# Patient Record
Sex: MALE | Race: WHITE | Employment: OTHER | URBAN - METROPOLITAN AREA
[De-identification: names, ages, dates, MRNs, and addresses within clinical notes are randomized per-mention and may not be internally consistent; named-entity substitution may affect disease eponyms.]

---

## 2016-03-28 LAB
COLONOSCOPY, EXTERNAL: NORMAL
COLONOSCOPY, EXTERNAL: NORMAL

## 2017-01-05 ENCOUNTER — TELEPHONE (OUTPATIENT)
Dept: FAMILY MEDICINE CLINIC | Age: 72
End: 2017-01-05

## 2017-01-05 NOTE — TELEPHONE ENCOUNTER
TP #4 NC/B  Spoke w/ pt as part of [address] w/ pts wife, Taty Alvarez (on HIPPA). Pt is compliant with his medications however is no longer checking his bs. Pt has memory issues and is not following a dm diet (per wife,he sneaks food). Pt seen in the ED 12/24 for lethargy and was dx with flu--pt is better but still recovering. Will cont to check in with pt and provide support for the family. If not at goal next ov will add metformin.

## 2017-03-02 DIAGNOSIS — J44.9 CHRONIC OBSTRUCTIVE PULMONARY DISEASE, UNSPECIFIED COPD TYPE (HCC): Chronic | ICD-10-CM

## 2017-03-02 RX ORDER — BUDESONIDE AND FORMOTEROL FUMARATE DIHYDRATE 160; 4.5 UG/1; UG/1
2 AEROSOL RESPIRATORY (INHALATION) 2 TIMES DAILY
Qty: 1 INHALER | Refills: 5 | Status: SHIPPED | OUTPATIENT
Start: 2017-03-02 | End: 2017-07-05 | Stop reason: SDUPTHER

## 2017-03-06 ENCOUNTER — OFFICE VISIT (OUTPATIENT)
Dept: FAMILY MEDICINE CLINIC | Age: 72
End: 2017-03-06

## 2017-03-06 VITALS
BODY MASS INDEX: 30.75 KG/M2 | DIASTOLIC BLOOD PRESSURE: 85 MMHG | HEART RATE: 84 BPM | RESPIRATION RATE: 20 BRPM | OXYGEN SATURATION: 96 % | WEIGHT: 232 LBS | SYSTOLIC BLOOD PRESSURE: 113 MMHG | HEIGHT: 73 IN

## 2017-03-06 DIAGNOSIS — I10 ESSENTIAL HYPERTENSION WITH GOAL BLOOD PRESSURE LESS THAN 140/90: Chronic | ICD-10-CM

## 2017-03-06 DIAGNOSIS — D50.0 IRON DEFICIENCY ANEMIA DUE TO CHRONIC BLOOD LOSS: Chronic | ICD-10-CM

## 2017-03-06 DIAGNOSIS — E11.65 TYPE 2 DIABETES MELLITUS WITH HYPERGLYCEMIA, WITHOUT LONG-TERM CURRENT USE OF INSULIN (HCC): Primary | Chronic | ICD-10-CM

## 2017-03-06 DIAGNOSIS — M19.90 OSTEOARTHRITIS, UNSPECIFIED OSTEOARTHRITIS TYPE, UNSPECIFIED SITE: ICD-10-CM

## 2017-03-06 RX ORDER — GLIPIZIDE 5 MG/1
TABLET ORAL
Qty: 60 TAB | Refills: 4 | Status: SHIPPED | OUTPATIENT
Start: 2017-03-06 | End: 2017-07-05 | Stop reason: SDUPTHER

## 2017-03-06 RX ORDER — LOSARTAN POTASSIUM AND HYDROCHLOROTHIAZIDE 25; 100 MG/1; MG/1
1 TABLET ORAL DAILY
Qty: 30 TAB | Refills: 5 | Status: SHIPPED | OUTPATIENT
Start: 2017-03-06 | End: 2017-05-11 | Stop reason: SDUPTHER

## 2017-03-06 NOTE — PROGRESS NOTES
Patient here for dm f/u, fasting labs. 1. Have you been to the ER, urgent care clinic since your last visit? Hospitalized since your last visit? No    2. Have you seen or consulted any other health care providers outside of the [address] [place] [address] since your last visit? Include any pap smears or colon screening. Francesca Yoo  3/6/2017  Provider:   Krysten:  Diabetes Report Card   1) Have you seen the eye doctor in past year?no    2) How would you  rate your Diabetic Diet?good   3) How well do you take care of your feet?well   4) Do you keep your Primary Care Follow Up Appts? yes    5) Do you know your A1C goal?yes    6) Do you take your medications daily? yes    7) Do you check your blood sugars?no    8) Have you gained weight?no      9) Do you follow an exercise program?yes    10) Can you do better?yes      Lab Results   Component Value Date/Time    Cholesterol, total 207 12/05/2016 08:03 AM    HDL Cholesterol 35 12/05/2016 08:03 AM    LDL, calculated 143 12/05/2016 08:03 AM    Triglyceride 145 12/05/2016 08:03 AM     Lab Results   Component Value Date/Time    Hemoglobin A1c 7.7 12/05/2016 08:03 AM    Hemoglobin A1c 6.7 08/31/2016 07:54 AM    Hemoglobin A1c 6.2 04/06/2016 11:59 AM    Glucose 256 12/24/2016 11:40 AM    Glucose (POC) 202 03/30/2016 07:31 AM    Microalb/Creat ratio (ug/mg creat.) 12.2 08/31/2016 07:54 AM    LDL, calculated 143 12/05/2016 08:03 AM    Creatinine (POC) 0.9 08/02/2013 11:38 AM    Creatinine 1.14 12/24/2016 11:40 AM        Lab Results   Component Value Date/Time    Microalb/Creat ratio (ug/mg creat.) 12.2 08/31/2016 07:54 AM      Chief Complaint   Patient presents with    Diabetes     he is a 70y.o. year old male who presents for evaluation. See Diabetic Report Card listed above.      Patient Active Problem List    Diagnosis    Osteoarthritis    Advance care planning    Dementia without behavioral disturbance    PUD (peptic ulcer disease)    Iron deficiency anemia    Candidal esophagitis (HCC)    COPD (chronic obstructive pulmonary disease) (HCC)    HTN (hypertension)    Type II diabetes mellitus ([facility] [address]    Blood loss anemia    GI bleed       Nurses notes were copied in to this note. Reviewed PmHx, RxHx, FmHx, SocHx, AllgHx--dated and updated in the chart. Review of Systems - negative except as listed above in the HPI    Objective:     Vitals:    03/06/17 0719   BP: 113/85   Pulse: 84   Resp: 20   SpO2: 96%   Weight: 232 lb (105.2 kg)   Height: 6' 1\" (1.854 m)     Physical Examination: General appearance - alert, well appearing, and in no distress  Chest - clear to auscultation, no wheezes, rales or rhonchi, symmetric air entry  Heart - normal rate, regular rhythm, normal S1, S2, no murmurs, rubs, clicks or gallops  Abdomen - soft, nontender, nondistended, no masses or organomegaly  Extremities - peripheral pulses normal, no pedal edema, no clubbing or cyanosis    Assessment/ Plan:   Trudi Enamorado was seen today for diabetes. Diagnoses and all orders for this visit:    Type 2 diabetes mellitus with hyperglycemia, without long-term current use of insulin ([facility])  -     LIPID PANEL  -     METABOLIC PANEL, COMPREHENSIVE  -     HEMOGLOBIN A1C WITH EAG  -I discussed with the patient the new \"Diabetes Full [place]\" outreach program.  Patient has agreed to participate and understands expectations and goals  Our brochure, along with the listed current labs and standards of care, was given to the patient. The patient also met with Armani Sue (SHANKAR), who will be contacting the patient outside of their appointments. -pt has made some diet changes  -inc A1C last Ov--will add rx if still inc---add metformin    Essential hypertension with goal blood pressure less than 140/90  -     losartan-hydroCHLOROthiazide (HYZAAR) 100-25 mg per tablet; Take 1 Tab by mouth daily.  Indications: hypertension  -     LIPID PANEL  -     METABOLIC PANEL, COMPREHENSIVE  -at goal    Iron deficiency anemia due to chronic blood loss  -     CBC WITH AUTOMATED DIFF    Other orders  -     glipiZIDE (GLUCOTROL) 5 mg tablet; TAKE ONE TABLET BY MOUTH TWICE A DAY       Follow-up Disposition:  Return in about 3 months (around 6/6/2017) for DM. Lab Results   Component Value Date/Time    Cholesterol, total 207 12/05/2016 08:03 AM    HDL Cholesterol 35 12/05/2016 08:03 AM    LDL, calculated 143 12/05/2016 08:03 AM    Triglyceride 145 12/05/2016 08:03 AM     Lab Results   Component Value Date/Time    Hemoglobin A1c 7.7 12/05/2016 08:03 AM    Hemoglobin A1c 6.7 08/31/2016 07:54 AM    Hemoglobin A1c 6.2 04/06/2016 11:59 AM    Microalb/Creat ratio (ug/mg creat.) 12.2 08/31/2016 07:54 AM    LDL, calculated 143 12/05/2016 08:03 AM    Creatinine (POC) 0.9 08/02/2013 11:38 AM    Creatinine 1.14 12/24/2016 11:40 AM          Discussed with patient goal of Diabetes to include:  HgA1C <7, LDL cholesterol <70, Blood pressure <130/80. Discussed with patient diet and weight management and to get regular exercise. Recommend yearly eye exams and daily foot care. The patient understands and agrees with the plan. I have discussed the diagnosis with the patient and the intended plan as seen in the above orders. The patient has received an after-visit summary and questions were answered concerning future plans. Medication Side Effects and Warnings were discussed with patient  Patient Labs were reviewed and or requested  Patient Past Records were reviewed and or requested    Susan De La Torre M.D. [facility]    There are no Patient Instructions on file for this visit.

## 2017-03-06 NOTE — MR AVS SNAPSHOT
Visit Information Date & Time Provider Department Dept. Phone Encounter #  
 3/6/2017  7:15 AM Erika Beverly MD [address] [facility] [phone] [id number] Follow-up Instructions Return in about 3 months (around 6/6/2017) for DM. Upcoming Health Maintenance Date Due  
 EYE EXAM RETINAL OR DILATED Q1 11/6/1955 DTaP/Tdap/Td series (1 - Tdap) 11/6/1966 ZOSTER VACCINE AGE 60> 11/6/2005 GLAUCOMA SCREENING Q2Y 11/6/2010 Pneumococcal 65+ Low/Medium Risk (1 of 2 - PCV13) 11/6/2010 FOBT Q 1 YEAR AGE 50-75 3/27/2017 HEMOGLOBIN A1C Q6M 6/5/2017 FOOT EXAM Q1 8/31/2017 MICROALBUMIN Q1 8/31/2017 LIPID PANEL Q1 12/5/2017 MEDICARE YEARLY EXAM 12/6/2017 Allergies as of 3/6/2017  Review Complete On: 3/6/2017 By: Erika Beverly MD  
  
 Severity Noted Reaction Type Reactions Pcn [Penicillins] Medium 12/12/2012   Systemic Rash  
 Sulfa (Sulfonamide Antibiotics) Medium 12/12/2012   Systemic Rash Demerol [Meperidine]  12/12/2012   Intolerance Nausea and Vomiting Severe vomiting Current Immunizations  Reviewed on 12/5/2016 Name Date Influenza High Dose Vaccine PF 12/5/2016 Not reviewed this visit You Were Diagnosed With   
  
 Codes Comments Type 2 diabetes mellitus with hyperglycemia, without long-term current use of insulin (HCC)    -  Primary ICD-10-CM: E11.65 ICD-9-CM: 250.00, 790.29 Essential hypertension with goal blood pressure less than 140/90     ICD-10-CM: I10 
ICD-9-CM: 401.9 Iron deficiency anemia due to chronic blood loss     ICD-10-CM: D50.0 ICD-9-CM: 280.0 Vitals BP Pulse Resp Height(growth percentile) Weight(growth percentile) SpO2  
 113/85 84 20 6' 1\" (1.854 m) 232 lb (105.2 kg) 96% BMI Smoking Status 30.61 kg/m2 Former Smoker Vitals History BMI and BSA Data Body Mass Index Body Surface Area  
 30.61 kg/m 2 2.33 m 2 Preferred Pharmacy Pharmacy Name Phone [facility] [facility], [address] [phone] Your Updated Medication List  
  
   
This list is accurate as of: 3/6/17  7:49 AM.  Always use your most recent med list.  
  
  
  
  
 albuterol 90 mcg/actuation inhaler Commonly known as:  PROAIR HFA Take 1 Puff by inhalation every four (4) hours as needed. budesonide-formoterol 160-4.5 mcg/actuation HFA inhaler Commonly known as:  SYMBICORT Take 2 Puffs by inhalation two (2) times a day. ferrous sulfate 325 mg (65 mg iron) tablet TAKE ONE TABLET BY MOUTH THREE TIMES A DAY WITH MEALS  
  
 glipiZIDE 5 mg tablet Commonly known as:  GLUCOTROL  
TAKE ONE TABLET BY MOUTH TWICE A DAY  
  
 guaiFENesin  mg ER tablet Commonly known as:  [address] Take 1 Tab by mouth two (2) times a day. losartan-hydroCHLOROthiazide 100-25 mg per tablet Commonly known as:  HYZAAR Take 1 Tab by mouth daily. Indications: hypertension OTHER Take 1 Tab by mouth daily. Previgen supplement for memory  
  
 pantoprazole 40 mg tablet Commonly known as:  PROTONIX  
TAKE ONE TABLET BY MOUTH TWICE A DAY  
  
 simvastatin 10 mg tablet Commonly known as:  ZOCOR Take 10 mg by mouth nightly. Prescriptions Sent to Pharmacy Refills  
 losartan-hydroCHLOROthiazide (HYZAAR) 100-25 mg per tablet 5 Sig: Take 1 Tab by mouth daily. Indications: hypertension Class: Normal  
 Pharmacy: [facility], [address] Ph #: [phone] Route: Oral  
 glipiZIDE (GLUCOTROL) 5 mg tablet 4 Sig: TAKE ONE TABLET BY MOUTH TWICE A DAY Class: Normal  
 Pharmacy: [facility], [address] Ph #: [phone] We Performed the Following CBC WITH AUTOMATED DIFF [08367 CPT(R)] HEMOGLOBIN A1C WITH EAG [80899 CPT(R)] LIPID PANEL [62459 CPT(R)] METABOLIC PANEL, COMPREHENSIVE [08112 CPT(R)] Follow-up Instructions Return in about 3 months (around 6/6/2017) for DM. Introducing [facility]! Jennifer Ramos introduces [employer] patient portal. Now you can access parts of your medical record, email your doctor's office, and request medication refills online. 1. In your internet browser, go to [email or web address] 2. Click on the First Time User? Click Here link in the Sign In box. You will see the New Member Sign Up page. 3. Enter your [employer] Access Code exactly as it appears below. You will not need to use this code after youve completed the sign-up process. If you do not sign up before the expiration date, you must request a new code. · [employer] Access Code: [email or web address] Expires: 6/4/2017  7:49 AM 
 
4. Enter the last four digits of your Social Security Number (xxxx) and Date of Birth (mm/dd/yyyy) as indicated and click Submit. You will be taken to the next sign-up page. 5. Create a D[employer]uda ID. This will be your [employer] login ID and cannot be changed, so think of one that is secure and easy to remember. 6. Create a [employer]uda password. You can change your password at any time. 7. Enter your Password Reset Question and Answer. This can be used at a later time if you forget your password. 8. Enter your e-mail address. You will receive e-mail notification when new information is available in [address] 9. Click Sign Up. You can now view and download portions of your medical record. 10. Click the Download Summary menu link to download a portable copy of your medical information. If you have questions, please visit the Frequently Asked Questions section of the [employer] website. Remember, [employer] is NOT to be used for urgent needs. For medical emergencies, dial 911. Now available from your iPhone and Android! Please provide this summary of care documentation to your next provider. Your primary care clinician is listed as MERCEDEZ TEJADA. If you have any questions after today's visit, please call [phone].

## 2017-03-07 LAB
ALBUMIN SERPL-MCNC: 4.4 G/DL (ref 3.5–4.8)
ALBUMIN/GLOB SERPL: 1.7 {RATIO} (ref 1.1–2.5)
ALP SERPL-CCNC: 61 IU/L (ref 39–117)
ALT SERPL-CCNC: 10 IU/L (ref 0–44)
AST SERPL-CCNC: 12 IU/L (ref 0–40)
BASOPHILS # BLD AUTO: 0 X10E3/UL (ref 0–0.2)
BASOPHILS NFR BLD AUTO: 0 %
BILIRUB SERPL-MCNC: 1.3 MG/DL (ref 0–1.2)
BUN SERPL-MCNC: 15 MG/DL (ref 8–27)
BUN/CREAT SERPL: 12 (ref 10–22)
CALCIUM SERPL-MCNC: 9.2 MG/DL (ref 8.6–10.2)
CHLORIDE SERPL-SCNC: 99 MMOL/L (ref 96–106)
CHOLEST SERPL-MCNC: 189 MG/DL (ref 100–199)
CO2 SERPL-SCNC: 25 MMOL/L (ref 18–29)
CREAT SERPL-MCNC: 1.25 MG/DL (ref 0.76–1.27)
EOSINOPHIL # BLD AUTO: 0 X10E3/UL (ref 0–0.4)
EOSINOPHIL NFR BLD AUTO: 1 %
ERYTHROCYTE [DISTWIDTH] IN BLOOD BY AUTOMATED COUNT: 13.6 % (ref 12.3–15.4)
EST. AVERAGE GLUCOSE BLD GHB EST-MCNC: 140 MG/DL
GLOBULIN SER CALC-MCNC: 2.6 G/DL (ref 1.5–4.5)
GLUCOSE SERPL-MCNC: 105 MG/DL (ref 65–99)
HBA1C MFR BLD: 6.5 % (ref 4.8–5.6)
HCT VFR BLD AUTO: 41 % (ref 37.5–51)
HDLC SERPL-MCNC: 43 MG/DL
HGB BLD-MCNC: 14.3 G/DL (ref 12.6–17.7)
IMM GRANULOCYTES # BLD: 0 X10E3/UL (ref 0–0.1)
IMM GRANULOCYTES NFR BLD: 0 %
INTERPRETATION, 910389: NORMAL
INTERPRETATION: NORMAL
LDLC SERPL CALC-MCNC: 128 MG/DL (ref 0–99)
LYMPHOCYTES # BLD AUTO: 0.9 X10E3/UL (ref 0.7–3.1)
LYMPHOCYTES NFR BLD AUTO: 17 %
Lab: NORMAL
MCH RBC QN AUTO: 31.8 PG (ref 26.6–33)
MCHC RBC AUTO-ENTMCNC: 34.9 G/DL (ref 31.5–35.7)
MCV RBC AUTO: 91 FL (ref 79–97)
MONOCYTES # BLD AUTO: 0.6 X10E3/UL (ref 0.1–0.9)
MONOCYTES NFR BLD AUTO: 10 %
NEUTROPHILS # BLD AUTO: 4.1 X10E3/UL (ref 1.4–7)
NEUTROPHILS NFR BLD AUTO: 72 %
PDF IMAGE, 910387: NORMAL
PLATELET # BLD AUTO: 238 X10E3/UL (ref 150–379)
POTASSIUM SERPL-SCNC: 3.7 MMOL/L (ref 3.5–5.2)
PROT SERPL-MCNC: 7 G/DL (ref 6–8.5)
RBC # BLD AUTO: 4.49 X10E6/UL (ref 4.14–5.8)
SODIUM SERPL-SCNC: 141 MMOL/L (ref 134–144)
TRIGL SERPL-MCNC: 91 MG/DL (ref 0–149)
VLDLC SERPL CALC-MCNC: 18 MG/DL (ref 5–40)
WBC # BLD AUTO: 5.6 X10E3/UL (ref 3.4–10.8)

## 2017-05-11 DIAGNOSIS — I10 ESSENTIAL HYPERTENSION WITH GOAL BLOOD PRESSURE LESS THAN 140/90: Chronic | ICD-10-CM

## 2017-05-15 RX ORDER — LOSARTAN POTASSIUM AND HYDROCHLOROTHIAZIDE 25; 100 MG/1; MG/1
1 TABLET ORAL DAILY
Qty: 30 TAB | Refills: 5 | Status: SHIPPED | OUTPATIENT
Start: 2017-05-15 | End: 2017-06-15 | Stop reason: SDUPTHER

## 2017-05-24 ENCOUNTER — TELEPHONE (OUTPATIENT)
Dept: FAMILY MEDICINE CLINIC | Age: 72
End: 2017-05-24

## 2017-05-24 NOTE — TELEPHONE ENCOUNTER
Left voicemail. Called to discuss dm management.  Pt enrolled in [facility] Outreach program.

## 2017-05-25 ENCOUNTER — DOCUMENTATION ONLY (OUTPATIENT)
Dept: FAMILY MEDICINE CLINIC | Age: 72
End: 2017-05-25

## 2017-06-15 DIAGNOSIS — I10 ESSENTIAL HYPERTENSION WITH GOAL BLOOD PRESSURE LESS THAN 140/90: Chronic | ICD-10-CM

## 2017-06-15 RX ORDER — LOSARTAN POTASSIUM AND HYDROCHLOROTHIAZIDE 25; 100 MG/1; MG/1
1 TABLET ORAL DAILY
Qty: 90 TAB | Refills: 1 | Status: SHIPPED | OUTPATIENT
Start: 2017-06-15 | End: 2018-02-26 | Stop reason: SDUPTHER

## 2017-06-29 ENCOUNTER — TELEPHONE (OUTPATIENT)
Dept: FAMILY MEDICINE CLINIC | Age: 72
End: 2017-06-29

## 2017-06-29 NOTE — TELEPHONE ENCOUNTER
TP #5 C-A Spoke to pts wife, Eri Knight (on HIPPA). Reminded to have pt set up for appointment--pt is overdue for labs. Wife feels that pt is malnourished b/c he is not eating much of anything. Will need to assess pts eating habits at next ov and obtain labs. Pt is compliant with medications. Hx of dementia.

## 2017-07-05 ENCOUNTER — HOSPITAL ENCOUNTER (OUTPATIENT)
Dept: LAB | Age: 72
Discharge: HOME | End: 2017-07-05
Payer: MEDICARE

## 2017-07-05 ENCOUNTER — OFFICE VISIT (OUTPATIENT)
Dept: FAMILY MEDICINE CLINIC | Age: 72
End: 2017-07-05

## 2017-07-05 VITALS
RESPIRATION RATE: 20 BRPM | HEART RATE: 74 BPM | HEIGHT: 73 IN | DIASTOLIC BLOOD PRESSURE: 75 MMHG | OXYGEN SATURATION: 94 % | TEMPERATURE: 98 F | BODY MASS INDEX: 30.75 KG/M2 | WEIGHT: 232 LBS | SYSTOLIC BLOOD PRESSURE: 104 MMHG

## 2017-07-05 DIAGNOSIS — J44.9 CHRONIC OBSTRUCTIVE PULMONARY DISEASE, UNSPECIFIED COPD TYPE (HCC): Chronic | ICD-10-CM

## 2017-07-05 DIAGNOSIS — E11.65 TYPE 2 DIABETES MELLITUS WITH HYPERGLYCEMIA, WITHOUT LONG-TERM CURRENT USE OF INSULIN (HCC): Primary | Chronic | ICD-10-CM

## 2017-07-05 DIAGNOSIS — I10 ESSENTIAL HYPERTENSION: Chronic | ICD-10-CM

## 2017-07-05 DIAGNOSIS — B37.81 CANDIDAL ESOPHAGITIS (HCC): ICD-10-CM

## 2017-07-05 PROCEDURE — 80061 LIPID PANEL: CPT

## 2017-07-05 PROCEDURE — 80053 COMPREHEN METABOLIC PANEL: CPT

## 2017-07-05 PROCEDURE — 83036 HEMOGLOBIN GLYCOSYLATED A1C: CPT

## 2017-07-05 RX ORDER — BUDESONIDE AND FORMOTEROL FUMARATE DIHYDRATE 160; 4.5 UG/1; UG/1
2 AEROSOL RESPIRATORY (INHALATION) 2 TIMES DAILY
Qty: 1 INHALER | Refills: 5 | Status: SHIPPED | OUTPATIENT
Start: 2017-07-05 | End: 2018-03-01 | Stop reason: SDUPTHER

## 2017-07-05 RX ORDER — GLIPIZIDE 5 MG/1
TABLET ORAL
Qty: 180 TAB | Refills: 3 | Status: SHIPPED | OUTPATIENT
Start: 2017-07-05 | End: 2018-06-19 | Stop reason: SDUPTHER

## 2017-07-05 NOTE — PROGRESS NOTES
Patient here for 3 month f/u DM, fasting labs. 1. Have you been to the ER, urgent care clinic since your last visit? Hospitalized since your last visit? No    2. Have you seen or consulted any other health care providers outside of the [address] [place] [address] since your last visit? Include any pap smears or colon screening. No       Darron Arias  7/5/2017  Provider:   Krysten:  Diabetes Report Card   1) Have you seen the eye doctor in past year?yes    2) How would you  rate your Diabetic Diet?good   3) How well do you take care of your feet?well   4) Do you keep your Primary Care Follow Up Appts? yes    5) Do you know your A1C goal?yes    6) Do you take your medications daily? yes    7) Do you check your blood sugars? yes    8) Have you gained weight?no       9) Do you follow an exercise program?yes    10) Can you do better?yes      Lab Results   Component Value Date/Time    Cholesterol, total 189 03/06/2017 07:49 AM    HDL Cholesterol 43 03/06/2017 07:49 AM    LDL, calculated 128 03/06/2017 07:49 AM    Triglyceride 91 03/06/2017 07:49 AM     Lab Results   Component Value Date/Time    Hemoglobin A1c 6.5 03/06/2017 07:49 AM    Hemoglobin A1c 7.7 12/05/2016 08:03 AM    Hemoglobin A1c 6.7 08/31/2016 07:54 AM    Glucose 105 03/06/2017 07:49 AM    Glucose (POC) 202 03/30/2016 07:31 AM    Microalb/Creat ratio (ug/mg creat.) 12.2 08/31/2016 07:54 AM    LDL, calculated 128 03/06/2017 07:49 AM    Creatinine (POC) 0.9 08/02/2013 11:38 AM    Creatinine 1.25 03/06/2017 07:49 AM        Lab Results   Component Value Date/Time    Microalb/Creat ratio (ug/mg creat.) 12.2 08/31/2016 07:54 AM      Chief Complaint   Patient presents with    Diabetes     fasting labs     he is a 70y.o. year old male who presents for evaluation. See Diabetic Report Card listed above.      Patient Active Problem List    Diagnosis    Type 2 diabetes mellitus with hyperglycemia, without long-term current use of insulin ([facility] [address]    Osteoarthritis    Advance care planning    Dementia without behavioral disturbance    PUD (peptic ulcer disease)    Iron deficiency anemia    Candidal esophagitis (HCC)    COPD (chronic obstructive pulmonary disease) (HCC)    HTN (hypertension)    Blood loss anemia    GI bleed       Nurses notes were copied in to this note. Reviewed PmHx, RxHx, FmHx, SocHx, AllgHx--dated and updated in the chart. Review of Systems - negative except as listed above in the HPI    Objective:     Vitals:    07/05/17 0830   BP: 104/75   Pulse: 74   Resp: 20   Temp: 98 °F (36.7 °C)   SpO2: 94%   Weight: 232 lb (105.2 kg)   Height: 6' 1\" (1.854 m)     Physical Examination: General appearance - alert, well appearing, and in no distress  Neck - supple, no significant adenopathy  Chest - clear to auscultation, no wheezes, rales or rhonchi, symmetric air entry  Heart - normal rate, regular rhythm, normal S1, S2, no murmurs, rubs, clicks or gallops    Assessment/ Plan:   Holly Jiménez was seen today for diabetes. Diagnoses and all orders for this visit:    Type 2 diabetes mellitus with hyperglycemia, without long-term current use of insulin ([facility])  -     glipiZIDE (GLUCOTROL) 5 mg tablet; TAKE ONE TABLET BY MOUTH TWICE A DAY  -     LIPID PANEL  -     METABOLIC PANEL, COMPREHENSIVE  -     HEMOGLOBIN A1C WITH EAG  -     REFERRAL TO OPHTHALMOLOGY  -at goal  -I discussed with the patient the new \"Diabetes Full [place]\" outreach program.  Patient has agreed to participate and understands expectations and goals  Our brochure, along with the listed current labs and standards of care, was given to the patient. Essential hypertension  -     LIPID PANEL  -     METABOLIC PANEL, COMPREHENSIVE  -at goal    Chronic obstructive pulmonary disease, unspecified COPD type (HCC)  -     budesonide-formoterol (SYMBICORT) 160-4.5 mcg/actuation HFA inhaler; Take 2 Puffs by inhalation two (2) times a day.     Candidal esophagitis ([address]  -resolved Follow-up Disposition:  Return in about 3 months (around 10/5/2017) for DM. Lab Results   Component Value Date/Time    Cholesterol, total 189 03/06/2017 07:49 AM    HDL Cholesterol 43 03/06/2017 07:49 AM    LDL, calculated 128 03/06/2017 07:49 AM    Triglyceride 91 03/06/2017 07:49 AM     Lab Results   Component Value Date/Time    Hemoglobin A1c 6.5 03/06/2017 07:49 AM    Hemoglobin A1c 7.7 12/05/2016 08:03 AM    Hemoglobin A1c 6.7 08/31/2016 07:54 AM    Microalb/Creat ratio (ug/mg creat.) 12.2 08/31/2016 07:54 AM    LDL, calculated 128 03/06/2017 07:49 AM    Creatinine (POC) 0.9 08/02/2013 11:38 AM    Creatinine 1.25 03/06/2017 07:49 AM          Discussed with patient goal of Diabetes to include:  HgA1C <7, LDL cholesterol <70, Blood pressure <130/80. Discussed with patient diet and weight management and to get regular exercise. Recommend yearly eye exams and daily foot care. The patient understands and agrees with the plan. I have discussed the diagnosis with the patient and the intended plan as seen in the above orders. The patient has received an after-visit summary and questions were answered concerning future plans. Medication Side Effects and Warnings were discussed with patient  Patient Labs were reviewed and or requested  Patient Past Records were reviewed and or requested    Shaun Wray M.D. [address] [facility]    There are no Patient Instructions on file for this visit.

## 2017-07-05 NOTE — LETTER
7/6/2017 9:32 AM 
 
Mr. Tory Becerra [address] [address] Dear Tory Becerra: 
 
Please find your most recent results below. Resulted Orders LIPID PANEL Result Value Ref Range Cholesterol, total 197 100 - 199 mg/dL Triglyceride 114 0 - 149 mg/dL HDL Cholesterol 36 (L) >39 mg/dL VLDL, calculated 23 5 - 40 mg/dL LDL, calculated 138 (H) 0 - 99 mg/dL Narrative Performed at:  [address]  [id number] : Inocente Light MD, Phone:  [phone] METABOLIC PANEL, COMPREHENSIVE Result Value Ref Range Glucose 124 (H) 65 - 99 mg/dL BUN 12 8 - 27 mg/dL Creatinine 1.13 0.76 - 1.27 mg/dL GFR est non-AA 65 >59 mL/min/1.73 GFR est AA 75 >59 mL/min/1.73  
 BUN/Creatinine ratio 11 10 - 24 Sodium 140 134 - 144 mmol/L Potassium 3.3 (L) 3.5 - 5.2 mmol/L Chloride 99 96 - 106 mmol/L  
 CO2 25 18 - 29 mmol/L Calcium 9.0 8.6 - 10.2 mg/dL Protein, total 6.7 6.0 - 8.5 g/dL Albumin 4.1 3.5 - 4.8 g/dL GLOBULIN, TOTAL 2.6 1.5 - 4.5 g/dL A-G Ratio 1.6 1.2 - 2.2 Bilirubin, total 1.2 0.0 - 1.2 mg/dL Alk. phosphatase 64 39 - 117 IU/L  
 AST (SGOT) 15 0 - 40 IU/L  
 ALT (SGPT) 10 0 - 44 IU/L Narrative Performed at:  [address]  [id number] : Inocente Light MD, Phone:  [phone] HEMOGLOBIN A1C WITH EAG Result Value Ref Range Hemoglobin A1c 6.8 (H) 4.8 - 5.6 % Comment:  
            Pre-diabetes: 5.7 - 6.4 Diabetes: >6.4 Glycemic control for adults with diabetes: <7.0 Estimated average glucose 148 mg/dL Narrative Performed at:  [address]  [id number] : Inocente Light MD, Phone:  [phone] CVD REPORT Result Value Ref Range INTERPRETATION Note Comment:  
   Supplement report is available. Narrative Performed at:  [facility] [place] [address]  [id number] : Autumn Romero PhD, Phone:  [phone] DIABETES PATIENT EDUCATION Result Value Ref Range PDF Image Not applicable Narrative Performed at:  [address] [place] [address]  [id number] : Autumn Romero PhD, Phone:  [phone] Please call me if you have any questions: [phone] Sincerely, TOMI PintoC

## 2017-07-05 NOTE — MR AVS SNAPSHOT
Visit Information Date & Time Provider Department Dept. Phone Encounter #  
 7/5/2017  8:30 AM Steven Saucedo MD [address] [facility] [phone] [id number] Follow-up Instructions Return in about 3 months (around 10/5/2017) for DM. Upcoming Health Maintenance Date Due  
 EYE EXAM RETINAL OR DILATED Q1 11/6/1955 DTaP/Tdap/Td series (1 - Tdap) 11/6/1966 ZOSTER VACCINE AGE 60> 11/6/2005 GLAUCOMA SCREENING Q2Y 11/6/2010 Pneumococcal 65+ Low/Medium Risk (1 of 2 - PCV13) 11/6/2010 FOBT Q 1 YEAR AGE 50-75 3/27/2017 INFLUENZA AGE 9 TO ADULT 8/1/2017 FOOT EXAM Q1 8/31/2017 MICROALBUMIN Q1 8/31/2017 HEMOGLOBIN A1C Q6M 9/6/2017 MEDICARE YEARLY EXAM 12/6/2017 LIPID PANEL Q1 3/6/2018 Allergies as of 7/5/2017  Review Complete On: 7/5/2017 By: Steven Saucedo MD  
  
 Severity Noted Reaction Type Reactions Pcn [Penicillins] Medium 12/12/2012   Systemic Rash  
 Sulfa (Sulfonamide Antibiotics) Medium 12/12/2012   Systemic Rash Demerol [Meperidine]  12/12/2012   Intolerance Nausea and Vomiting Severe vomiting Current Immunizations  Reviewed on 12/5/2016 Name Date Influenza High Dose Vaccine PF 12/5/2016 Not reviewed this visit You Were Diagnosed With   
  
 Codes Comments Type 2 diabetes mellitus with hyperglycemia, without long-term current use of insulin (HCC)    -  Primary ICD-10-CM: E11.65 ICD-9-CM: 250.00, 790.29 Essential hypertension     ICD-10-CM: I10 
ICD-9-CM: 401.9 Chronic obstructive pulmonary disease, unspecified COPD type ([facility]     ICD-10-CM: J44.9 ICD-9-CM: 788 Candidal esophagitis (HCC)     ICD-10-CM: B37.81 ICD-9-CM: 112.84 Vitals BP Pulse Temp Resp Height(growth percentile) Weight(growth percentile) 104/75 74 98 °F (36.7 °C) 20 6' 1\" (1.854 m) 232 lb (105.2 kg) SpO2 BMI Smoking Status 94% 30.61 kg/m2 Former Smoker Vitals History BMI and BSA Data Body Mass Index Body Surface Area  
 30.61 kg/m 2 2.33 m 2 Preferred Pharmacy Pharmacy Name Phone Annemarie Ray [facility], [address] [phone] Your Updated Medication List  
  
   
This list is accurate as of: 7/5/17  9:06 AM.  Always use your most recent med list.  
  
  
  
  
 albuterol 90 mcg/actuation inhaler Commonly known as:  PROAIR HFA Take 1 Puff by inhalation every four (4) hours as needed. budesonide-formoterol 160-4.5 mcg/actuation HFA inhaler Commonly known as:  SYMBICORT Take 2 Puffs by inhalation two (2) times a day. ferrous sulfate 325 mg (65 mg iron) tablet TAKE ONE TABLET BY MOUTH THREE TIMES A DAY WITH MEALS  
  
 glipiZIDE 5 mg tablet Commonly known as:  GLUCOTROL  
TAKE ONE TABLET BY MOUTH TWICE A DAY  
  
 losartan-hydroCHLOROthiazide 100-25 mg per tablet Commonly known as:  HYZAAR Take 1 Tab by mouth daily. Indications: hypertension  
  
 pantoprazole 40 mg tablet Commonly known as:  PROTONIX  
TAKE ONE TABLET BY MOUTH TWICE A DAY  
  
 simvastatin 10 mg tablet Commonly known as:  ZOCOR Take 10 mg by mouth nightly. Prescriptions Sent to Pharmacy Refills  
 budesonide-formoterol (SYMBICORT) 160-4.5 mcg/actuation HFA inhaler 5 Sig: Take 2 Puffs by inhalation two (2) times a day. Class: Normal  
 Pharmacy: Annemarie Larsen, [email or web address] Ph #: [phone] Route: Inhalation  
 glipiZIDE (GLUCOTROL) 5 mg tablet 3 Sig: TAKE ONE TABLET BY MOUTH TWICE A DAY Class: Normal  
 Pharmacy: Annemarie Larsen, [address] [address] Ph #: [phone] We Performed the Following HEMOGLOBIN A1C WITH EAG [70120 CPT(R)] LIPID PANEL [21545 CPT(R)] METABOLIC PANEL, COMPREHENSIVE [38049 CPT(R)] REFERRAL TO OPHTHALMOLOGY [REF57 Custom] Follow-up Instructions Return in about 3 months (around 10/5/2017) for DM. Referral Information Referral ID Referred By Referred To  
  
 [id number] MERCEDEZ TEJADA Not Available Visits Status Start Date End Date 1 New Request 7/5/17 7/5/18 If your referral has a status of pending review or denied, additional information will be sent to support the outcome of this decision. Introducing [facility] & [facility] SERVICES! Eron Torres introduces HomeRun patient portal. Now you can access parts of your medical record, email your doctor's office, and request medication refills online. 1. In your internet browser, go to [email or web address] 2. Click on the First Time User? Click Here link in the Sign In box. You will see the New Member Sign Up page. 3. Enter your HomeRun Access Code exactly as it appears below. You will not need to use this code after youve completed the sign-up process. If you do not sign up before the expiration date, you must request a new code. · HomeRun Access Code: [email or web address] Expires: 10/3/2017  8:17 AM 
 
4. Enter the last four digits of your Social Security Number (xxxx) and Date of Birth (mm/dd/yyyy) as indicated and click Submit. You will be taken to the next sign-up page. 5. Create a HomeRun ID. This will be your HomeRun login ID and cannot be changed, so think of one that is secure and easy to remember. 6. Create a HomeRun password. You can change your password at any time. 7. Enter your Password Reset Question and Answer. This can be used at a later time if you forget your password. 8. Enter your e-mail address. You will receive e-mail notification when new information is available in [address] 9. Click Sign Up. You can now view and download portions of your medical record. 10. Click the Download Summary menu link to download a portable copy of your medical information. If you have questions, please visit the Frequently Asked Questions section of the HomeRun website.  Remember, HomeRun is NOT to be used for urgent needs. For medical emergencies, dial 911. Now available from your iPhone and Android! Please provide this summary of care documentation to your next provider. Your primary care clinician is listed as MERCEDEZ TEJADA. If you have any questions after today's visit, please call [phone].

## 2017-07-06 LAB
ALBUMIN SERPL-MCNC: 4.1 G/DL (ref 3.5–4.8)
ALBUMIN/GLOB SERPL: 1.6 {RATIO} (ref 1.2–2.2)
ALP SERPL-CCNC: 64 IU/L (ref 39–117)
ALT SERPL-CCNC: 10 IU/L (ref 0–44)
AST SERPL-CCNC: 15 IU/L (ref 0–40)
BILIRUB SERPL-MCNC: 1.2 MG/DL (ref 0–1.2)
BUN SERPL-MCNC: 12 MG/DL (ref 8–27)
BUN/CREAT SERPL: 11 (ref 10–24)
CALCIUM SERPL-MCNC: 9 MG/DL (ref 8.6–10.2)
CHLORIDE SERPL-SCNC: 99 MMOL/L (ref 96–106)
CHOLEST SERPL-MCNC: 197 MG/DL (ref 100–199)
CO2 SERPL-SCNC: 25 MMOL/L (ref 18–29)
CREAT SERPL-MCNC: 1.13 MG/DL (ref 0.76–1.27)
EST. AVERAGE GLUCOSE BLD GHB EST-MCNC: 148 MG/DL
GLOBULIN SER CALC-MCNC: 2.6 G/DL (ref 1.5–4.5)
GLUCOSE SERPL-MCNC: 124 MG/DL (ref 65–99)
HBA1C MFR BLD: 6.8 % (ref 4.8–5.6)
HDLC SERPL-MCNC: 36 MG/DL
INTERPRETATION, 910389: NORMAL
LDLC SERPL CALC-MCNC: 138 MG/DL (ref 0–99)
Lab: NORMAL
POTASSIUM SERPL-SCNC: 3.3 MMOL/L (ref 3.5–5.2)
PROT SERPL-MCNC: 6.7 G/DL (ref 6–8.5)
SODIUM SERPL-SCNC: 140 MMOL/L (ref 134–144)
TRIGL SERPL-MCNC: 114 MG/DL (ref 0–149)
VLDLC SERPL CALC-MCNC: 23 MG/DL (ref 5–40)

## 2017-07-06 RX ORDER — SIMVASTATIN 20 MG/1
20 TABLET, FILM COATED ORAL
Qty: 30 TAB | Refills: 5 | Status: SHIPPED | OUTPATIENT
Start: 2017-07-06 | End: 2018-06-07 | Stop reason: SDUPTHER

## 2017-07-06 NOTE — PROGRESS NOTES
Spoke with pts wife, Olivia Gandhi, (on HIPPA) to review lab results. Pts wife verbalizes understanding to increase Zocor to 20 mg to help with LDL cholesterol. A1c at goal. No med changes at this time. Letter with lab results sent to address on file.

## 2017-08-04 ENCOUNTER — OFFICE VISIT (OUTPATIENT)
Dept: FAMILY MEDICINE CLINIC | Age: 72
End: 2017-08-04

## 2017-08-04 VITALS
DIASTOLIC BLOOD PRESSURE: 74 MMHG | WEIGHT: 235.13 LBS | SYSTOLIC BLOOD PRESSURE: 114 MMHG | HEART RATE: 67 BPM | BODY MASS INDEX: 31.16 KG/M2 | TEMPERATURE: 98.3 F | RESPIRATION RATE: 16 BRPM | HEIGHT: 73 IN | OXYGEN SATURATION: 96 %

## 2017-08-04 DIAGNOSIS — M19.90 OSTEOARTHRITIS, UNSPECIFIED OSTEOARTHRITIS TYPE, UNSPECIFIED SITE: Primary | ICD-10-CM

## 2017-08-04 DIAGNOSIS — K29.71 GASTROINTESTINAL HEMORRHAGE ASSOCIATED WITH GASTRITIS, UNSPECIFIED GASTRITIS TYPE: ICD-10-CM

## 2017-08-04 RX ORDER — MELOXICAM 7.5 MG/1
7.5 TABLET ORAL DAILY
Qty: 30 TAB | Refills: 5 | Status: SHIPPED | OUTPATIENT
Start: 2017-08-04 | End: 2018-11-14 | Stop reason: ALTCHOICE

## 2017-08-04 NOTE — PROGRESS NOTES
1. Have you been to the ER, urgent care clinic since your last visit? Hospitalized since your last visit? No    2. Have you seen or consulted any other health care providers outside of the [address] [place] [address] since your last visit? Include any pap smears or colon screening. No    Chief Complaint   Patient presents with    Bleeding/Bruising     bilateral forarms x 5 days     Pt states he has bruising of bilateral forearms x 5 day. This has been an off & on problem for a long time. Pt takes 845 mg BC powder 3 x per day.

## 2017-08-04 NOTE — MR AVS SNAPSHOT
Visit Information Date & Time Provider Department Dept. Phone Encounter #  
 8/4/2017 11:30 AM Abran Hashimoto, NP [facility] [phone] [id number] Your Appointments 10/5/2017  8:50 AM  
ESTABLISHED PATIENT with Haleigh Flores MD  
[address] [facility]) Appt Note: 3mo fuv  
 [address] [address] [phone]  
  
   
 [address] [address] Upcoming Health Maintenance Date Due  
 EYE EXAM RETINAL OR DILATED Q1 11/6/1955 DTaP/Tdap/Td series (1 - Tdap) 11/6/1966 ZOSTER VACCINE AGE 60> 9/6/2005 GLAUCOMA SCREENING Q2Y 11/6/2010 Pneumococcal 65+ Low/Medium Risk (1 of 2 - PCV13) 11/6/2010 FOBT Q 1 YEAR AGE 50-75 3/27/2017 INFLUENZA AGE 9 TO ADULT 8/1/2017 FOOT EXAM Q1 8/31/2017 MICROALBUMIN Q1 8/31/2017 MEDICARE YEARLY EXAM 12/6/2017 HEMOGLOBIN A1C Q6M 1/5/2018 LIPID PANEL Q1 7/5/2018 Allergies as of 8/4/2017  Review Complete On: 8/4/2017 By: Dante Chow LPN Severity Noted Reaction Type Reactions Pcn [Penicillins] Medium 12/12/2012   Systemic Rash  
 Sulfa (Sulfonamide Antibiotics) Medium 12/12/2012   Systemic Rash Demerol [Meperidine]  12/12/2012   Intolerance Nausea and Vomiting Severe vomiting Current Immunizations  Reviewed on 12/5/2016 Name Date Influenza High Dose Vaccine PF 12/5/2016 Not reviewed this visit You Were Diagnosed With   
  
 Codes Comments Osteoarthritis, unspecified osteoarthritis type, unspecified site    -  Primary ICD-10-CM: M19.90 ICD-9-CM: 715.90 Gastrointestinal hemorrhage associated with gastritis, unspecified gastritis type     ICD-10-CM: K29.71 ICD-9-CM: 535.51 Vitals BP Pulse Temp Resp Height(growth percentile) Weight(growth percentile) 114/74 67 98.3 °F (36.8 °C) (Oral) 16 6' 1\" (1.854 m) 235 lb 2 oz (106.7 kg) SpO2 BMI Smoking Status 96% 31.02 kg/m2 Former Smoker Vitals History BMI and BSA Data Body Mass Index Body Surface Area 31.02 kg/m 2 2.34 m 2 Preferred Pharmacy Pharmacy Name Phone [facility], [address] [phone] Your Updated Medication List  
  
   
This list is accurate as of: 8/4/17 11:49 AM.  Always use your most recent med list.  
  
  
  
  
 albuterol 90 mcg/actuation inhaler Commonly known as:  PROAIR HFA Take 1 Puff by inhalation every four (4) hours as needed. -65 mg Pwpk Generic drug:  Aspirin-Caffeine Take  by mouth three (3) times daily. budesonide-formoterol 160-4.5 mcg/actuation HFA inhaler Commonly known as:  SYMBICORT Take 2 Puffs by inhalation two (2) times a day. ferrous sulfate 325 mg (65 mg iron) tablet TAKE ONE TABLET BY MOUTH THREE TIMES A DAY WITH MEALS  
  
 glipiZIDE 5 mg tablet Commonly known as:  GLUCOTROL  
TAKE ONE TABLET BY MOUTH TWICE A DAY  
  
 losartan-hydroCHLOROthiazide 100-25 mg per tablet Commonly known as:  HYZAAR Take 1 Tab by mouth daily. Indications: hypertension  
  
 meloxicam 7.5 mg tablet Commonly known as:  MOBIC Take 1 Tab by mouth daily. pantoprazole 40 mg tablet Commonly known as:  PROTONIX  
TAKE ONE TABLET BY MOUTH TWICE A DAY  
  
 simvastatin 20 mg tablet Commonly known as:  ZOCOR Take 1 Tab by mouth nightly. Prescriptions Sent to Pharmacy Refills  
 meloxicam (MOBIC) 7.5 mg tablet 5 Sig: Take 1 Tab by mouth daily. Class: Normal  
 Pharmacy: [facility], [address] Ph #: [phone] Route: Oral  
  
Introducing [facility] & HEALTH SERVICES! Atul Pearl introduces [employer] patient portal. Now you can access parts of your medical record, email your doctor's office, and request medication refills online. 1. In your internet browser, go to [email or web address] 2. Click on the First Time User? Click Here link in the Sign In box. You will see the New Member Sign Up page. 3. Enter your CloudCar Access Code exactly as it appears below. You will not need to use this code after youve completed the sign-up process. If you do not sign up before the expiration date, you must request a new code. · CloudCar Access Code: [email or web address] Expires: 10/3/2017  8:17 AM 
 
4. Enter the last four digits of your Social Security Number (xxxx) and Date of Birth (mm/dd/yyyy) as indicated and click Submit. You will be taken to the next sign-up page. 5. Create a CloudCar ID. This will be your CloudCar login ID and cannot be changed, so think of one that is secure and easy to remember. 6. Create a CloudCar password. You can change your password at any time. 7. Enter your Password Reset Question and Answer. This can be used at a later time if you forget your password. 8. Enter your e-mail address. You will receive e-mail notification when new information is available in [address] 9. Click Sign Up. You can now view and download portions of your medical record. 10. Click the Download Summary menu link to download a portable copy of your medical information. If you have questions, please visit the Frequently Asked Questions section of the CloudCar website. Remember, CloudCar is NOT to be used for urgent needs. For medical emergencies, dial 911. Now available from your iPhone and Android! Please provide this summary of care documentation to your next provider. Your primary care clinician is listed as MERCEDEZ TEJADA. If you have any questions after today's visit, please call [phone].

## 2017-08-10 NOTE — PROGRESS NOTES
HISTORY OF PRESENT ILLNESS  Kingsley Menchaca is a 70 y.o. male. HPI  Pt states he has bruising of bilateral forearms x 5 day. This has been an off & on problem for a long time. Pt takes 845 mg BC powder 3 x per day. He was just discharged with GI bleed and anemia requiring 4 bags of blood for transfusion  Presents with daughter today    ROS  A comprehensive review of system was obtained and negative except findings in the HPI    Visit Vitals    /74    Pulse 67    Temp 98.3 °F (36.8 °C) (Oral)    Resp 16    Ht 6' 1\" (1.854 m)    Wt 235 lb 2 oz (106.7 kg)    SpO2 96%    BMI 31.02 kg/m2     Physical Exam   Constitutional: He is oriented to person, place, and time. He appears well-developed and well-nourished. No distress. Cardiovascular: Normal rate and regular rhythm. No murmur heard. Pulmonary/Chest: Breath sounds normal.   Neurological: He is alert and oriented to person, place, and time. Skin:   Superficial bruising of the arms bilaterally, some skin tears with bandaids presents of crhistopher arms   Nursing note and vitals reviewed. ASSESSMENT and PLAN  Encounter Diagnoses   Name Primary?  Osteoarthritis, unspecified osteoarthritis type, unspecified site Yes    Gastrointestinal hemorrhage associated with gastritis, unspecified gastritis type      Orders Placed This Encounter    Aspirin-Caffeine (BC) 845-65 mg pwpk    meloxicam (MOBIC) 7.5 mg tablet     Strong warning on nsaid use and GI bleeds  Changed to mobic and only tylenol if needed  No BC at all    I have discussed the diagnosis with the patient and the intended plan as seen in the above orders. The patient has received an after-visit summary and questions were answered concerning future plans. Patient conveyed understanding of the plan at the time of the visit.     Sosa Baldwin, MSN, ANP  8/10/2017

## 2017-08-20 DIAGNOSIS — K27.9 PUD (PEPTIC ULCER DISEASE): Chronic | ICD-10-CM

## 2017-08-21 RX ORDER — PANTOPRAZOLE SODIUM 40 MG/1
TABLET, DELAYED RELEASE ORAL
Qty: 60 TAB | Refills: 3 | Status: SHIPPED | OUTPATIENT
Start: 2017-08-21 | End: 2018-11-14 | Stop reason: ALTCHOICE

## 2017-10-05 ENCOUNTER — HOSPITAL ENCOUNTER (OUTPATIENT)
Dept: LAB | Age: 72
Discharge: HOME | End: 2017-10-05
Payer: MEDICARE

## 2017-10-05 ENCOUNTER — OFFICE VISIT (OUTPATIENT)
Dept: FAMILY MEDICINE CLINIC | Age: 72
End: 2017-10-05

## 2017-10-05 VITALS
TEMPERATURE: 99 F | WEIGHT: 244.44 LBS | DIASTOLIC BLOOD PRESSURE: 80 MMHG | HEART RATE: 69 BPM | SYSTOLIC BLOOD PRESSURE: 133 MMHG | OXYGEN SATURATION: 99 % | BODY MASS INDEX: 32.4 KG/M2 | HEIGHT: 73 IN | RESPIRATION RATE: 22 BRPM

## 2017-10-05 DIAGNOSIS — D50.0 IRON DEFICIENCY ANEMIA DUE TO CHRONIC BLOOD LOSS: Chronic | ICD-10-CM

## 2017-10-05 DIAGNOSIS — E11.65 TYPE 2 DIABETES MELLITUS WITH HYPERGLYCEMIA, WITHOUT LONG-TERM CURRENT USE OF INSULIN (HCC): Primary | Chronic | ICD-10-CM

## 2017-10-05 DIAGNOSIS — I10 ESSENTIAL HYPERTENSION: Chronic | ICD-10-CM

## 2017-10-05 DIAGNOSIS — Z23 ENCOUNTER FOR IMMUNIZATION: ICD-10-CM

## 2017-10-05 PROCEDURE — 80061 LIPID PANEL: CPT

## 2017-10-05 PROCEDURE — 80053 COMPREHEN METABOLIC PANEL: CPT

## 2017-10-05 PROCEDURE — 85025 COMPLETE CBC W/AUTO DIFF WBC: CPT

## 2017-10-05 PROCEDURE — 84443 ASSAY THYROID STIM HORMONE: CPT

## 2017-10-05 PROCEDURE — 82043 UR ALBUMIN QUANTITATIVE: CPT

## 2017-10-05 PROCEDURE — 83036 HEMOGLOBIN GLYCOSYLATED A1C: CPT

## 2017-10-05 NOTE — MR AVS SNAPSHOT
Visit Information Date & Time Provider Department Dept. Phone Encounter #  
 10/5/2017  8:50 AM James Esparza MD [facility] [phone] [id number] Follow-up Instructions Return in about 3 months (around 1/5/2018). Upcoming Health Maintenance Date Due  
 EYE EXAM RETINAL OR DILATED Q1 11/6/1955 DTaP/Tdap/Td series (1 - Tdap) 11/6/1966 ZOSTER VACCINE AGE 60> 9/6/2005 GLAUCOMA SCREENING Q2Y 11/6/2010 Pneumococcal 65+ Low/Medium Risk (1 of 2 - PCV13) 11/6/2010 FOBT Q 1 YEAR AGE 50-75 3/27/2017 INFLUENZA AGE 9 TO ADULT 8/1/2017 FOOT EXAM Q1 8/31/2017 MICROALBUMIN Q1 8/31/2017 MEDICARE YEARLY EXAM 12/6/2017 HEMOGLOBIN A1C Q6M 1/5/2018 LIPID PANEL Q1 7/5/2018 Allergies as of 10/5/2017  Review Complete On: 10/5/2017 By: James Esparza MD  
  
 Severity Noted Reaction Type Reactions Pcn [Penicillins] Medium 12/12/2012   Systemic Rash  
 Sulfa (Sulfonamide Antibiotics) Medium 12/12/2012   Systemic Rash Demerol [Meperidine]  12/12/2012   Intolerance Nausea and Vomiting Severe vomiting Current Immunizations  Reviewed on 12/5/2016 Name Date Influenza High Dose Vaccine PF  Incomplete, 12/5/2016 Not reviewed this visit You Were Diagnosed With   
  
 Codes Comments Type 2 diabetes mellitus with hyperglycemia, without long-term current use of insulin (HCC)    -  Primary ICD-10-CM: E11.65 ICD-9-CM: 250.00, 790.29 Essential hypertension     ICD-10-CM: I10 
ICD-9-CM: 401.9 Iron deficiency anemia due to chronic blood loss     ICD-10-CM: D50.0 ICD-9-CM: 280.0 Encounter for immunization     ICD-10-CM: E14 ICD-9-CM: V03.89 Vitals BP Pulse Temp Resp Height(growth percentile) Weight(growth percentile) 133/80 69 99 °F (37.2 °C) (Oral) 22 6' 1\" (1.854 m) 244 lb 7 oz (110.9 kg) SpO2 BMI Smoking Status 99% 32.25 kg/m2 Former Smoker Vitals History BMI and BSA Data Body Mass Index Body Surface Area  
 32.25 kg/m 2 2.39 m 2 Preferred Pharmacy Pharmacy Name Phone Susanne Vega [facility], [address] [phone] Your Updated Medication List  
  
   
This list is accurate as of: 10/5/17  9:41 AM.  Always use your most recent med list.  
  
  
  
  
 albuterol 90 mcg/actuation inhaler Commonly known as:  PROAIR HFA Take 1 Puff by inhalation every four (4) hours as needed. -65 mg Pwpk Generic drug:  Aspirin-Caffeine Take  by mouth three (3) times daily. budesonide-formoterol 160-4.5 mcg/actuation Hfaa Commonly known as:  SYMBICORT Take 2 Puffs by inhalation two (2) times a day. ferrous sulfate 325 mg (65 mg iron) tablet TAKE ONE TABLET BY MOUTH THREE TIMES A DAY WITH MEALS  
  
 glipiZIDE 5 mg tablet Commonly known as:  GLUCOTROL  
TAKE ONE TABLET BY MOUTH TWICE A DAY  
  
 losartan-hydroCHLOROthiazide 100-25 mg per tablet Commonly known as:  HYZAAR Take 1 Tab by mouth daily. Indications: hypertension  
  
 meloxicam 7.5 mg tablet Commonly known as:  MOBIC Take 1 Tab by mouth daily. pantoprazole 40 mg tablet Commonly known as:  PROTONIX  
TAKE ONE TABLET BY MOUTH TWICE A DAY  
  
 simvastatin 20 mg tablet Commonly known as:  ZOCOR Take 1 Tab by mouth nightly. We Performed the Following ADMIN INFLUENZA VIRUS VAC [ HCP] CBC WITH AUTOMATED DIFF [69746 CPT(R)] HEMOGLOBIN A1C WITH EAG [75841 CPT(R)] INFLUENZA VIRUS VACCINE, HIGH DOSE SEASONAL, PRESERVATIVE FREE [40651 CPT(R)] LIPID PANEL [63324 CPT(R)] METABOLIC PANEL, COMPREHENSIVE [23869 CPT(R)] MICROALBUMIN, UR, RAND W/ MICROALBUMIN/CREA RATIO V7013132 CPT(R)] REFERRAL TO OPHTHALMOLOGY [REF57 Custom] TSH 3RD GENERATION [15667 CPT(R)] Follow-up Instructions Return in about 3 months (around 1/5/2018). Referral Information Referral ID Referred By Referred To  
  
 [id number] MERCEDEZ TEJADA Not Available Visits Status Start Date End Date 1 New Request 10/5/17 10/5/18 If your referral has a status of pending review or denied, additional information will be sent to support the outcome of this decision. Patient Instructions Hold cholesterol medication for 3 months to see if memory gets better. Introducing [facility] & HEALTH SERVICES! Xuan Cohen introduces [facility] [employer] [facility] patient portal. Now you can access parts of your medical record, email your doctor's office, and request medication refills online. 1. In your internet browser, go to [email or web address] 2. Click on the First Time User? Click Here link in the Sign In box. You will see the New Member Sign Up page. 3. Enter your [facility] Access Code exactly as it appears below. You will not need to use this code after youve completed the sign-up process. If you do not sign up before the expiration date, you must request a new code. · [employer] [facility] Access Code: [email or web address] Expires: 1/3/2018  9:39 AM 
 
4. Enter the last four digits of your Social Security Number (xxxx) and Date of Birth (mm/dd/yyyy) as indicated and click Submit. You will be taken to the next sign-up page. 5. Create a [facility] [employer] [facility]) ID. This will be your [facility] [employer] [facility] login ID and cannot be changed, so think of one that is secure and easy to remember. 6. Create a [facility] [employer] [facility]) password. You can change your password at any time. 7. Enter your Password Reset Question and Answer. This can be used at a later time if you forget your password. 8. Enter your e-mail address. You will receive e-mail notification when new information is available in [address] 9. Click Sign Up. You can now view and download portions of your medical record. 10. Click the Download Summary menu link to download a portable copy of your medical information. If you have questions, please visit the Frequently Asked Questions section of the Sharethrought website. Remember, [employer]e is NOT to be used for urgent needs. For medical emergencies, dial 911. Now available from your iPhone and Android! Please provide this summary of care documentation to your next provider. Your primary care clinician is listed as MERCEDEZ TEJADA. If you have any questions after today's visit, please call [phone].

## 2017-10-05 NOTE — PROGRESS NOTES
1. Have you been to the ER, urgent care clinic since your last visit? Hospitalized since your last visit? No    2. Have you seen or consulted any other health care providers outside of the [address] [place] [address] since your last visit? Include any pap smears or colon screening. No.    Chief Complaint   Patient presents with    Labs     labs- pt is fasting    Medication Refill     pt would like to talk about getting some Tylenol # 3 order       Inc memory issues    Check DM and bp is ok at home    On chol rx    Lab Results   Component Value Date/Time    Microalb/Creat ratio (ug/mg creat.) 12.2 08/31/2016 07:54 AM      Chief Complaint   Patient presents with   [place]     labs- pt is fasting    Medication Refill     pt would like to talk about getting some Tylenol # 3 order     he is a 70y.o. year old male who presents for evaluation. See Diabetic Report Card listed above. Patient Active Problem List    Diagnosis    Type 2 diabetes mellitus with hyperglycemia, without long-term current use of insulin ([facility]    Osteoarthritis    Advance care planning    Dementia without behavioral disturbance    PUD (peptic ulcer disease)    Iron deficiency anemia    Candidal esophagitis ([facility]    COPD (chronic obstructive pulmonary disease) ([facility]    HTN (hypertension)    Blood loss anemia    GI bleed       Reviewed PmHx, RxHx, FmHx, SocHx, AllgHx--dated and updated in the chart.     Review of Systems - negative except as listed above in the HPI    Objective:     Vitals:    10/05/17 0921 10/05/17 0937   BP: (!) 185/102 133/80   Pulse: 69    Resp: 22    Temp: 99 °F (37.2 °C)    TempSrc: Oral    SpO2: 99%    Weight: 244 lb 7 oz (110.9 kg)    Height: 6' 1\" (1.854 m)      Physical Examination: General appearance - alert, well appearing, and in no distress  Chest - clear to auscultation, no wheezes, rales or rhonchi, symmetric air entry  Heart - normal rate, regular rhythm, normal S1, S2, no murmurs, rubs, clicks or gallops  Abdomen - soft, nontender, nondistended, no masses or organomegaly  Extremities - peripheral pulses normal, no pedal edema, no clubbing or cyanosis  Foot Exam:  Feet were examined, no sensory or circulatory issues, no ulcers noted  Assessment/ Plan:   Diagnoses and all orders for this visit:    1. Type 2 diabetes mellitus with hyperglycemia, without long-term current use of insulin (HCC)  -     LIPID PANEL  -     METABOLIC PANEL, COMPREHENSIVE  -     HEMOGLOBIN A1C WITH EAG  -     CBC WITH AUTOMATED DIFF  -     TSH 3RD GENERATION  -     MICROALBUMIN, UR, RAND W/ MICROALBUMIN/CREA RATIO  -     REFERRAL TO OPHTHALMOLOGY  -hold chol rx to see if memory get better  -I discussed with the patient the new \"Diabetes Full [place]\" outreach program.  Patient has agreed to participate and understands expectations and goals  Our brochure, along with the listed current labs and standards of care, was given to the patient. The patient also met with Chuy Espinoza ([facility]), who will be contacting the patient outside of their appointments. 2. Essential hypertension  -     LIPID PANEL  -     METABOLIC PANEL, COMPREHENSIVE  -at goal    3. Iron deficiency anemia due to chronic blood loss  -     CBC WITH AUTOMATED DIFF    4. Encounter for immunization  -     Influenza virus vaccine ([address]) 72 years and older ([address])  -     Administration fee () for Medicare insured patients       Follow-up Disposition:  Return in about 3 months (around 1/5/2018).   Lab Results   Component Value Date/Time    Cholesterol, total 197 07/05/2017 09:06 AM    HDL Cholesterol 36 07/05/2017 09:06 AM    LDL, calculated 138 07/05/2017 09:06 AM    Triglyceride 114 07/05/2017 09:06 AM     Lab Results   Component Value Date/Time    Hemoglobin A1c 6.8 07/05/2017 09:06 AM    Hemoglobin A1c 6.5 03/06/2017 07:49 AM    Hemoglobin A1c 7.7 12/05/2016 08:03 AM    Microalb/Creat ratio (ug/mg creat.) 12.2 08/31/2016 07:54 AM    LDL, calculated 138 07/05/2017 09:06 AM    Creatinine (POC) 0.9 08/02/2013 11:38 AM    Creatinine 1.13 07/05/2017 09:06 AM          Discussed with patient goal of Diabetes to include:  HgA1C <7, LDL cholesterol <70, Blood pressure <130/80. Discussed with patient diet and weight management and to get regular exercise. Recommend yearly eye exams and daily foot care. The patient understands and agrees with the plan. I have discussed the diagnosis with the patient and the intended plan as seen in the above orders. The patient has received an after-visit summary and questions were answered concerning future plans. Medication Side Effects and Warnings were discussed with patient  Patient Labs were reviewed and or requested  Patient Past Records were reviewed and or requested    Calvin Loredo M.D. [facility]    There are no Patient Instructions on file for this visit.

## 2017-10-06 LAB
ALBUMIN SERPL-MCNC: 4.2 G/DL (ref 3.5–4.8)
ALBUMIN/CREAT UR: ABNORMAL MG/G CREAT (ref 0–30)
ALBUMIN/GLOB SERPL: 1.6 {RATIO} (ref 1.2–2.2)
ALP SERPL-CCNC: 62 IU/L (ref 39–117)
ALT SERPL-CCNC: 8 IU/L (ref 0–44)
AST SERPL-CCNC: 12 IU/L (ref 0–40)
BASOPHILS # BLD AUTO: 0 X10E3/UL (ref 0–0.2)
BASOPHILS NFR BLD AUTO: 0 %
BILIRUB SERPL-MCNC: 1.7 MG/DL (ref 0–1.2)
BUN SERPL-MCNC: 20 MG/DL (ref 8–27)
BUN/CREAT SERPL: 19 (ref 10–24)
CALCIUM SERPL-MCNC: 9 MG/DL (ref 8.6–10.2)
CHLORIDE SERPL-SCNC: 96 MMOL/L (ref 96–106)
CHOLEST SERPL-MCNC: 158 MG/DL (ref 100–199)
CO2 SERPL-SCNC: 26 MMOL/L (ref 18–29)
CREAT SERPL-MCNC: 1.05 MG/DL (ref 0.76–1.27)
CREAT UR-MCNC: 83.5 MG/DL
EOSINOPHIL # BLD AUTO: 0 X10E3/UL (ref 0–0.4)
EOSINOPHIL NFR BLD AUTO: 0 %
ERYTHROCYTE [DISTWIDTH] IN BLOOD BY AUTOMATED COUNT: 13.2 % (ref 12.3–15.4)
EST. AVERAGE GLUCOSE BLD GHB EST-MCNC: 177 MG/DL
GLOBULIN SER CALC-MCNC: 2.6 G/DL (ref 1.5–4.5)
GLUCOSE SERPL-MCNC: 130 MG/DL (ref 65–99)
HBA1C MFR BLD: 7.8 % (ref 4.8–5.6)
HCT VFR BLD AUTO: 40 % (ref 37.5–51)
HDLC SERPL-MCNC: 38 MG/DL
HGB BLD-MCNC: 14.2 G/DL (ref 12.6–17.7)
IMM GRANULOCYTES # BLD: 0 X10E3/UL (ref 0–0.1)
IMM GRANULOCYTES NFR BLD: 0 %
INTERPRETATION, 910389: NORMAL
LDLC SERPL CALC-MCNC: 97 MG/DL (ref 0–99)
LYMPHOCYTES # BLD AUTO: 1 X10E3/UL (ref 0.7–3.1)
LYMPHOCYTES NFR BLD AUTO: 10 %
Lab: NORMAL
MCH RBC QN AUTO: 32.7 PG (ref 26.6–33)
MCHC RBC AUTO-ENTMCNC: 35.5 G/DL (ref 31.5–35.7)
MCV RBC AUTO: 92 FL (ref 79–97)
MICROALBUMIN UR-MCNC: <3 UG/ML
MONOCYTES # BLD AUTO: 0.6 X10E3/UL (ref 0.1–0.9)
MONOCYTES NFR BLD AUTO: 6 %
NEUTROPHILS # BLD AUTO: 8.6 X10E3/UL (ref 1.4–7)
NEUTROPHILS NFR BLD AUTO: 84 %
PLATELET # BLD AUTO: 188 X10E3/UL (ref 150–379)
POTASSIUM SERPL-SCNC: 3.1 MMOL/L (ref 3.5–5.2)
PROT SERPL-MCNC: 6.8 G/DL (ref 6–8.5)
RBC # BLD AUTO: 4.34 X10E6/UL (ref 4.14–5.8)
SODIUM SERPL-SCNC: 139 MMOL/L (ref 134–144)
TRIGL SERPL-MCNC: 114 MG/DL (ref 0–149)
TSH SERPL DL<=0.005 MIU/L-ACNC: 0.84 UIU/ML (ref 0.45–4.5)
VLDLC SERPL CALC-MCNC: 23 MG/DL (ref 5–40)
WBC # BLD AUTO: 10.2 X10E3/UL (ref 3.4–10.8)

## 2017-10-10 ENCOUNTER — TELEPHONE (OUTPATIENT)
Dept: FAMILY MEDICINE CLINIC | Age: 72
End: 2017-10-10

## 2017-10-11 ENCOUNTER — TELEPHONE (OUTPATIENT)
Dept: FAMILY MEDICINE CLINIC | Age: 72
End: 2017-10-11

## 2017-10-12 ENCOUNTER — TELEPHONE (OUTPATIENT)
Dept: FAMILY MEDICINE CLINIC | Age: 72
End: 2017-10-12

## 2017-10-12 RX ORDER — METFORMIN HYDROCHLORIDE 500 MG/1
500 TABLET, EXTENDED RELEASE ORAL
Qty: 30 TAB | Refills: 2 | Status: SHIPPED | OUTPATIENT
Start: 2017-10-12 | End: 2018-01-16 | Stop reason: SDUPTHER

## 2017-10-12 NOTE — TELEPHONE ENCOUNTER
See result note from labs.  Spoke to pts daughter, Kellen Noel (on [facility]) per wife's request.

## 2017-10-12 NOTE — PROGRESS NOTES
Spoke to [facility] (pts daughter, on [facility]) to review lab results. Recommend starting metformin 500mg xr daily with dinner. Diet and exercise recommendations reviewed. Discussed with patient goal of Diabetes to include: HgA1C <7, LDL cholesterol <100, Blood pressure <140/80. Discussed with patient diet and weight management and to get regular exercise. Recommend yearly eye exams and daily foot care. The patient understands and agrees with the plan.

## 2017-11-01 ENCOUNTER — TELEPHONE (OUTPATIENT)
Dept: FAMILY MEDICINE CLINIC | Age: 72
End: 2017-11-01

## 2018-01-05 ENCOUNTER — OFFICE VISIT (OUTPATIENT)
Dept: FAMILY MEDICINE CLINIC | Age: 73
End: 2018-01-05

## 2018-01-05 VITALS
RESPIRATION RATE: 18 BRPM | HEIGHT: 73 IN | BODY MASS INDEX: 31.81 KG/M2 | SYSTOLIC BLOOD PRESSURE: 120 MMHG | DIASTOLIC BLOOD PRESSURE: 82 MMHG | TEMPERATURE: 99 F | OXYGEN SATURATION: 98 % | WEIGHT: 240 LBS | HEART RATE: 74 BPM

## 2018-01-05 DIAGNOSIS — D50.0 IRON DEFICIENCY ANEMIA DUE TO CHRONIC BLOOD LOSS: Chronic | ICD-10-CM

## 2018-01-05 DIAGNOSIS — E11.65 TYPE 2 DIABETES MELLITUS WITH HYPERGLYCEMIA, WITHOUT LONG-TERM CURRENT USE OF INSULIN (HCC): Primary | Chronic | ICD-10-CM

## 2018-01-05 DIAGNOSIS — M19.90 OSTEOARTHRITIS, UNSPECIFIED OSTEOARTHRITIS TYPE, UNSPECIFIED SITE: ICD-10-CM

## 2018-01-05 RX ORDER — DICLOFENAC SODIUM 75 MG/1
75 TABLET, DELAYED RELEASE ORAL
Qty: 60 TAB | Refills: 2 | Status: SHIPPED | OUTPATIENT
Start: 2018-01-05 | End: 2019-07-09 | Stop reason: ALTCHOICE

## 2018-01-05 NOTE — MR AVS SNAPSHOT
Visit Information Date & Time Provider Department Dept. Phone Encounter #  
 1/5/2018  2:20 PM Daria Reina, Elvi3 BRAEDEN Barlow [phone] [id number] Follow-up Instructions Return in about 3 months (around 4/5/2018), or if symptoms worsen or fail to improve. Your Appointments 1/9/2018 10:50 AM  
ESTABLISHED PATIENT with Nisa Abad MD  
[address] [facility] Appt Note: wife is concerned of hemogoblin levels, cole 1.5.17  
 [address] [address] [phone]  
  
   
 [address] [address] Upcoming Health Maintenance Date Due  
 EYE EXAM RETINAL OR DILATED Q1 11/6/1955 DTaP/Tdap/Td series (1 - Tdap) 11/6/1966 ZOSTER VACCINE AGE 60> 9/6/2005 GLAUCOMA SCREENING Q2Y 11/6/2010 Pneumococcal 65+ Low/Medium Risk (1 of 2 - PCV13) 11/6/2010 FOBT Q 1 YEAR AGE 50-75 3/27/2017 FOOT EXAM Q1 8/31/2017 MEDICARE YEARLY EXAM 12/6/2017 HEMOGLOBIN A1C Q6M 4/5/2018 MICROALBUMIN Q1 10/5/2018 LIPID PANEL Q1 10/5/2018 Allergies as of 1/5/2018  Review Complete On: 1/5/2018 By: Daria Reina DO Severity Noted Reaction Type Reactions Pcn [Penicillins] Medium 12/12/2012   Systemic Rash  
 Sulfa (Sulfonamide Antibiotics) Medium 12/12/2012   Systemic Rash Demerol [Meperidine]  12/12/2012   Intolerance Nausea and Vomiting Severe vomiting Current Immunizations  Reviewed on 12/5/2016 Name Date Influenza High Dose Vaccine PF 10/5/2017, 12/5/2016 Not reviewed this visit You Were Diagnosed With   
  
 Codes Comments Type 2 diabetes mellitus with hyperglycemia, without long-term current use of insulin (HCC)    -  Primary ICD-10-CM: E11.65 ICD-9-CM: 250.00, 790.29 Iron deficiency anemia due to chronic blood loss     ICD-10-CM: D50.0 ICD-9-CM: 280.0  Osteoarthritis, unspecified osteoarthritis type, unspecified site     ICD-10-CM: M19.90 
 ICD-9-CM: 715.90 Vitals BP Pulse Temp Resp Height(growth percentile) Weight(growth percentile) 120/82 74 99 °F (37.2 °C) (Oral) 18 6' 1\" (1.854 m) 240 lb (108.9 kg) SpO2 BMI Smoking Status 98% 31.66 kg/m2 Former Smoker Vitals History BMI and BSA Data Body Mass Index Body Surface Area  
 31.66 kg/m 2 2.37 m 2 Preferred Pharmacy Pharmacy Name Phone [facility] [address] [phone] Your Updated Medication List  
  
   
This list is accurate as of: 1/5/18  2:32 PM.  Always use your most recent med list.  
  
  
  
  
 albuterol 90 mcg/actuation inhaler Commonly known as:  PROAIR HFA Take 1 Puff by inhalation every four (4) hours as needed. -65 mg Pwpk Generic drug:  Aspirin-Caffeine Take  by mouth three (3) times daily. budesonide-formoterol 160-4.5 mcg/actuation Hfaa Commonly known as:  SYMBICORT Take 2 Puffs by inhalation two (2) times a day. diclofenac EC 75 mg EC tablet Commonly known as:  VOLTAREN Take 1 Tab by mouth two (2) times daily as needed. Pt to not use BC powder with this  
  
 ferrous sulfate 325 mg (65 mg iron) tablet TAKE ONE TABLET BY MOUTH THREE TIMES A DAY WITH MEALS  
  
 glipiZIDE 5 mg tablet Commonly known as:  GLUCOTROL  
TAKE ONE TABLET BY MOUTH TWICE A DAY  
  
 losartan-hydroCHLOROthiazide 100-25 mg per tablet Commonly known as:  HYZAAR Take 1 Tab by mouth daily. Indications: hypertension  
  
 meloxicam 7.5 mg tablet Commonly known as:  MOBIC Take 1 Tab by mouth daily. metFORMIN  mg tablet Commonly known as:  GLUCOPHAGE XR Take 1 Tab by mouth daily (with dinner). Indications: type 2 diabetes mellitus  
  
 pantoprazole 40 mg tablet Commonly known as:  PROTONIX  
TAKE ONE TABLET BY MOUTH TWICE A DAY  
  
 simvastatin 20 mg tablet Commonly known as:  ZOCOR Take 1 Tab by mouth nightly. Prescriptions Sent to Pharmacy Refills  
 diclofenac EC (VOLTAREN) 75 mg EC tablet 2 Sig: Take 1 Tab by mouth two (2) times daily as needed. Pt to not use BC powder with this Class: Normal  
 Pharmacy: [facility] [place], [place] [address] Ph #: [phone] Route: Oral  
  
We Performed the Following CBC WITH AUTOMATED DIFF [08910 CPT(R)] HEMOGLOBIN A1C WITH EAG [72907 CPT(R)] Follow-up Instructions Return in about 3 months (around 4/5/2018), or if symptoms worsen or fail to improve. Patient Instructions Arthritis: Care Instructions Your Care Instructions Arthritis, also called osteoarthritis, is a breakdown of the cartilage that cushions your joints. When the cartilage wears down, your bones rub against each other. This causes pain and stiffness. Many people have some arthritis as they age. Arthritis most often affects the joints of the spine, hands, hips, knees, or feet. You can take simple measures to protect your joints, ease your pain, and help you stay active. Follow-up care is a key part of your treatment and safety. Be sure to make and go to all appointments, and call your doctor if you are having problems. It's also a good idea to know your test results and keep a list of the medicines you take. How can you care for yourself at home? · Stay at a healthy weight. Being overweight puts extra strain on your joints. · Talk to your doctor or physical therapist about exercises that will help ease joint pain. ¨ Stretch. You may enjoy gentle forms of yoga to help keep your joints and muscles flexible. ¨ Walk instead of jog. Other types of exercise that are less stressful on the joints include riding a bicycle, swimming, johnny chi, or water exercise. ¨ Lift weights. Strong muscles help reduce stress on your joints.  Stronger thigh muscles, for example, take some of the stress off of the knees and hips. Learn the right way to lift weights so you do not make joint pain worse. · Take your medicines exactly as prescribed. Call your doctor if you think you are having a problem with your medicine. · Take pain medicines exactly as directed. ¨ If the doctor gave you a prescription medicine for pain, take it as prescribed. ¨ If you are not taking a prescription pain medicine, ask your doctor if you can take an over-the-counter medicine. · Use a cane, crutch, walker, or another device if you need help to get around. These can help rest your joints. You also can use other things to make life easier, such as a higher toilet seat and padded handles on kitchen utensils. · Do not sit in low chairs, which can make it hard to get up. · Put heat or cold on your sore joints as needed. Use whichever helps you most. You also can take turns with hot and cold packs. ¨ Apply heat 2 or 3 times a day for 20 to 30 minutes-using a heating pad, hot shower, or hot pack-to relieve pain and stiffness. ¨ Put ice or a cold pack on your sore joint for 10 to 20 minutes at a time. Put a thin cloth between the ice and your skin. When should you call for help? Call your doctor now or seek immediate medical care if: 
? · You have sudden swelling, warmth, or pain in any joint. ? · You have joint pain and a fever or rash. ? · You have such bad pain that you cannot use a joint. ? Watch closely for changes in your health, and be sure to contact your doctor if: 
? · You have mild joint symptoms that continue even with more than 6 weeks of care at home. ? · You have stomach pain or other problems with your medicine. Where can you learn more? Go to http://usman-brooklyn.info/. Enter M427 in the search box to learn more about \"Arthritis: Care Instructions. \" Current as of: October 31, 2016 Content Version: 11.4 © 0481-9215 Healthwise, Incorporated.  Care instructions adapted under license by [address] (which disclaims liability or warranty for this information). If you have questions about a medical condition or this instruction, always ask your healthcare professional. [address] any warranty or liability for your use of this information. Introducing \[facility]\"" & HEALTH SERVICES! Sara Brewer introduces [employer] patient portal. Now you can access parts of your medical record, email your doctor's office, and request medication refills online. 1. In your internet browser, go to [email or web address] 2. Click on the First Time User? Click Here link in the Sign In box. You will see the New Member Sign Up page. 3. Enter your [employer] Access Code exactly as it appears below. You will not need to use this code after youve completed the sign-up process. If you do not sign up before the expiration date, you must request a new code. · [employer] Access Code: [email or web address] Expires: 4/5/2018  2:32 PM 
 
4. Enter the last four digits of your Social Security Number (xxxx) and Date of Birth (mm/dd/yyyy) as indicated and click Submit. You will be taken to the next sign-up page. 5. Create a Syn[employer]gen ID. This will be your [employer] login ID and cannot be changed, so think of one that is secure and easy to remember. 6. Create a [employer] password. You can change your password at any time. 7. Enter your Password Reset Question and Answer. This can be used at a later time if you forget your password. 8. Enter your e-mail address. You will receive e-mail notification when new information is available in [address] 9. Click Sign Up. You can now view and download portions of your medical record. 10. Click the Download Summary menu link to download a portable copy of your medical information. If you have questions, please visit the Frequently Asked Questions section of the [employer] website.  Remember, [employer] is NOT to be used for urgent needs. For medical emergencies, dial 911. Now available from your iPhone and Android! Please provide this summary of care documentation to your next provider. Your primary care clinician is listed as MERCEDEZ TEJADA. If you have any questions after today's visit, please call [phone].

## 2018-01-05 NOTE — PROGRESS NOTES
Leanne Cooley is a 67 y.o. male   Chief Complaint   Patient presents with    Memory Loss    Labs    spoke with wife on phone since pt is a poor historian. Pt has a hx of anemia and was normal at last check and wife would like rechecked. Pt also due for his diabetes recheck and pt has been using his metformin. Pt is also getting a lot of bruising but pt is frequently using asa as BC powders and pt advised that he should decrease his use of this. Pt reports arthritis in multiple joints will switch pt off of BC powder  Per wife pt does not eat much and we discussed using boost glucose control for as supplement and will give coupons to  to give to wife  he is a 67y.o. year old male who presents for evalution. Reviewed PmHx, RxHx, FmHx, SocHx, AllgHx and updated and dated in the chart. Review of Systems - negative except as listed above in the HPI    Objective:     Vitals:    01/05/18 1412   BP: 120/82   Pulse: 74   Resp: 18   Temp: 99 °F (37.2 °C)   TempSrc: Oral   SpO2: 98%   Weight: 240 lb (108.9 kg)   Height: 6' 1\" (1.854 m)       Current Outpatient Prescriptions   Medication Sig    diclofenac EC (VOLTAREN) 75 mg EC tablet Take 1 Tab by mouth two (2) times daily as needed. Pt to not use BC powder with this    metFORMIN ER (GLUCOPHAGE XR) 500 mg tablet Take 1 Tab by mouth daily (with dinner). Indications: type 2 diabetes mellitus    pantoprazole (PROTONIX) 40 mg tablet TAKE ONE TABLET BY MOUTH TWICE A DAY    Aspirin-Caffeine (BC) 845-65 mg pwpk Take  by mouth three (3) times daily.  meloxicam (MOBIC) 7.5 mg tablet Take 1 Tab by mouth daily.  simvastatin (ZOCOR) 20 mg tablet Take 1 Tab by mouth nightly.  budesonide-formoterol (SYMBICORT) 160-4.5 mcg/actuation HFA inhaler Take 2 Puffs by inhalation two (2) times a day.  glipiZIDE (GLUCOTROL) 5 mg tablet TAKE ONE TABLET BY MOUTH TWICE A DAY    losartan-hydroCHLOROthiazide (HYZAAR) 100-25 mg per tablet Take 1 Tab by mouth daily. Indications: hypertension    ferrous sulfate 325 mg (65 mg iron) tablet TAKE ONE TABLET BY MOUTH THREE TIMES A DAY WITH MEALS (Patient taking differently: TAKE ONE TABLET BY MOUTH TWO TIMES A DAY WITH MEALS)    albuterol (PROAIR HFA) 90 mcg/actuation inhaler Take 1 Puff by inhalation every four (4) hours as needed. No current facility-administered medications for this visit. Physical Examination: General appearance - alert, well appearing, and in no distress  Eyes - pupils equal and reactive, extraocular eye movements intact  Chest - clear to auscultation, no wheezes, rales or rhonchi, symmetric air entry  Heart - normal rate, regular rhythm, normal S1, S2, no murmurs, rubs, clicks or gallops  Musculoskeletal - no joint tenderness, deformity or swelling      Assessment/ Plan:   Diagnoses and all orders for this visit:    1. Type 2 diabetes mellitus with hyperglycemia, without long-term current use of insulin (HCC)  -     HEMOGLOBIN A1C WITH EAG    2. Iron deficiency anemia due to chronic blood loss  -     CBC WITH AUTOMATED DIFF    3. Osteoarthritis, unspecified osteoarthritis type, unspecified site  -     diclofenac EC (VOLTAREN) 75 mg EC tablet; Take 1 Tab by mouth two (2) times daily as needed. Pt to not use BC powder with this      Follow-up Disposition:  Return in about 3 months (around 4/5/2018), or if symptoms worsen or fail to improve. I have discussed the diagnosis with the patient and the intended plan as seen in the above orders. The patient has received an after-visit summary and questions were answered concerning future plans. Pt conveyed understanding of plan.     Medication Side Effects and Warnings were discussed with patient      Aura Mackenzie DO

## 2018-01-05 NOTE — PATIENT INSTRUCTIONS
Arthritis: Care Instructions  Your Care Instructions  Arthritis, also called osteoarthritis, is a breakdown of the cartilage that cushions your joints. When the cartilage wears down, your bones rub against each other. This causes pain and stiffness. Many people have some arthritis as they age. Arthritis most often affects the joints of the spine, hands, hips, knees, or feet. You can take simple measures to protect your joints, ease your pain, and help you stay active. Follow-up care is a key part of your treatment and safety. Be sure to make and go to all appointments, and call your doctor if you are having problems. It's also a good idea to know your test results and keep a list of the medicines you take. How can you care for yourself at home? · Stay at a healthy weight. Being overweight puts extra strain on your joints. · Talk to your doctor or physical therapist about exercises that will help ease joint pain. ¨ Stretch. You may enjoy gentle forms of yoga to help keep your joints and muscles flexible. ¨ Walk instead of jog. Other types of exercise that are less stressful on the joints include riding a bicycle, swimming, johnny chi, or water exercise. ¨ Lift weights. Strong muscles help reduce stress on your joints. Stronger thigh muscles, for example, take some of the stress off of the knees and hips. Learn the right way to lift weights so you do not make joint pain worse. · Take your medicines exactly as prescribed. Call your doctor if you think you are having a problem with your medicine. · Take pain medicines exactly as directed. ¨ If the doctor gave you a prescription medicine for pain, take it as prescribed. ¨ If you are not taking a prescription pain medicine, ask your doctor if you can take an over-the-counter medicine. · Use a cane, crutch, walker, or another device if you need help to get around. These can help rest your joints.  You also can use other things to make life easier, such as a higher toilet seat and padded handles on kitchen utensils. · Do not sit in low chairs, which can make it hard to get up. · Put heat or cold on your sore joints as needed. Use whichever helps you most. You also can take turns with hot and cold packs. ¨ Apply heat 2 or 3 times a day for 20 to 30 minutes-using a heating pad, hot shower, or hot pack-to relieve pain and stiffness. ¨ Put ice or a cold pack on your sore joint for 10 to 20 minutes at a time. Put a thin cloth between the ice and your skin. When should you call for help? Call your doctor now or seek immediate medical care if:  ? · You have sudden swelling, warmth, or pain in any joint. ? · You have joint pain and a fever or rash. ? · You have such bad pain that you cannot use a joint. ? Watch closely for changes in your health, and be sure to contact your doctor if:  ? · You have mild joint symptoms that continue even with more than 6 weeks of care at home. ? · You have stomach pain or other problems with your medicine. Where can you learn more? Go to http://usman-brooklyn.info/. Enter D184 in the search box to learn more about \"Arthritis: Care Instructions. \"  Current as of: October 31, 2016  Content Version: 11.4  © 3498-5453 [employer]. Care instructions adapted under license by [employer] (which disclaims liability or warranty for this information). If you have questions about a medical condition or this instruction, always ask your healthcare professional. [place] [address] any warranty or liability for your use of this information.

## 2018-01-06 LAB
BASOPHILS # BLD AUTO: 0 X10E3/UL (ref 0–0.2)
BASOPHILS NFR BLD AUTO: 0 %
EOSINOPHIL # BLD AUTO: 0.1 X10E3/UL (ref 0–0.4)
EOSINOPHIL NFR BLD AUTO: 2 %
ERYTHROCYTE [DISTWIDTH] IN BLOOD BY AUTOMATED COUNT: 13.1 % (ref 12.3–15.4)
EST. AVERAGE GLUCOSE BLD GHB EST-MCNC: 140 MG/DL
HBA1C MFR BLD: 6.5 % (ref 4.8–5.6)
HCT VFR BLD AUTO: 42.1 % (ref 37.5–51)
HGB BLD-MCNC: 14.6 G/DL (ref 13–17.7)
IMM GRANULOCYTES # BLD: 0 X10E3/UL (ref 0–0.1)
IMM GRANULOCYTES NFR BLD: 1 %
LYMPHOCYTES # BLD AUTO: 1.1 X10E3/UL (ref 0.7–3.1)
LYMPHOCYTES NFR BLD AUTO: 18 %
MCH RBC QN AUTO: 31.7 PG (ref 26.6–33)
MCHC RBC AUTO-ENTMCNC: 34.7 G/DL (ref 31.5–35.7)
MCV RBC AUTO: 92 FL (ref 79–97)
MONOCYTES # BLD AUTO: 0.6 X10E3/UL (ref 0.1–0.9)
MONOCYTES NFR BLD AUTO: 10 %
NEUTROPHILS # BLD AUTO: 4.2 X10E3/UL (ref 1.4–7)
NEUTROPHILS NFR BLD AUTO: 69 %
PLATELET # BLD AUTO: 203 X10E3/UL (ref 150–379)
RBC # BLD AUTO: 4.6 X10E6/UL (ref 4.14–5.8)
WBC # BLD AUTO: 6.1 X10E3/UL (ref 3.4–10.8)

## 2018-01-08 NOTE — PROGRESS NOTES
Please speak with wife regarding results due to memory issues. Diabetes has improved which is excellent and currently at goal so keep up the good work and there is NO anemia.   Recheck in 6 months

## 2018-01-16 RX ORDER — METFORMIN HYDROCHLORIDE 500 MG/1
TABLET, EXTENDED RELEASE ORAL
Qty: 30 TAB | Refills: 1 | Status: SHIPPED | OUTPATIENT
Start: 2018-01-16 | End: 2018-03-26 | Stop reason: SDUPTHER

## 2018-02-26 DIAGNOSIS — I10 ESSENTIAL HYPERTENSION WITH GOAL BLOOD PRESSURE LESS THAN 140/90: Chronic | ICD-10-CM

## 2018-02-26 RX ORDER — LOSARTAN POTASSIUM AND HYDROCHLOROTHIAZIDE 25; 100 MG/1; MG/1
1 TABLET ORAL DAILY
Qty: 90 TAB | Refills: 1 | Status: SHIPPED | OUTPATIENT
Start: 2018-02-26 | End: 2018-06-19 | Stop reason: SDUPTHER

## 2018-03-01 DIAGNOSIS — J44.9 CHRONIC OBSTRUCTIVE PULMONARY DISEASE, UNSPECIFIED COPD TYPE (HCC): Chronic | ICD-10-CM

## 2018-03-01 RX ORDER — BUDESONIDE AND FORMOTEROL FUMARATE DIHYDRATE 160; 4.5 UG/1; UG/1
AEROSOL RESPIRATORY (INHALATION)
Qty: 1 INHALER | Refills: 4 | Status: SHIPPED | OUTPATIENT
Start: 2018-03-01 | End: 2018-11-27 | Stop reason: SDUPTHER

## 2018-03-26 RX ORDER — METFORMIN HYDROCHLORIDE 500 MG/1
TABLET, EXTENDED RELEASE ORAL
Qty: 30 TAB | Refills: 0 | Status: SHIPPED | OUTPATIENT
Start: 2018-03-26 | End: 2018-09-19 | Stop reason: ALTCHOICE

## 2018-05-07 RX ORDER — METFORMIN HYDROCHLORIDE 500 MG/1
TABLET, EXTENDED RELEASE ORAL
Qty: 30 TAB | Refills: 0 | Status: SHIPPED | OUTPATIENT
Start: 2018-05-07 | End: 2018-06-19 | Stop reason: SDUPTHER

## 2018-06-07 RX ORDER — SIMVASTATIN 20 MG/1
20 TABLET, FILM COATED ORAL
Qty: 90 TAB | Refills: 1 | Status: SHIPPED | OUTPATIENT
Start: 2018-06-07 | End: 2018-09-19 | Stop reason: ALTCHOICE

## 2018-06-19 ENCOUNTER — HOSPITAL ENCOUNTER (OUTPATIENT)
Dept: LAB | Age: 73
Discharge: HOME | End: 2018-06-19
Payer: MEDICARE

## 2018-06-19 ENCOUNTER — OFFICE VISIT (OUTPATIENT)
Dept: FAMILY MEDICINE CLINIC | Age: 73
End: 2018-06-19

## 2018-06-19 VITALS
TEMPERATURE: 98.1 F | SYSTOLIC BLOOD PRESSURE: 110 MMHG | BODY MASS INDEX: 28.81 KG/M2 | DIASTOLIC BLOOD PRESSURE: 72 MMHG | HEART RATE: 73 BPM | RESPIRATION RATE: 16 BRPM | OXYGEN SATURATION: 97 % | HEIGHT: 73 IN | WEIGHT: 217.4 LBS

## 2018-06-19 DIAGNOSIS — Z00.00 ROUTINE GENERAL MEDICAL EXAMINATION AT A HEALTH CARE FACILITY: Primary | ICD-10-CM

## 2018-06-19 DIAGNOSIS — E11.65 TYPE 2 DIABETES MELLITUS WITH HYPERGLYCEMIA, WITHOUT LONG-TERM CURRENT USE OF INSULIN (HCC): Chronic | ICD-10-CM

## 2018-06-19 DIAGNOSIS — B37.81 CANDIDAL ESOPHAGITIS (HCC): ICD-10-CM

## 2018-06-19 DIAGNOSIS — Z71.89 ADVANCE CARE PLANNING: ICD-10-CM

## 2018-06-19 DIAGNOSIS — F03.90 DEMENTIA WITHOUT BEHAVIORAL DISTURBANCE, UNSPECIFIED DEMENTIA TYPE: ICD-10-CM

## 2018-06-19 DIAGNOSIS — I10 ESSENTIAL HYPERTENSION WITH GOAL BLOOD PRESSURE LESS THAN 140/90: Chronic | ICD-10-CM

## 2018-06-19 DIAGNOSIS — J44.9 CHRONIC OBSTRUCTIVE PULMONARY DISEASE, UNSPECIFIED COPD TYPE (HCC): Chronic | ICD-10-CM

## 2018-06-19 LAB
LEFT EYE DIABETIC RETINOPATHY: NORMAL
LEFT EYE IMAGE QUALITY: NORMAL
LEFT EYE MACULAR EDEMA: NORMAL
LEFT EYE OTHER RETINOPATHY: NORMAL
RESULT: NORMAL
RIGHT EYE DIABETIC RETINOPATHY: NORMAL
RIGHT EYE IMAGE QUALITY: NORMAL
RIGHT EYE MACULAR EDEMA: NORMAL
RIGHT EYE OTHER RETINOPATHY: NORMAL
SEVERITY: NORMAL

## 2018-06-19 PROCEDURE — 80061 LIPID PANEL: CPT

## 2018-06-19 PROCEDURE — 80053 COMPREHEN METABOLIC PANEL: CPT

## 2018-06-19 PROCEDURE — 83036 HEMOGLOBIN GLYCOSYLATED A1C: CPT

## 2018-06-19 RX ORDER — LOSARTAN POTASSIUM AND HYDROCHLOROTHIAZIDE 25; 100 MG/1; MG/1
1 TABLET ORAL DAILY
Qty: 90 TAB | Refills: 1 | Status: SHIPPED | OUTPATIENT
Start: 2018-06-19 | End: 2019-03-14 | Stop reason: SDUPTHER

## 2018-06-19 RX ORDER — METFORMIN HYDROCHLORIDE 500 MG/1
TABLET, EXTENDED RELEASE ORAL
Qty: 90 TAB | Refills: 3 | Status: SHIPPED | OUTPATIENT
Start: 2018-06-19 | End: 2018-09-19 | Stop reason: ALTCHOICE

## 2018-06-19 RX ORDER — GLIPIZIDE 5 MG/1
TABLET ORAL
Qty: 180 TAB | Refills: 3 | Status: SHIPPED | OUTPATIENT
Start: 2018-06-19 | End: 2019-07-09 | Stop reason: ALTCHOICE

## 2018-06-19 NOTE — MR AVS SNAPSHOT
[address] [address] 
[phone] Patient: Stanley Horton MRN: [id number] :1945 Visit Information Date & Time Provider Department Dept. Phone Encounter #  
 2018  7:30 AM Eliud Pat MD [address] [facility] [phone] [id number] Follow-up Instructions Return in about 3 months (around 2018) for dm. Upcoming Health Maintenance Date Due  
 EYE EXAM RETINAL OR DILATED Q1 1955 DTaP/Tdap/Td series (1 - Tdap) 1966 ZOSTER VACCINE AGE 60> 2005 GLAUCOMA SCREENING Q2Y 2010 Pneumococcal 65+ Low/Medium Risk (1 of 2 - PCV13) 2010 FOBT Q 1 YEAR AGE 50-75 3/27/2017 FOOT EXAM Q1 2017 MEDICARE YEARLY EXAM 3/14/2018 HEMOGLOBIN A1C Q6M 2018 Influenza Age 5 to Adult 2018 MICROALBUMIN Q1 10/5/2018 LIPID PANEL Q1 10/5/2018 Allergies as of 2018  Review Complete On: 2018 By: Eliud Pat MD  
  
 Severity Noted Reaction Type Reactions Pcn [Penicillins] Medium 2012   Systemic Rash  
 Sulfa (Sulfonamide Antibiotics) Medium 2012   Systemic Rash Demerol [Meperidine]  2012   Intolerance Nausea and Vomiting Severe vomiting Current Immunizations  Reviewed on 2016 Name Date Influenza High Dose Vaccine PF 10/5/2017, 2016 Not reviewed this visit You Were Diagnosed With   
  
 Codes Comments Routine general medical examination at a health care facility    -  Primary ICD-10-CM: Z00.00 ICD-9-CM: V70.0 Type 2 diabetes mellitus with hyperglycemia, without long-term current use of insulin (HCC)     ICD-10-CM: E11.65 ICD-9-CM: 250.00, 790.29 Essential hypertension with goal blood pressure less than 140/90     ICD-10-CM: I10 
ICD-9-CM: 401.9 Dementia without behavioral disturbance, unspecified dementia type     ICD-10-CM: F03.90 ICD-9-CM: 294.20 Candidal esophagitis (HCC)     ICD-10-CM: B37.81 ICD-9-CM: 112.84 Chronic obstructive pulmonary disease, unspecified COPD type ([facility]     ICD-10-CM: J44.9 ICD-9-CM: 484 Advance care planning     ICD-10-CM: Z71.89 ICD-9-CM: V65.49 Vitals BP Pulse Temp Resp Height(growth percentile) Weight(growth percentile) 110/72 73 98.1 °F (36.7 °C) (Oral) 16 6' 1\" (1.854 m) 217 lb 6.4 oz (98.6 kg) SpO2 BMI Smoking Status 97% 28.68 kg/m2 Former Smoker Vitals History BMI and BSA Data Body Mass Index Body Surface Area  
 28.68 kg/m 2 2.25 m 2 Preferred Pharmacy Pharmacy Name Phone [facility] [address] [phone] Your Updated Medication List  
  
   
This list is accurate as of 6/19/18  8:17 AM.  Always use your most recent med list.  
  
  
  
  
 albuterol 90 mcg/actuation inhaler Commonly known as:  PROAIR HFA Take 1 Puff by inhalation every four (4) hours as needed. -65 mg Pwpk Generic drug:  Aspirin-Caffeine Take  by mouth three (3) times daily. diclofenac EC 75 mg EC tablet Commonly known as:  VOLTAREN Take 1 Tab by mouth two (2) times daily as needed. Pt to not use BC powder with this  
  
 ferrous sulfate 325 mg (65 mg iron) tablet TAKE ONE TABLET BY MOUTH THREE TIMES A DAY WITH MEALS  
  
 glipiZIDE 5 mg tablet Commonly known as:  GLUCOTROL  
TAKE ONE TABLET BY MOUTH TWICE A DAY  Indications: type 2 diabetes mellitus  
  
 losartan-hydroCHLOROthiazide 100-25 mg per tablet Commonly known as:  HYZAAR Take 1 Tab by mouth daily. Indications: hypertension  
  
 meloxicam 7.5 mg tablet Commonly known as:  MOBIC Take 1 Tab by mouth daily. * metFORMIN  mg tablet Commonly known as:  GLUCOPHAGE XR  
TAKE ONE TABLET BY MOUTH DAILY WITH DINNER  
  
 * metFORMIN  mg tablet Commonly known as:  GLUCOPHAGE XR  
 TAKE ONE TABLET BY MOUTH DAILY WITH DINNER  Indications: type 2 diabetes mellitus  
  
 pantoprazole 40 mg tablet Commonly known as:  PROTONIX  
TAKE ONE TABLET BY MOUTH TWICE A DAY  
  
 simvastatin 20 mg tablet Commonly known as:  ZOCOR Take 1 Tab by mouth nightly. SYMBICORT 160-4.5 mcg/actuation Hfaa Generic drug:  budesonide-formoterol INHALE TWO PUFFS BY MOUTH TWICE A DAY * Notice: This list has 2 medication(s) that are the same as other medications prescribed for you. Read the directions carefully, and ask your doctor or other care provider to review them with you. Prescriptions Sent to Pharmacy Refills  
 metFORMIN ER (GLUCOPHAGE XR) 500 mg tablet 3 Sig: TAKE ONE TABLET BY MOUTH DAILY WITH DINNER  Indications: type 2 diabetes mellitus Class: Normal  
 Pharmacy: [facility], [address] [place], [place] [address] Ph #: [phone]  
 losartan-hydroCHLOROthiazide (HYZAAR) 100-25 mg per tablet 1 Sig: Take 1 Tab by mouth daily. Indications: hypertension Class: Normal  
 Pharmacy: Annemarie Larsen, [address] [place] [address] [facility] Ph #: [phone] Route: Oral  
 glipiZIDE (GLUCOTROL) 5 mg tablet 3 Sig: TAKE ONE TABLET BY MOUTH TWICE A DAY  Indications: type 2 diabetes mellitus Class: Normal  
 Pharmacy: [facility], [address] [place], [place] [address] Ph #: [phone] We Performed the Following FUNDUS PHOTOGRAPHY [id number] CPT(R)] HEMOGLOBIN A1C WITH EAG [27151 CPT(R)] LIPID PANEL [97627 CPT(R)] METABOLIC PANEL, COMPREHENSIVE [57941 CPT(R)] Follow-up Instructions Return in about 3 months (around 9/19/2018) for dm. Introducing [facility]! Wayne Elizondo introduces [employer] patient portal. Now you can access parts of your medical record, email your doctor's office, and request medication refills online. 1. In your internet browser, go to [email or web address] 2. Click on the First Time User? Click Here link in the Sign In box. You will see the New Member Sign Up page. 3. Enter your [employer] Access Code exactly as it appears below. You will not need to use this code after youve completed the sign-up process. If you do not sign up before the expiration date, you must request a new code. · [employer] Access Code: [email or web address] Expires: 9/17/2018  7:41 AM 
 
4. Enter the last four digits of your Social Security Number (xxxx) and Date of Birth (mm/dd/yyyy) as indicated and click Submit. You will be taken to the next sign-up page. 5. Create a [employer] ID. This will be your [employer] login ID and cannot be changed, so think of one that is secure and easy to remember. 6. Create a [employer] password. You can change your password at any time. 7. Enter your Password Reset Question and Answer. This can be used at a later time if you forget your password. 8. Enter your e-mail address. You will receive e-mail notification when new information is available in [address] 9. Click Sign Up. You can now view and download portions of your medical record. 10. Click the Download Summary menu link to download a portable copy of your medical information. If you have questions, please visit the Frequently Asked Questions section of the [employer] website. Remember, [employer] is NOT to be used for urgent needs. For medical emergencies, dial 911. Now available from your iPhone and Android! Please provide this summary of care documentation to your next provider. Your primary care clinician is listed as Jagdish Eagle. If you have any questions after today's visit, please call [phone].

## 2018-06-19 NOTE — PROGRESS NOTES
Chief Complaint   Patient presents with    Diabetes     lost 23 lbs    Hypertension    Memory Loss     Short term-skin tear left forearm    Medication Refill     1. Have you been to the ER, urgent care clinic since your last visit? Hospitalized since your last visit? No    2. Have you seen or consulted any other health care providers outside of the [address] [place] [address] since your last visit? Include any pap smears or colon screening. No       Rui Dominique  6/19/2018  Provider:   Krysten:  Diabetes Report Card   1) Have you seen the eye doctor in past year?no    2) How would you  rate your Diabetic Diet? GOOD   3) How well do you take care of your feet? SO SO   4) Do you keep your Primary Care Follow Up Appts? yes    5) Do you know your A1C goal?no    6) Do you take your medications daily? yes    7) Do you check your blood sugars? yes    8) Have you gained weight?no       9) Do you follow an exercise program?no    10) Can you do better?no      Lab Results   Component Value Date/Time    Cholesterol, total 158 10/05/2017 09:47 AM    HDL Cholesterol 38 (L) 10/05/2017 09:47 AM    LDL, calculated 97 10/05/2017 09:47 AM    Triglyceride 114 10/05/2017 09:47 AM     Lab Results   Component Value Date/Time    Hemoglobin A1c 6.5 (H) 01/05/2018 02:26 PM    Hemoglobin A1c 7.8 (H) 10/05/2017 09:47 AM    Hemoglobin A1c 6.8 (H) 07/05/2017 09:06 AM    Glucose 130 (H) 10/05/2017 09:47 AM    Glucose (POC) 202 (H) 03/30/2016 07:31 AM    Microalb/Creat ratio (ug/mg creat.) Comment (A) 10/05/2017 09:47 AM    LDL, calculated 97 10/05/2017 09:47 AM    Creatinine (POC) 0.9 08/02/2013 11:38 AM    Creatinine 1.05 10/05/2017 09:47 AM        Medicare Wellness Exam:    Chief Complaint   Patient presents with    Diabetes     lost 23 lbs    Hypertension    Memory Loss     Short term-skin tear left forearm    Medication Refill     he is a 67y.o. year old male who presents for evaluation for their Medicare Wellness Visit.     Fall Screen is completed and assessed=yes  Depression Screen is completed and assessed=yes  Medication list reviewed and adjusted for accuracy=yes  Immunizations reviewed and updated=yes  Health/Preventative Screenings reviewed and updated=yes  ADL Functions reviewed=yes    Patient Active Problem List    Diagnosis    Type 2 diabetes mellitus with hyperglycemia, without long-term current use of insulin ([facility]    Osteoarthritis    Advance care planning    Dementia without behavioral disturbance    PUD (peptic ulcer disease)    Iron deficiency anemia    Candidal esophagitis (HCC)    COPD (chronic obstructive pulmonary disease) ([facility])    HTN (hypertension)    Blood loss anemia    GI bleed       Reviewed PmHx, RxHx, FmHx, SocHx, AllgHx and updated and dated in the chart. Review of Systems - negative except as listed above in the HPI    Objective:     Vitals:    06/19/18 0740   BP: 110/72   Pulse: 73   Resp: 16   Temp: 98.1 °F (36.7 °C)   TempSrc: Oral   SpO2: 97%   Weight: 217 lb 6.4 oz (98.6 kg)   Height: 6' 1\" (1.854 m)     Physical Examination: General appearance - alert, well appearing, and in no distress  Neck - supple, no significant adenopathy  Lymphatics - no palpable lymphadenopathy, no hepatosplenomegaly  Chest - clear to auscultation, no wheezes, rales or rhonchi, symmetric air entry  Heart - normal rate, regular rhythm, normal S1, S2, no murmurs, rubs, clicks or gallops  Abdomen - soft, nontender, nondistended, no masses or organomegaly  Extremities - peripheral pulses normal, no pedal edema, no clubbing or cyanosis  Skin - left forearm with healing skin tear    Assessment/ Plan:   Diagnoses and all orders for this visit:    1. Routine general medical examination at a health care facility  -see below    2.  Type 2 diabetes mellitus with hyperglycemia, without long-term current use of insulin ([facility])  -     glipiZIDE (GLUCOTROL) 5 mg tablet; TAKE ONE TABLET BY MOUTH TWICE A DAY  Indications: type 2 diabetes mellitus  -     FUNDUS PHOTOGRAPHY  -     LIPID PANEL  -     METABOLIC PANEL, COMPREHENSIVE  -     HEMOGLOBIN A1C WITH EAG  -at goal  -wt loss with diet changes    3. Essential hypertension with goal blood pressure less than 140/90  -     losartan-hydroCHLOROthiazide (HYZAAR) 100-25 mg per tablet; Take 1 Tab by mouth daily. Indications: hypertension  -     LIPID PANEL  -     METABOLIC PANEL, COMPREHENSIVE  -at goal    4. Dementia without behavioral disturbance, unspecified dementia type  -work up in progress    5. Candidal esophagitis ([facility]  -resolved    6. Chronic obstructive pulmonary disease, unspecified COPD type ([facility]   -stopped smoking  -stable    7. Advance care planning  I discussed with patient living will and gave a legal form for patient to fill out and bring back to the office. Other orders  -     metFORMIN ER (GLUCOPHAGE XR) 500 mg tablet; TAKE ONE TABLET BY MOUTH DAILY WITH DINNER  Indications: type 2 diabetes mellitus         -Pain evaluation performed in office  -Cognitive Screen performed in office-mild  -Depression Screen, Fall risks (by up and go test)  and ADL functionality were addressed  -Medication list updated and reviewed for any changes   -A comprehensive review of medical issues and a plan was formulated  -End of life planning was addressed with pt   -Health Screenings for preventions were addressed and a plan was formulated  -Shingles Vaccine was recommended  -Discussed with patient cancer risk factors and appropriate screenings for age  -Patient evaluated for colonoscopy and referred if needed per screeing criteria  -Labs from previous visits were discussed with patient   -Discussed with patient diet and exercise and formulated a plan as needed  -An Advanced care plan was developed with the patient.  -Alcohol screening performed and was negative    -Follow-up Disposition:  Return in about 3 months (around 9/19/2018) for dm.     I have discussed the diagnosis with the patient and the intended plan as seen in the above orders. The patient understands and agrees with the plan. The patient has received an after-visit summary and questions were answered concerning future plans. Medication Side Effects and Warnings were discussed with patien  Patient Labs were reviewed and or requested  Patient Past Records were reviewed and or requested    There are no Patient Instructions on file for this visit.       Sukhdeep Davison M.D.

## 2018-06-20 LAB
ALBUMIN SERPL-MCNC: 4.3 G/DL (ref 3.5–4.8)
ALBUMIN/GLOB SERPL: 1.7 {RATIO} (ref 1.2–2.2)
ALP SERPL-CCNC: 69 IU/L (ref 39–117)
ALT SERPL-CCNC: 8 IU/L (ref 0–44)
AST SERPL-CCNC: 17 IU/L (ref 0–40)
BILIRUB SERPL-MCNC: 1.2 MG/DL (ref 0–1.2)
BUN SERPL-MCNC: 20 MG/DL (ref 8–27)
BUN/CREAT SERPL: 16 (ref 10–24)
CALCIUM SERPL-MCNC: 9.1 MG/DL (ref 8.6–10.2)
CHLORIDE SERPL-SCNC: 98 MMOL/L (ref 96–106)
CHOLEST SERPL-MCNC: 140 MG/DL (ref 100–199)
CO2 SERPL-SCNC: 22 MMOL/L (ref 20–29)
CREAT SERPL-MCNC: 1.27 MG/DL (ref 0.76–1.27)
EST. AVERAGE GLUCOSE BLD GHB EST-MCNC: 126 MG/DL
GFR SERPLBLD CREATININE-BSD FMLA CKD-EPI: 56 ML/MIN/1.73
GFR SERPLBLD CREATININE-BSD FMLA CKD-EPI: 65 ML/MIN/1.73
GLOBULIN SER CALC-MCNC: 2.6 G/DL (ref 1.5–4.5)
GLUCOSE SERPL-MCNC: 143 MG/DL (ref 65–99)
HBA1C MFR BLD: 6 % (ref 4.8–5.6)
HDLC SERPL-MCNC: 42 MG/DL
INTERPRETATION, 910389: NORMAL
INTERPRETATION: NORMAL
LDLC SERPL CALC-MCNC: 84 MG/DL (ref 0–99)
Lab: NORMAL
PDF IMAGE, 910387: NORMAL
POTASSIUM SERPL-SCNC: 3.8 MMOL/L (ref 3.5–5.2)
PROT SERPL-MCNC: 6.9 G/DL (ref 6–8.5)
SODIUM SERPL-SCNC: 137 MMOL/L (ref 134–144)
TRIGL SERPL-MCNC: 70 MG/DL (ref 0–149)
VLDLC SERPL CALC-MCNC: 14 MG/DL (ref 5–40)

## 2018-06-20 NOTE — PROGRESS NOTES
Spoke to pt to review lab results. Labs look pretty good-no changes at this time. Continue current medications. Pt has hx of dementia. Result letter sent to pts home for family review.

## 2018-07-28 DIAGNOSIS — J44.9 CHRONIC OBSTRUCTIVE PULMONARY DISEASE, UNSPECIFIED COPD TYPE (HCC): Chronic | ICD-10-CM

## 2018-07-30 RX ORDER — ALBUTEROL SULFATE 90 UG/1
AEROSOL, METERED RESPIRATORY (INHALATION)
Qty: 1 INHALER | Refills: 4 | Status: SHIPPED | OUTPATIENT
Start: 2018-07-30 | End: 2019-07-09 | Stop reason: SDUPTHER

## 2018-09-19 ENCOUNTER — HOSPITAL ENCOUNTER (OUTPATIENT)
Dept: LAB | Age: 73
Discharge: HOME | End: 2018-09-19
Payer: MEDICARE

## 2018-09-19 ENCOUNTER — OFFICE VISIT (OUTPATIENT)
Dept: FAMILY MEDICINE CLINIC | Age: 73
End: 2018-09-19

## 2018-09-19 VITALS
TEMPERATURE: 98.1 F | BODY MASS INDEX: 27.43 KG/M2 | HEIGHT: 73 IN | RESPIRATION RATE: 18 BRPM | HEART RATE: 74 BPM | OXYGEN SATURATION: 95 % | DIASTOLIC BLOOD PRESSURE: 62 MMHG | WEIGHT: 207 LBS | SYSTOLIC BLOOD PRESSURE: 116 MMHG

## 2018-09-19 DIAGNOSIS — E11.65 TYPE 2 DIABETES MELLITUS WITH HYPERGLYCEMIA, WITHOUT LONG-TERM CURRENT USE OF INSULIN (HCC): Primary | Chronic | ICD-10-CM

## 2018-09-19 DIAGNOSIS — D50.0 IRON DEFICIENCY ANEMIA DUE TO CHRONIC BLOOD LOSS: Chronic | ICD-10-CM

## 2018-09-19 DIAGNOSIS — I10 ESSENTIAL HYPERTENSION: Chronic | ICD-10-CM

## 2018-09-19 DIAGNOSIS — F03.90 DEMENTIA WITHOUT BEHAVIORAL DISTURBANCE, UNSPECIFIED DEMENTIA TYPE: ICD-10-CM

## 2018-09-19 PROCEDURE — 82043 UR ALBUMIN QUANTITATIVE: CPT

## 2018-09-19 PROCEDURE — 83036 HEMOGLOBIN GLYCOSYLATED A1C: CPT

## 2018-09-19 PROCEDURE — 85025 COMPLETE CBC W/AUTO DIFF WBC: CPT

## 2018-09-19 PROCEDURE — 84443 ASSAY THYROID STIM HORMONE: CPT

## 2018-09-19 PROCEDURE — 80053 COMPREHEN METABOLIC PANEL: CPT

## 2018-09-19 PROCEDURE — 80061 LIPID PANEL: CPT

## 2018-09-19 RX ORDER — DONEPEZIL HYDROCHLORIDE 10 MG/1
10 TABLET, FILM COATED ORAL
Qty: 30 TAB | Refills: 5 | Status: SHIPPED | OUTPATIENT
Start: 2018-09-19 | End: 2019-03-14 | Stop reason: SDUPTHER

## 2018-09-19 NOTE — MR AVS SNAPSHOT
[address] [address] 
[phone] Patient: Byron Espinoza MRN: [id number] :1945 Visit Information Date & Time Provider Department Dept. Phone Encounter #  
 2018  8:30 AM Omid Almodovar MD [address] [facility] [phone] [id number] Follow-up Instructions Return in about 3 months (around 2018) for dm. Upcoming Health Maintenance Date Due DTaP/Tdap/Td series (1 - Tdap) 1966 ZOSTER VACCINE AGE 60> 2005 GLAUCOMA SCREENING Q2Y 2010 Pneumococcal 65+ Low/Medium Risk (1 of 2 - PCV13) 2010 FOBT Q 1 YEAR AGE 50-75 3/27/2017 FOOT EXAM Q1 2017 MICROALBUMIN Q1 10/5/2018 Influenza Age 5 to Adult 2019* HEMOGLOBIN A1C Q6M 2018 EYE EXAM RETINAL OR DILATED Q1 2019 LIPID PANEL Q1 2019 MEDICARE YEARLY EXAM 2019 *Topic was postponed. The date shown is not the original due date. Allergies as of 2018  Review Complete On: 2018 By: Omid Almodovar MD  
  
 Severity Noted Reaction Type Reactions Pcn [Penicillins] Medium 2012   Systemic Rash  
 Sulfa (Sulfonamide Antibiotics) Medium 2012   Systemic Rash Demerol [Meperidine]  2012   Intolerance Nausea and Vomiting Severe vomiting Current Immunizations  Reviewed on 2016 Name Date Influenza High Dose Vaccine PF 10/5/2017, 2016 Not reviewed this visit You Were Diagnosed With   
  
 Codes Comments Type 2 diabetes mellitus with hyperglycemia, without long-term current use of insulin (HCC)    -  Primary ICD-10-CM: E11.65 ICD-9-CM: 250.00, 790.29 Essential hypertension     ICD-10-CM: I10 
ICD-9-CM: 401.9 Iron deficiency anemia due to chronic blood loss     ICD-10-CM: D50.0 ICD-9-CM: 280.0 Dementia without behavioral disturbance, unspecified dementia type     ICD-10-CM: F03.90 ICD-9-CM: 294.20 Vitals BP Pulse Temp Resp Height(growth percentile) Weight(growth percentile) 116/62 74 98.1 °F (36.7 °C) 18 6' 1\" (1.854 m) 207 lb (93.9 kg) SpO2 BMI Smoking Status 95% 27.31 kg/m2 Former Smoker Vitals History BMI and BSA Data Body Mass Index Body Surface Area  
 27.31 kg/m 2 2.2 m 2 Preferred Pharmacy Pharmacy Name Phone [facility] [address], [place] [facility] [address] [phone] Your Updated Medication List  
  
   
This list is accurate as of 9/19/18  9:11 AM.  Always use your most recent med list.  
  
  
  
  
 -65 mg Pwpk Generic drug:  Aspirin-Caffeine Take  by mouth three (3) times daily. diclofenac EC 75 mg EC tablet Commonly known as:  VOLTAREN Take 1 Tab by mouth two (2) times daily as needed. Pt to not use BC powder with this  
  
 donepezil 10 mg tablet Commonly known as:  ARICEPT Take 1 Tab by mouth nightly. ferrous sulfate 325 mg (65 mg iron) tablet TAKE ONE TABLET BY MOUTH THREE TIMES A DAY WITH MEALS  
  
 glipiZIDE 5 mg tablet Commonly known as:  GLUCOTROL  
TAKE ONE TABLET BY MOUTH TWICE A DAY  Indications: type 2 diabetes mellitus  
  
 losartan-hydroCHLOROthiazide 100-25 mg per tablet Commonly known as:  HYZAAR Take 1 Tab by mouth daily. Indications: hypertension  
  
 meloxicam 7.5 mg tablet Commonly known as:  MOBIC Take 1 Tab by mouth daily. pantoprazole 40 mg tablet Commonly known as:  PROTONIX  
TAKE ONE TABLET BY MOUTH TWICE A DAY PROAIR HFA 90 mcg/actuation inhaler Generic drug:  albuterol USE 1 PUFF BY MOUTH EVERY 4 HOURS AS NEEDED  
  
 SYMBICORT 160-4.5 mcg/actuation Hfaa Generic drug:  budesonide-formoterol INHALE TWO PUFFS BY MOUTH TWICE A DAY Prescriptions Sent to Pharmacy Refills  
 donepezil (ARICEPT) 10 mg tablet 5 Sig: Take 1 Tab by mouth nightly.   
 Class: Normal  
 Pharmacy: [facility], [address] [place] [address] #: [phone] Route: Oral  
  
We Performed the Following CBC WITH AUTOMATED DIFF [89154 CPT(R)] HEMOGLOBIN A1C WITH EAG [55408 CPT(R)] LIPID PANEL [25847 CPT(R)] METABOLIC PANEL, COMPREHENSIVE [64852 CPT(R)] MICROALBUMIN, UR, RAND W/ MICROALB/CREAT RATIO [id number] CPT(R)] TSH 3RD GENERATION [28035 CPT(R)] Follow-up Instructions Return in about 3 months (around 12/19/2018) for dm. Introducing [facility]! [facility] introduces [employer] patient portal. Now you can access parts of your medical record, email your doctor's office, and request medication refills online. 1. In your internet browser, go to [email or web address] 2. Click on the First Time User? Click Here link in the Sign In box. You will see the New Member Sign Up page. 3. Enter your [employer] Access Code exactly as it appears below. You will not need to use this code after youve completed the sign-up process. If you do not sign up before the expiration date, you must request a new code. · [employer] Access Code: [email or web address] Expires: 12/18/2018  9:11 AM 
 
4. Enter the last four digits of your Social Security Number (xxxx) and Date of Birth (mm/dd/yyyy) as indicated and click Submit. You will be taken to the next sign-up page. 5. Create a [employer] ID. This will be your [employer] login ID and cannot be changed, so think of one that is secure and easy to remember. 6. Create a [employer] password. You can change your password at any time. 7. Enter your Password Reset Question and Answer. This can be used at a later time if you forget your password. 8. Enter your e-mail address. You will receive e-mail notification when new information is available in [address] 9. Click Sign Up. You can now view and download portions of your medical record.  
10. Click the Download Summary menu link to download a portable copy of your medical information. If you have questions, please visit the Frequently Asked Questions section of the [employer] website. Remember, [employer] is NOT to be used for urgent needs. For medical emergencies, dial 911. Now available from your iPhone and Android! Please provide this summary of care documentation to your next provider. Your primary care clinician is listed as Fátima Saavedra. If you have any questions after today's visit, please call [phone].

## 2018-09-19 NOTE — PROGRESS NOTES
Patient here for dementia. He states family thinks it is getting worse. He feels its getting a little worse. He calls himself names when he can not remember where he puts things. He states its not constant. He can't always remember short term , but he never forgets long term things. He remembers things in the past. Patient also due to 3 month dm labs. Can not remember if he had breakfast or not. 1. Have you been to the ER, urgent care clinic since your last visit? Hospitalized since your last visit? No 
 
2. Have you seen or consulted any other health care providers outside of the [address] [place] [address] since your last visit? Include any pap smears or colon screening. No  
 
 
Maya Esquivel 9/19/2018 Provider:  
Krysten:  Diabetes Report Card 1) Have you seen the eye doctor in past year?yes 2) How would you  rate your Diabetic Diet?good 3) How well do you take care of your feet?good 4) Do you keep your Primary Care Follow Up Appts?no 5) Do you know your A1C goal?no 6) Do you take your medications daily? yes 7) Do you check your blood sugars? yes 8) Have you gained weight?no 9) Do you follow an exercise program?yes 10) Can you do better?yes Lab Results Component Value Date/Time Cholesterol, total 140 06/19/2018 08:15 AM  
 HDL Cholesterol 42 06/19/2018 08:15 AM  
 LDL, calculated 84 06/19/2018 08:15 AM  
 Triglyceride 70 06/19/2018 08:15 AM  
 
Lab Results Component Value Date/Time Hemoglobin A1c 6.0 (H) 06/19/2018 08:15 AM  
 Hemoglobin A1c 6.5 (H) 01/05/2018 02:26 PM  
 Hemoglobin A1c 7.8 (H) 10/05/2017 09:47 AM  
 Glucose 143 (H) 06/19/2018 08:15 AM  
 Glucose (POC) 202 (H) 03/30/2016 07:31 AM  
 Microalb/Creat ratio (ug/mg creat.) Comment (A) 10/05/2017 09:47 AM  
 LDL, calculated 84 06/19/2018 08:15 AM  
 Creatinine (POC) 0.9 08/02/2013 11:38 AM  
 Creatinine 1.27 06/19/2018 08:15 AM  
  
 
Lab Results Component Value Date/Time Microalb/Creat ratio (ug/mg creat.) Comment (A) 10/05/2017 09:47 AM  
  
Chief Complaint Patient presents with  Dementia  
  family thinks dementia is getting worse.  Diabetes 3 month dm, labs , can not remember if he had breakfast or not. he is a 67y.o. year old male who presents for evaluation. See Diabetic Report Card listed above. Patient Active Problem List  
 Diagnosis  Type 2 diabetes mellitus with hyperglycemia, without long-term current use of insulin ([facility]  Osteoarthritis  Advance care planning  Dementia without behavioral disturbance  PUD (peptic ulcer disease)  Iron deficiency anemia  Candidal esophagitis ([facility]  COPD (chronic obstructive pulmonary disease) (HCC)  HTN (hypertension)  Blood loss anemia  GI bleed Reviewed PmHx, RxHx, FmHx, SocHx, AllgHx--dated and updated in the chart. Review of Systems - negative except as listed above in the HPI Objective:  
 
Vitals:  
 09/19/18 0850 BP: 116/62 Pulse: 74 Resp: 18 Temp: 98.1 °F (36.7 °C) SpO2: 95% Weight: 207 lb (93.9 kg) Height: 6' 1\" (1.854 m) Physical Examination: General appearance - alert, well appearing, and in no distress Chest - clear to auscultation, no wheezes, rales or rhonchi, symmetric air entry Heart - normal rate, regular rhythm, normal S1, S2, no murmurs, rubs, clicks or gallops Abdomen - soft, nontender, nondistended, no masses or organomegaly Extremities - peripheral pulses normal, no pedal edema, no clubbing or cyanosis Assessment/ Plan:  
Diagnoses and all orders for this visit: 
 
1. Type 2 diabetes mellitus with hyperglycemia, without long-term current use of insulin ([facility]) -     LIPID PANEL 
-     METABOLIC PANEL, COMPREHENSIVE 
-     CBC WITH AUTOMATED DIFF 
-     TSH 3RD GENERATION 
-     MICROALBUMIN, UR, RAND W/ MICROALB/CREAT RATIO 
-     HEMOGLOBIN A1C WITH EAG 
-at goal 
 
2. Essential hypertension -     LIPID PANEL 
 -     METABOLIC PANEL, COMPREHENSIVE 
-at goal 
 
3. Iron deficiency anemia due to chronic blood loss 
-     CBC WITH AUTOMATED DIFF 4. Dementia without behavioral disturbance, unspecified dementia type 
-will try aricept Follow-up Disposition: 
Return in about 3 months (around 12/19/2018) for dm. Lab Results Component Value Date/Time Cholesterol, total 140 06/19/2018 08:15 AM  
 HDL Cholesterol 42 06/19/2018 08:15 AM  
 LDL, calculated 84 06/19/2018 08:15 AM  
 Triglyceride 70 06/19/2018 08:15 AM  
 
Lab Results Component Value Date/Time Hemoglobin A1c 6.0 (H) 06/19/2018 08:15 AM  
 Hemoglobin A1c 6.5 (H) 01/05/2018 02:26 PM  
 Hemoglobin A1c 7.8 (H) 10/05/2017 09:47 AM  
 Microalb/Creat ratio (ug/mg creat.) Comment (A) 10/05/2017 09:47 AM  
 LDL, calculated 84 06/19/2018 08:15 AM  
 Creatinine (POC) 0.9 08/02/2013 11:38 AM  
 Creatinine 1.27 06/19/2018 08:15 AM  
  
 
 
Discussed with patient goal of Diabetes to include:  HgA1C <7, LDL cholesterol <100, Blood pressure <140/80. Discussed with patient diet and weight management and to get regular exercise. Recommend yearly eye exams and daily foot care. The patient understands and agrees with the plan. I have discussed the diagnosis with the patient and the intended plan as seen in the above orders. The patient has received an after-visit summary and questions were answered concerning future plans. Medication Side Effects and Warnings were discussed with patient Patient Labs were reviewed and or requested Patient Past Records were reviewed and or requested Ray Mixon M.D. [facility] There are no Patient Instructions on file for this visit.

## 2018-09-25 LAB
ALBUMIN SERPL-MCNC: 4.2 G/DL (ref 3.5–4.8)
ALBUMIN/CREAT UR: <3.3 MG/G CREAT (ref 0–30)
ALBUMIN/GLOB SERPL: 1.7 {RATIO} (ref 1.2–2.2)
ALP SERPL-CCNC: 61 IU/L (ref 39–117)
ALT SERPL-CCNC: 14 IU/L (ref 0–44)
AST SERPL-CCNC: 19 IU/L (ref 0–40)
BASOPHILS # BLD AUTO: 0 X10E3/UL (ref 0–0.2)
BASOPHILS NFR BLD AUTO: 0 %
BILIRUB SERPL-MCNC: 1.6 MG/DL (ref 0–1.2)
BUN SERPL-MCNC: 21 MG/DL (ref 8–27)
BUN/CREAT SERPL: 14 (ref 10–24)
CALCIUM SERPL-MCNC: 9.4 MG/DL (ref 8.6–10.2)
CHLORIDE SERPL-SCNC: 95 MMOL/L (ref 96–106)
CHOLEST SERPL-MCNC: 198 MG/DL (ref 100–199)
CO2 SERPL-SCNC: 26 MMOL/L (ref 20–29)
CREAT SERPL-MCNC: 1.45 MG/DL (ref 0.76–1.27)
CREAT UR-MCNC: 91.3 MG/DL
EOSINOPHIL # BLD AUTO: 0.1 X10E3/UL (ref 0–0.4)
EOSINOPHIL NFR BLD AUTO: 1 %
ERYTHROCYTE [DISTWIDTH] IN BLOOD BY AUTOMATED COUNT: 13.2 % (ref 12.3–15.4)
EST. AVERAGE GLUCOSE BLD GHB EST-MCNC: 177 MG/DL
GLOBULIN SER CALC-MCNC: 2.5 G/DL (ref 1.5–4.5)
GLUCOSE SERPL-MCNC: 249 MG/DL (ref 65–99)
HBA1C MFR BLD: 7.8 % (ref 4.8–5.6)
HCT VFR BLD AUTO: 39.6 % (ref 37.5–51)
HDLC SERPL-MCNC: 36 MG/DL
HGB BLD-MCNC: 12.9 G/DL (ref 13–17.7)
IMM GRANULOCYTES # BLD: 0 X10E3/UL (ref 0–0.1)
IMM GRANULOCYTES NFR BLD: 0 %
INTERPRETATION, 910389: NORMAL
INTERPRETATION: NORMAL
LDLC SERPL CALC-MCNC: 131 MG/DL (ref 0–99)
LYMPHOCYTES # BLD AUTO: 0.8 X10E3/UL (ref 0.7–3.1)
LYMPHOCYTES NFR BLD AUTO: 15 %
Lab: NORMAL
MCH RBC QN AUTO: 31.2 PG (ref 26.6–33)
MCHC RBC AUTO-ENTMCNC: 32.6 G/DL (ref 31.5–35.7)
MCV RBC AUTO: 96 FL (ref 79–97)
MICROALBUMIN UR-MCNC: <3 UG/ML
MONOCYTES # BLD AUTO: 0.4 X10E3/UL (ref 0.1–0.9)
MONOCYTES NFR BLD AUTO: 7 %
NEUTROPHILS # BLD AUTO: 4.4 X10E3/UL (ref 1.4–7)
NEUTROPHILS NFR BLD AUTO: 77 %
PDF IMAGE, 910387: NORMAL
PLATELET # BLD AUTO: 234 X10E3/UL (ref 150–379)
POTASSIUM SERPL-SCNC: 3.9 MMOL/L (ref 3.5–5.2)
PROT SERPL-MCNC: 6.7 G/DL (ref 6–8.5)
RBC # BLD AUTO: 4.13 X10E6/UL (ref 4.14–5.8)
SODIUM SERPL-SCNC: 138 MMOL/L (ref 134–144)
TRIGL SERPL-MCNC: 155 MG/DL (ref 0–149)
TSH SERPL DL<=0.005 MIU/L-ACNC: 0.68 UIU/ML (ref 0.45–4.5)
VLDLC SERPL CALC-MCNC: 31 MG/DL (ref 5–40)
WBC # BLD AUTO: 5.8 X10E3/UL (ref 3.4–10.8)

## 2018-09-26 ENCOUNTER — TELEPHONE (OUTPATIENT)
Dept: FAMILY MEDICINE CLINIC | Age: 73
End: 2018-09-26

## 2018-09-26 NOTE — TELEPHONE ENCOUNTER
Called and spoke to pt. Left message w/ pt for wife to call back to discuss pts lab results. Pt's wife handles pts medical care.

## 2018-09-26 NOTE — PROGRESS NOTES
Called and spoke to pts wife, Tari Atkinson (on [employer]IP[employer]). Instructed Mrs. Chávez to have pt restart glipizide and simvastatin. RTO in 1 week to repeat kidney function test. Per wife, pt does not drink any water. Drinks [employer] brand lemon/lime soft drink and eats brownies multiple times a day. Instructed for pt to hydrate by drinking water and avoid NSAIDs. Follow a heart healthy, diabetic diet. Repeat test in 1 week.

## 2018-09-27 ENCOUNTER — TELEPHONE (OUTPATIENT)
Dept: FAMILY MEDICINE CLINIC | Age: 73
End: 2018-09-27

## 2018-09-27 NOTE — TELEPHONE ENCOUNTER
----- Message from Asha Funes NP sent at 9/26/2018  2:53 PM EDT -----  Regarding: Kidney function  Need to repeat kidney function test in 1-2 weeks. Stay hydrated, drink more water, avoid NSAIDs.

## 2018-09-27 NOTE — TELEPHONE ENCOUNTER
Called and spoke with Katherine Bermudez, pts wife. (on [employer]IP[employer]). Pt needs to rto in 1 week to check kidney function.

## 2018-10-26 ENCOUNTER — HOSPITAL ENCOUNTER (OUTPATIENT)
Dept: LAB | Age: 73
Discharge: HOME | End: 2018-10-26
Payer: MEDICARE

## 2018-10-26 ENCOUNTER — OFFICE VISIT (OUTPATIENT)
Dept: FAMILY MEDICINE CLINIC | Age: 73
End: 2018-10-26

## 2018-10-26 VITALS
OXYGEN SATURATION: 98 % | HEIGHT: 73 IN | HEART RATE: 66 BPM | SYSTOLIC BLOOD PRESSURE: 123 MMHG | RESPIRATION RATE: 16 BRPM | BODY MASS INDEX: 27.3 KG/M2 | WEIGHT: 206 LBS | DIASTOLIC BLOOD PRESSURE: 77 MMHG | TEMPERATURE: 98.1 F

## 2018-10-26 DIAGNOSIS — Z23 ENCOUNTER FOR IMMUNIZATION: ICD-10-CM

## 2018-10-26 DIAGNOSIS — R79.89 ELEVATED SERUM CREATININE: Primary | ICD-10-CM

## 2018-10-26 PROBLEM — E11.21 TYPE 2 DIABETES WITH NEPHROPATHY (HCC): Status: ACTIVE | Noted: 2018-10-26

## 2018-10-26 PROCEDURE — 80048 BASIC METABOLIC PNL TOTAL CA: CPT

## 2018-10-26 NOTE — PATIENT INSTRUCTIONS
A Healthy Lifestyle: Care Instructions Your Care Instructions A healthy lifestyle can help you feel good, stay at a healthy weight, and have plenty of energy for both work and play. A healthy lifestyle is something you can share with your whole family. A healthy lifestyle also can lower your risk for serious health problems, such as high blood pressure, heart disease, and diabetes. You can follow a few steps listed below to improve your health and the health of your family. Follow-up care is a key part of your treatment and safety. Be sure to make and go to all appointments, and call your doctor if you are having problems. It's also a good idea to know your test results and keep a list of the medicines you take. How can you care for yourself at home? · Do not eat too much sugar, fat, or fast foods. You can still have dessert and treats now and then. The goal is moderation. · Start small to improve your eating habits. Pay attention to portion sizes, drink less juice and soda pop, and eat more fruits and vegetables. ? Eat a healthy amount of food. A 3-ounce serving of meat, for example, is about the size of a deck of cards. Fill the rest of your plate with vegetables and whole grains. ? Limit the amount of soda and sports drinks you have every day. Drink more water when you are thirsty. ? Eat at least 5 servings of fruits and vegetables every day. It may seem like a lot, but it is not hard to reach this goal. A serving or helping is 1 piece of fruit, 1 cup of vegetables, or 2 cups of leafy, raw vegetables. Have an apple or some carrot sticks as an afternoon snack instead of a candy bar. Try to have fruits and/or vegetables at every meal. 
· Make exercise part of your daily routine. You may want to start with simple activities, such as walking, bicycling, or slow swimming. Try to be active 30 to 60 minutes every day.  You do not need to do all 30 to 60 minutes all at once. For example, you can exercise 3 times a day for 10 or 20 minutes. Moderate exercise is safe for most people, but it is always a good idea to talk to your doctor before starting an exercise program. 
· Keep moving. Inga Crigler the lawn, work in the garden, or [place]. Take the stairs instead of the elevator at work. · If you smoke, quit. People who smoke have an increased risk for heart attack, stroke, cancer, and other lung illnesses. Quitting is hard, but there are ways to boost your chance of quitting tobacco for good. ? Use nicotine gum, patches, or lozenges. ? Ask your doctor about stop-smoking programs and medicines. ? Keep trying. In addition to reducing your risk of diseases in the future, you will notice some benefits soon after you stop using tobacco. If you have shortness of breath or asthma symptoms, they will likely get better within a few weeks after you quit. · Limit how much alcohol you drink. Moderate amounts of alcohol (up to 2 drinks a day for men, 1 drink a day for women) are okay. But drinking too much can lead to liver problems, high blood pressure, and other health problems. Family health If you have a family, there are many things you can do together to improve your health. · Eat meals together as a family as often as possible. · Eat healthy foods. This includes fruits, vegetables, lean meats and dairy, and whole grains. · Include your family in your fitness plan. Most people think of activities such as jogging or tennis as the way to fitness, but there are many ways you and your family can be more active. Anything that makes you breathe hard and gets your heart pumping is exercise. Here are some tips: 
? Walk to do errands or to take your child to school or the bus. 
? Go for a family bike ride after dinner instead of watching TV. Where can you learn more? Go to http://usman-brooklyn.info/. Enter U958 in the search box to learn more about \"A Healthy Lifestyle: Care Instructions. \" Current as of: December 7, 2017 Content Version: 11.8 © 8216-0384 [employer], [employer]. Care instructions adapted under license by [employer] (which disclaims liability or warranty for this information). If you have questions about a medical condition or this instruction, always ask your healthcare professional. [place] [address] any warranty or liability for your use of this information.

## 2018-10-26 NOTE — PROGRESS NOTES
Chief Complaint Patient presents with  Labs  Immunization/Injection  
  flu shot Patient presents in office today for lab recheck kidney fuction and flu shot. No concerns.

## 2018-10-26 NOTE — PROGRESS NOTES
Solis Blakely is a 67 y.o. male Chief Complaint Patient presents with  Labs  Immunization/Injection  
  flu shot Pt here for f/u on his declined renal function. Per wife on phone note pt does not drink well. Last Cr was 1.45. Pt states he is trying to drink more and eat better. Pt states he feels pretty good otherwise. he is a 67y.o. year old male who presents for evalution. Reviewed PmHx, RxHx, FmHx, SocHx, AllgHx and updated and dated in the chart. Review of Systems - negative except as listed above in the HPI Objective:  
 
Vitals:  
 10/26/18 0905 BP: 123/77 Pulse: 66 Resp: 16 Temp: 98.1 °F (36.7 °C) TempSrc: Oral  
SpO2: 98% Weight: 206 lb (93.4 kg) Height: 6' 1\" (1.854 m) Current Outpatient Medications Medication Sig  
 donepezil (ARICEPT) 10 mg tablet Take 1 Tab by mouth nightly.  PROAIR HFA 90 mcg/actuation inhaler USE 1 PUFF BY MOUTH EVERY 4 HOURS AS NEEDED  
 losartan-hydroCHLOROthiazide (HYZAAR) 100-25 mg per tablet Take 1 Tab by mouth daily. Indications: hypertension  glipiZIDE (GLUCOTROL) 5 mg tablet TAKE ONE TABLET BY MOUTH TWICE A DAY  Indications: type 2 diabetes mellitus  SYMBICORT 160-4.5 mcg/actuation HFAA INHALE TWO PUFFS BY MOUTH TWICE A DAY  pantoprazole (PROTONIX) 40 mg tablet TAKE ONE TABLET BY MOUTH TWICE A DAY  Aspirin-Caffeine (BC) 845-65 mg pwpk Take  by mouth three (3) times daily.  meloxicam (MOBIC) 7.5 mg tablet Take 1 Tab by mouth daily.  ferrous sulfate 325 mg (65 mg iron) tablet TAKE ONE TABLET BY MOUTH THREE TIMES A DAY WITH MEALS (Patient taking differently: TAKE ONE TABLET BY MOUTH TWO TIMES A DAY WITH MEALS)  diclofenac EC (VOLTAREN) 75 mg EC tablet Take 1 Tab by mouth two (2) times daily as needed. Pt to not use BC powder with this No current facility-administered medications for this visit. Physical Examination: General appearance - alert, well appearing, and in no distress Chest - clear to auscultation, no wheezes, rales or rhonchi, symmetric air entry Heart - normal rate, regular rhythm, normal S1, S2, no murmurs, rubs, clicks or gallops Assessment/ Plan:  
Diagnoses and all orders for this visit: 1. Elevated serum creatinine -     METABOLIC PANEL, BASIC 2. Encounter for immunization 
-     INFLUENZA VACCINE INACTIVATED (IIV), SUBUNIT, ADJUVANTED, IM 
-     OK IMMUNIZ ADMIN,1 SINGLE/COMB VAC/TOXOID Follow-up Disposition: 
Return if symptoms worsen or fail to improve. I have discussed the diagnosis with the patient and the intended plan as seen in the above orders. The patient has received an after-visit summary and questions were answered concerning future plans. Pt conveyed understanding of plan. Medication Side Effects and Warnings were discussed with patient [facility], DO

## 2018-10-27 LAB
BUN SERPL-MCNC: 15 MG/DL (ref 8–27)
BUN/CREAT SERPL: 13 (ref 10–24)
CALCIUM SERPL-MCNC: 9.4 MG/DL (ref 8.6–10.2)
CHLORIDE SERPL-SCNC: 99 MMOL/L (ref 96–106)
CO2 SERPL-SCNC: 27 MMOL/L (ref 20–29)
CREAT SERPL-MCNC: 1.2 MG/DL (ref 0.76–1.27)
GLUCOSE SERPL-MCNC: 86 MG/DL (ref 65–99)
POTASSIUM SERPL-SCNC: 3.3 MMOL/L (ref 3.5–5.2)
SODIUM SERPL-SCNC: 142 MMOL/L (ref 134–144)

## 2018-11-14 ENCOUNTER — OFFICE VISIT (OUTPATIENT)
Dept: FAMILY MEDICINE CLINIC | Age: 73
End: 2018-11-14

## 2018-11-14 ENCOUNTER — HOSPITAL ENCOUNTER (OUTPATIENT)
Dept: LAB | Age: 73
Discharge: HOME | End: 2018-11-14
Payer: MEDICARE

## 2018-11-14 VITALS
HEIGHT: 73 IN | OXYGEN SATURATION: 99 % | BODY MASS INDEX: 27.3 KG/M2 | DIASTOLIC BLOOD PRESSURE: 73 MMHG | WEIGHT: 206 LBS | HEART RATE: 70 BPM | RESPIRATION RATE: 20 BRPM | TEMPERATURE: 98.4 F | SYSTOLIC BLOOD PRESSURE: 107 MMHG

## 2018-11-14 DIAGNOSIS — I10 ESSENTIAL HYPERTENSION: ICD-10-CM

## 2018-11-14 DIAGNOSIS — D50.0 BLOOD LOSS ANEMIA: Primary | ICD-10-CM

## 2018-11-14 PROCEDURE — 85025 COMPLETE CBC W/AUTO DIFF WBC: CPT

## 2018-11-14 RX ORDER — SIMVASTATIN 20 MG/1
20 TABLET, FILM COATED ORAL
COMMUNITY
End: 2019-07-09 | Stop reason: SDUPTHER

## 2018-11-14 RX ORDER — METFORMIN HYDROCHLORIDE 500 MG/1
500 TABLET ORAL
COMMUNITY
End: 2019-07-09 | Stop reason: ALTCHOICE

## 2018-11-14 NOTE — PROGRESS NOTES
Patient here for weakness and loss of appetite. This happened before, per daughter, when he has a bleeding ulcer. She would like a H &H drawn today. 1. Have you been to the ER, urgent care clinic since your last visit? Hospitalized since your last visit? No    2. Have you seen or consulted any other health care providers outside of the [address] [place] [address] since your last visit? Include any pap smears or colon screening. No       No chief complaint on file. He is a 68 y.o. male who presents for evalution. Reviewed PmHx, RxHx, FmHx, SocHx, AllgHx and updated and dated in the chart. Patient Active Problem List    Diagnosis    Type 2 diabetes with nephropathy (HCC)    Type 2 diabetes mellitus with hyperglycemia, without long-term current use of insulin ([facility]    Osteoarthritis    Advance care planning    Dementia without behavioral disturbance    PUD (peptic ulcer disease)    Iron deficiency anemia    Candidal esophagitis ([facility]    COPD (chronic obstructive pulmonary disease) ([facility]    HTN (hypertension)    Blood loss anemia    GI bleed       Review of Systems - negative except as listed above in the HPI    Objective:     Vitals:    11/14/18 1538   BP: 107/73   Pulse: 70   Resp: 20   Temp: 98.4 °F (36.9 °C)   SpO2: 99%   Weight: 206 lb (93.4 kg)   Height: 6' 1\" (1.854 m)     Physical Examination: General appearance - alert, well appearing, and in no distress  Chest - clear to auscultation, no wheezes, rales or rhonchi, symmetric air entry  Heart - normal rate, regular rhythm, normal S1, S2, no murmurs, rubs, clicks or gallops      Assessment/ Plan:   Diagnoses and all orders for this visit:    1. Blood loss anemia  -     CBC WITH AUTOMATED DIFF  -no sxs    2. Essential hypertension  -at goal       Follow-up Disposition: Not on File    I have discussed the diagnosis with the patient and the intended plan as seen in the above orders. The patient understands and agrees with the plan.  The patient has received an after-visit summary and questions were answered concerning future plans. Medication Side Effects and Warnings were discussed with patient  Patient Labs were reviewed and or requested:  Patient Past Records were reviewed and or requested    Vernell Frank M.D. There are no Patient Instructions on file for this visit.

## 2018-11-15 LAB
BASOPHILS # BLD AUTO: 0 X10E3/UL (ref 0–0.2)
BASOPHILS NFR BLD AUTO: 0 %
EOSINOPHIL # BLD AUTO: 0 X10E3/UL (ref 0–0.4)
EOSINOPHIL NFR BLD AUTO: 0 %
ERYTHROCYTE [DISTWIDTH] IN BLOOD BY AUTOMATED COUNT: 13.9 % (ref 12.3–15.4)
HCT VFR BLD AUTO: 34 % (ref 37.5–51)
HGB BLD-MCNC: 11.6 G/DL (ref 13–17.7)
IMM GRANULOCYTES # BLD: 0 X10E3/UL (ref 0–0.1)
IMM GRANULOCYTES NFR BLD: 0 %
LYMPHOCYTES # BLD AUTO: 1.5 X10E3/UL (ref 0.7–3.1)
LYMPHOCYTES NFR BLD AUTO: 22 %
MCH RBC QN AUTO: 30.4 PG (ref 26.6–33)
MCHC RBC AUTO-ENTMCNC: 34.1 G/DL (ref 31.5–35.7)
MCV RBC AUTO: 89 FL (ref 79–97)
MONOCYTES # BLD AUTO: 0.6 X10E3/UL (ref 0.1–0.9)
MONOCYTES NFR BLD AUTO: 8 %
NEUTROPHILS # BLD AUTO: 4.7 X10E3/UL (ref 1.4–7)
NEUTROPHILS NFR BLD AUTO: 70 %
PLATELET # BLD AUTO: 258 X10E3/UL (ref 150–379)
RBC # BLD AUTO: 3.81 X10E6/UL (ref 4.14–5.8)
WBC # BLD AUTO: 6.8 X10E3/UL (ref 3.4–10.8)

## 2018-11-15 NOTE — PROGRESS NOTES
Called and spoke to patient and wife. Informed of lab results and recommend per Dr. Nancy Baca. Appt made for 1 month f/u. Both patient and wife verbally agree and voice understanding.

## 2018-11-19 RX ORDER — SIMVASTATIN 20 MG/1
TABLET, FILM COATED ORAL
Qty: 90 TAB | Refills: 1 | Status: SHIPPED | OUTPATIENT
Start: 2018-11-19 | End: 2019-07-09 | Stop reason: SDUPTHER

## 2018-11-27 ENCOUNTER — TELEPHONE (OUTPATIENT)
Dept: FAMILY MEDICINE CLINIC | Age: 73
End: 2018-11-27

## 2018-11-27 DIAGNOSIS — J44.9 CHRONIC OBSTRUCTIVE PULMONARY DISEASE, UNSPECIFIED COPD TYPE (HCC): Chronic | ICD-10-CM

## 2018-11-27 RX ORDER — BUDESONIDE AND FORMOTEROL FUMARATE DIHYDRATE 160; 4.5 UG/1; UG/1
AEROSOL RESPIRATORY (INHALATION)
Qty: 1 INHALER | Refills: 3 | Status: SHIPPED | OUTPATIENT
Start: 2018-11-27 | End: 2021-01-01 | Stop reason: SDUPTHER

## 2018-12-06 ENCOUNTER — OFFICE VISIT (OUTPATIENT)
Dept: FAMILY MEDICINE CLINIC | Age: 73
End: 2018-12-06

## 2018-12-06 ENCOUNTER — HOSPITAL ENCOUNTER (OUTPATIENT)
Dept: LAB | Age: 73
Discharge: HOME | End: 2018-12-06
Payer: MEDICARE

## 2018-12-06 VITALS
DIASTOLIC BLOOD PRESSURE: 70 MMHG | RESPIRATION RATE: 20 BRPM | SYSTOLIC BLOOD PRESSURE: 128 MMHG | OXYGEN SATURATION: 100 % | TEMPERATURE: 98.3 F | HEIGHT: 73 IN | WEIGHT: 202.13 LBS | HEART RATE: 77 BPM | BODY MASS INDEX: 26.79 KG/M2

## 2018-12-06 DIAGNOSIS — D50.0 IRON DEFICIENCY ANEMIA DUE TO CHRONIC BLOOD LOSS: Chronic | ICD-10-CM

## 2018-12-06 DIAGNOSIS — I10 ESSENTIAL HYPERTENSION: Chronic | ICD-10-CM

## 2018-12-06 DIAGNOSIS — E11.65 TYPE 2 DIABETES MELLITUS WITH HYPERGLYCEMIA, WITHOUT LONG-TERM CURRENT USE OF INSULIN (HCC): Primary | Chronic | ICD-10-CM

## 2018-12-06 PROCEDURE — 80053 COMPREHEN METABOLIC PANEL: CPT

## 2018-12-06 PROCEDURE — 80061 LIPID PANEL: CPT

## 2018-12-06 PROCEDURE — 85025 COMPLETE CBC W/AUTO DIFF WBC: CPT

## 2018-12-06 PROCEDURE — 83036 HEMOGLOBIN GLYCOSYLATED A1C: CPT

## 2018-12-06 PROCEDURE — 83550 IRON BINDING TEST: CPT

## 2018-12-06 NOTE — PROGRESS NOTES
1. Have you been to the ER, urgent care clinic since your last visit? Hospitalized since your last visit? No 
 
2. Have you seen or consulted any other health care providers outside of the [address] [place] [address] since your last visit? Include any pap smears or colon screening. No  
Chief Complaint Patient presents with  Labs  
  labs-  
 Medication Refill  
   wants stronger than dicoflenac Lab Results Component Value Date/Time Microalb/Creat ratio (ug/mg creat.) <3.3 09/19/2018 09:11 AM  
  
Chief Complaint Patient presents with  Labs  
  labs-  
 Medication Refill  
   wants stronger than dicoflenac   
 
he is a 68y.o. year old male who presents for evaluation. See Diabetic Report Card listed above. Patient Active Problem List  
 Diagnosis  Type 2 diabetes with nephropathy ([facility]  Type 2 diabetes mellitus with hyperglycemia, without long-term current use of insulin ([facility] [address] [facility]  Osteoarthritis  Advance care planning  Dementia without behavioral disturbance  PUD (peptic ulcer disease)  Iron deficiency anemia  Candidal esophagitis ([facility] [address] [facility]  COPD (chronic obstructive pulmonary disease) (HCC)  HTN (hypertension)  Blood loss anemia  GI bleed Reviewed PmHx, RxHx, FmHx, SocHx, AllgHx--dated and updated in the chart. Review of Systems - negative except as listed above in the HPI Objective:  
 
Vitals:  
 12/06/18 0945 BP: 128/70 Pulse: 77 Resp: 20 Temp: 98.3 °F (36.8 °C) TempSrc: Oral  
SpO2: 100% Weight: 202 lb 2 oz (91.7 kg) Height: 6' 1\" (1.854 m) Physical Examination: General appearance - alert, well appearing, and in no distress Neck - supple, no significant adenopathy Chest - clear to auscultation, no wheezes, rales or rhonchi, symmetric air entry Heart - normal rate, regular rhythm, normal S1, S2, no murmurs, rubs, clicks or gallops Assessment/ Plan:  
Diagnoses and all orders for this visit: 
 
 1. Type 2 diabetes mellitus with hyperglycemia, without long-term current use of insulin ([facility] [address] -     LIPID PANEL 
-     METABOLIC PANEL, COMPREHENSIVE 
-     HEMOGLOBIN A1C WITH EAG 
-Patient is enrolled in our [employer] Diabetes plan. Patient has agreed to participate and understands expectations and goals  Our brochure, along with the listed current labs and standards of care, was given to the patient. The patient has met with [facility] ([facility]) and will be contacting the patient outside of their appointments to further improved their care. 
-poor diet 
-hold chol rx due to inc JPs 2. Essential hypertension -     LIPID PANEL 
-     METABOLIC PANEL, COMPREHENSIVE 
-at goal 
 
3. Iron deficiency anemia due to chronic blood loss 
-     CBC WITH AUTOMATED DIFF 
-     IRON PROFILE Follow-up Disposition: 
Return in about 3 months (around 3/6/2019) for dm. Lab Results Component Value Date/Time Cholesterol, total 198 09/19/2018 09:11 AM  
 HDL Cholesterol 36 (L) 09/19/2018 09:11 AM  
 LDL, calculated 131 (H) 09/19/2018 09:11 AM  
 Triglyceride 155 (H) 09/19/2018 09:11 AM  
 
Lab Results Component Value Date/Time Hemoglobin A1c 7.8 (H) 09/19/2018 09:11 AM  
 Hemoglobin A1c 6.0 (H) 06/19/2018 08:15 AM  
 Hemoglobin A1c 6.5 (H) 01/05/2018 02:26 PM  
 Microalb/Creat ratio (ug/mg creat.) <3.3 09/19/2018 09:11 AM  
 LDL, calculated 131 (H) 09/19/2018 09:11 AM  
 Creatinine (POC) 0.9 08/02/2013 11:38 AM  
 Creatinine 1.20 10/26/2018 09:21 AM  
  
 
 
Discussed with patient goal of Diabetes to include:  HgA1C <7, LDL cholesterol <100, Blood pressure <140/80. Discussed with patient diet and weight management and to get regular exercise. Recommend yearly eye exams and daily foot care. The patient understands and agrees with the plan. I have discussed the diagnosis with the patient and the intended plan as seen in the above orders.   The patient has received an after-visit summary and questions were answered concerning future plans. Medication Side Effects and Warnings were discussed with patient Patient Labs were reviewed and or requested Patient Past Records were reviewed and or requested Niharika Veloz M.D. [facility] There are no Patient Instructions on file for this visit.

## 2018-12-07 LAB
ALBUMIN SERPL-MCNC: 4.3 G/DL (ref 3.5–4.8)
ALBUMIN/GLOB SERPL: 1.8 {RATIO} (ref 1.2–2.2)
ALP SERPL-CCNC: 55 IU/L (ref 39–117)
ALT SERPL-CCNC: 10 IU/L (ref 0–44)
AST SERPL-CCNC: 16 IU/L (ref 0–40)
BASOPHILS # BLD AUTO: 0 X10E3/UL (ref 0–0.2)
BASOPHILS NFR BLD AUTO: 0 %
BILIRUB SERPL-MCNC: 1.3 MG/DL (ref 0–1.2)
BUN SERPL-MCNC: 18 MG/DL (ref 8–27)
BUN/CREAT SERPL: 15 (ref 10–24)
CALCIUM SERPL-MCNC: 9.2 MG/DL (ref 8.6–10.2)
CHLORIDE SERPL-SCNC: 98 MMOL/L (ref 96–106)
CHOLEST SERPL-MCNC: 172 MG/DL (ref 100–199)
CO2 SERPL-SCNC: 26 MMOL/L (ref 20–29)
CREAT SERPL-MCNC: 1.24 MG/DL (ref 0.76–1.27)
EOSINOPHIL # BLD AUTO: 0 X10E3/UL (ref 0–0.4)
EOSINOPHIL NFR BLD AUTO: 0 %
ERYTHROCYTE [DISTWIDTH] IN BLOOD BY AUTOMATED COUNT: 13 % (ref 12.3–15.4)
EST. AVERAGE GLUCOSE BLD GHB EST-MCNC: 126 MG/DL
GLOBULIN SER CALC-MCNC: 2.4 G/DL (ref 1.5–4.5)
GLUCOSE SERPL-MCNC: 106 MG/DL (ref 65–99)
HBA1C MFR BLD: 6 % (ref 4.8–5.6)
HCT VFR BLD AUTO: 33.6 % (ref 37.5–51)
HDLC SERPL-MCNC: 43 MG/DL
HGB BLD-MCNC: 11.2 G/DL (ref 13–17.7)
IMM GRANULOCYTES # BLD: 0 X10E3/UL (ref 0–0.1)
IMM GRANULOCYTES NFR BLD: 0 %
INTERPRETATION, 910389: NORMAL
INTERPRETATION: NORMAL
IRON SATN MFR SERPL: 7 % (ref 15–55)
IRON SERPL-MCNC: 28 UG/DL (ref 38–169)
LDLC SERPL CALC-MCNC: 109 MG/DL (ref 0–99)
LYMPHOCYTES # BLD AUTO: 0.9 X10E3/UL (ref 0.7–3.1)
LYMPHOCYTES NFR BLD AUTO: 14 %
Lab: NORMAL
MCH RBC QN AUTO: 30 PG (ref 26.6–33)
MCHC RBC AUTO-ENTMCNC: 33.3 G/DL (ref 31.5–35.7)
MCV RBC AUTO: 90 FL (ref 79–97)
MONOCYTES # BLD AUTO: 0.6 X10E3/UL (ref 0.1–0.9)
MONOCYTES NFR BLD AUTO: 9 %
NEUTROPHILS # BLD AUTO: 5.2 X10E3/UL (ref 1.4–7)
NEUTROPHILS NFR BLD AUTO: 77 %
PDF IMAGE, 910387: NORMAL
PLATELET # BLD AUTO: 287 X10E3/UL (ref 150–379)
POTASSIUM SERPL-SCNC: 3.3 MMOL/L (ref 3.5–5.2)
PROT SERPL-MCNC: 6.7 G/DL (ref 6–8.5)
RBC # BLD AUTO: 3.73 X10E6/UL (ref 4.14–5.8)
SODIUM SERPL-SCNC: 137 MMOL/L (ref 134–144)
TIBC SERPL-MCNC: 421 UG/DL (ref 250–450)
TRIGL SERPL-MCNC: 99 MG/DL (ref 0–149)
UIBC SERPL-MCNC: 393 UG/DL (ref 111–343)
VLDLC SERPL CALC-MCNC: 20 MG/DL (ref 5–40)
WBC # BLD AUTO: 6.8 X10E3/UL (ref 3.4–10.8)

## 2018-12-11 ENCOUNTER — TELEPHONE (OUTPATIENT)
Dept: FAMILY MEDICINE CLINIC | Age: 73
End: 2018-12-11

## 2018-12-11 DIAGNOSIS — D50.0 IRON DEFICIENCY ANEMIA DUE TO CHRONIC BLOOD LOSS: Chronic | ICD-10-CM

## 2018-12-11 RX ORDER — LANOLIN ALCOHOL/MO/W.PET/CERES
CREAM (GRAM) TOPICAL
Qty: 90 TAB | Refills: 4 | Status: SHIPPED | OUTPATIENT
Start: 2018-12-11 | End: 2022-01-01

## 2018-12-11 NOTE — PROGRESS NOTES
Spoke to pts wife, Thao Aguilera (on [employer]IP[employer]) and daughter Eh Houston (on [employer]IP[employer]). Per pts wife, pt is not taking iron prescription. She is requesting new rx be sent to pharmacy. Reviewed all labs. Continue with current medications and start iron.

## 2018-12-14 DIAGNOSIS — J44.9 CHRONIC OBSTRUCTIVE PULMONARY DISEASE, UNSPECIFIED COPD TYPE (HCC): Chronic | ICD-10-CM

## 2018-12-14 RX ORDER — BUDESONIDE AND FORMOTEROL FUMARATE DIHYDRATE 160; 4.5 UG/1; UG/1
AEROSOL RESPIRATORY (INHALATION)
Qty: 1 INHALER | Refills: 3 | Status: SHIPPED | OUTPATIENT
Start: 2018-12-14 | End: 2019-07-09 | Stop reason: SDUPTHER

## 2019-02-14 ENCOUNTER — TELEPHONE (OUTPATIENT)
Dept: FAMILY MEDICINE CLINIC | Age: 74
End: 2019-02-14

## 2019-02-14 NOTE — TELEPHONE ENCOUNTER
Spoke with pts wife, Sallee Hodgkins (on [employer]IP[employer]). Per pts wife, pt is taking all medications. He is able to come in for an appointment on March 6 at 9 AM-appointment made.

## 2019-03-06 ENCOUNTER — OFFICE VISIT (OUTPATIENT)
Dept: FAMILY MEDICINE CLINIC | Age: 74
End: 2019-03-06

## 2019-03-06 ENCOUNTER — HOSPITAL ENCOUNTER (OUTPATIENT)
Dept: LAB | Age: 74
Discharge: HOME | End: 2019-03-06
Payer: MEDICARE

## 2019-03-06 VITALS
HEIGHT: 73 IN | OXYGEN SATURATION: 98 % | HEART RATE: 60 BPM | WEIGHT: 198.5 LBS | DIASTOLIC BLOOD PRESSURE: 68 MMHG | TEMPERATURE: 98 F | RESPIRATION RATE: 17 BRPM | BODY MASS INDEX: 26.31 KG/M2 | SYSTOLIC BLOOD PRESSURE: 126 MMHG

## 2019-03-06 DIAGNOSIS — D50.0 IRON DEFICIENCY ANEMIA DUE TO CHRONIC BLOOD LOSS: ICD-10-CM

## 2019-03-06 DIAGNOSIS — F03.90 DEMENTIA WITHOUT BEHAVIORAL DISTURBANCE, UNSPECIFIED DEMENTIA TYPE: ICD-10-CM

## 2019-03-06 DIAGNOSIS — E78.00 HIGH CHOLESTEROL: ICD-10-CM

## 2019-03-06 DIAGNOSIS — J44.9 CHRONIC OBSTRUCTIVE PULMONARY DISEASE, UNSPECIFIED COPD TYPE (HCC): ICD-10-CM

## 2019-03-06 DIAGNOSIS — I10 ESSENTIAL HYPERTENSION: ICD-10-CM

## 2019-03-06 DIAGNOSIS — E11.65 TYPE 2 DIABETES MELLITUS WITH HYPERGLYCEMIA, WITHOUT LONG-TERM CURRENT USE OF INSULIN (HCC): Primary | ICD-10-CM

## 2019-03-06 PROCEDURE — 80061 LIPID PANEL: CPT

## 2019-03-06 PROCEDURE — 80053 COMPREHEN METABOLIC PANEL: CPT

## 2019-03-06 PROCEDURE — 85025 COMPLETE CBC W/AUTO DIFF WBC: CPT

## 2019-03-06 PROCEDURE — 83036 HEMOGLOBIN GLYCOSYLATED A1C: CPT

## 2019-03-06 NOTE — PATIENT INSTRUCTIONS
Iron Deficiency Anemia: Care Instructions  Your Care Instructions    Anemia means that you do not have enough red blood cells. Red blood cells carry oxygen around your body. When you have anemia, it can make you pale, weak, and tired. Many things can cause anemia. The most common cause is loss of blood. This can happen if you have heavy menstrual periods. It can also happen if you have bleeding in your stomach or bowel. You can also get anemia if you don't have enough iron in your diet or if it's hard for your body to absorb iron. In some cases, pregnancy causes anemia. That's because a pregnant woman needs more iron. Your doctor may do more tests to find the cause of your anemia. If a disease or other health problem is causing it, your doctor will treat that problem. It's important to follow up with your doctor to make sure that your iron level returns to normal.  Follow-up care is a key part of your treatment and safety. Be sure to make and go to all appointments, and call your doctor if you are having problems. It's also a good idea to know your test results and keep a list of the medicines you take. How can you care for yourself at home? · If your doctor recommended iron pills, take them as directed. ? Try to take the pills on an empty stomach. You can do this about 1 hour before or 2 hours after meals. But you may need to take iron with food to avoid an upset stomach. ? Do not take antacids or drink milk or anything with caffeine within 2 hours of when you take your iron. They can keep your body from absorbing the iron well. ? Vitamin C helps your body absorb iron. You may want to take iron pills with a glass of orange juice or some other food high in vitamin C.  ? Iron pills may cause stomach problems. These include heartburn, nausea, diarrhea, constipation, and cramps. It can help to drink plenty of fluids and include fruits, vegetables, and fiber in your diet.   ? It's normal for iron pills to make your stool a greenish or grayish black. But internal bleeding can also cause dark stool. So it's important to tell your doctor about any color changes. ? Call your doctor if you think you are having a problem with your iron pills. Even after you start to feel better, it will take several months for your body to build up its supply of iron. ? If you miss a pill, don't take a double dose. ? Keep iron pills out of the reach of small children. Too much iron can be very dangerous. · Eat foods with a lot of iron. These include red meat, shellfish, poultry, and eggs. They also include beans, raisins, whole-grain bread, and leafy green vegetables. · Steam your vegetables. This is the best way to prepare them if you want to get as much iron as possible. · Be safe with medicines. Do not take nonsteroidal anti-inflammatory pain relievers unless your doctor tells you to. These include aspirin, naproxen (Aleve), and ibuprofen (Advil, Motrin). · Liquid iron can stain your teeth. But you can mix it with water or juice and drink it with a straw. Then it won't get on your teeth. When should you call for help? Call 911 anytime you think you may need emergency care. For example, call if:    · You passed out (lost consciousness).    Call your doctor now or seek immediate medical care if:    · You are short of breath.     · You are dizzy or light-headed, or you feel like you may faint.     · You have new or worse bleeding.    Watch closely for changes in your health, and be sure to contact your doctor if:    · You feel weaker or more tired than usual.     · You do not get better as expected. Where can you learn more? Go to http://usman-brooklyn.info/. Enter L280 in the search box to learn more about \"Iron Deficiency Anemia: Care Instructions. \"  Current as of: May 6, 2018  Content Version: 11.9  © 6385-1481 [employer].  Care instructions adapted under license by [employer] (which disclaims liability or warranty for this information). If you have questions about a medical condition or this instruction, always ask your healthcare professional. [address] any warranty or liability for your use of this information.

## 2019-03-06 NOTE — PROGRESS NOTES
Chief Complaint   Patient presents with    Diabetes    Labs     Fasting today    Hypertension     He is a 68 y.o. male who presents for evalution. Pt presents for diabetes f/u and labs. Pt is not a reliable historian and presents alone. Pt is compliant with pills-wife fills pill box each week which helps him to remember. He tests blood sugars daily in AM--always under 100. He has increased his fruit and vegetable intake and is eating a little bit more but endorses weight loss. Denies chest pain, sob, headaches, dizziness, numbness/tingling in extremities. Reviewed PmHx, RxHx, FmHx, SocHx, AllgHx and updated and dated in the chart. Patient Active Problem List    Diagnosis    Type 2 diabetes with nephropathy (HCC)    Type 2 diabetes mellitus with hyperglycemia, without long-term current use of insulin ([facility] [address]    Osteoarthritis    Advance care planning    Dementia without behavioral disturbance    PUD (peptic ulcer disease)    Iron deficiency anemia    Candidal esophagitis ([facility] [address]    COPD (chronic obstructive pulmonary disease) ([facility] [address]    HTN (hypertension)    Blood loss anemia    GI bleed       Review of Systems - negative except as listed above in the HPI    Objective:     Vitals:    03/06/19 0958   BP: 126/68   Pulse: 60   Resp: 17   Temp: 98 °F (36.7 °C)   TempSrc: Oral   SpO2: 98%   Weight: 198 lb 8 oz (90 kg)   Height: 6' 1\" (1.854 m)     Physical Examination: General appearance - alert, well appearing, and in no distress and normal appearing weight  Eyes - pupils equal and reactive, extraocular eye movements intact  Neck - supple, no significant adenopathy, thyroid exam: thyroid is normal in size without nodules or tenderness  Lymphatics - no palpable lymphadenopathy  Chest - clear to auscultation, no wheezes, rales or rhonchi, symmetric air entry  Extremities - no pedal edema noted, intact peripheral pulses    Assessment/ Plan:   Diagnoses and all orders for this visit:    1.  Type 2 diabetes mellitus with hyperglycemia, without long-term current use of insulin (HCC)  -     METABOLIC PANEL, COMPREHENSIVE  -     HEMOGLOBIN A1C WITH EAG  Discussed with patient goal of Diabetes to include: HgA1C <7, LDL cholesterol <100, Blood pressure <140/80. Discussed with patient diet and weight management and to get regular exercise. Recommend yearly eye exams and daily foot care. The patient understands and agrees with the plan. -continue current medications   -at goal   2. Essential hypertension  -     METABOLIC PANEL, COMPREHENSIVE  -     LIPID PANEL  -at goal   3. Iron deficiency anemia due to chronic blood loss  -     CBC WITH AUTOMATED DIFF  -pt on iron pills   4. Dementia without behavioral disturbance, unspecified dementia type  -     LIPID PANEL  -stable; continue Aricept   5. Chronic obstructive pulmonary disease, unspecified COPD type (HCC)  -     METABOLIC PANEL, COMPREHENSIVE  -stable  6. High cholesterol  -     LIPID PANEL  -close to goal   -continue statin, lifestyle changes     Follow-up Disposition:  Return in about 3 months (around 6/6/2019), or if symptoms worsen or fail to improve. I have discussed the diagnosis with the patient and the intended plan as seen in the above orders. The patient understands and agrees with the plan. The patient has received an after-visit summary and questions were answered concerning future plans. Medication Side Effects and Warnings were discussed with patient: yes  Patient Labs were reviewed and or requested: yes  Patient Past Records were reviewed and or requested: yes    GOMEZ Soriano, CDE     Patient Instructions          Iron Deficiency Anemia: Care Instructions  Your Care Instructions    Anemia means that you do not have enough red blood cells. Red blood cells carry oxygen around your body. When you have anemia, it can make you pale, weak, and tired. Many things can cause anemia. The most common cause is loss of blood.  This can happen if you have heavy menstrual periods. It can also happen if you have bleeding in your stomach or bowel. You can also get anemia if you don't have enough iron in your diet or if it's hard for your body to absorb iron. In some cases, pregnancy causes anemia. That's because a pregnant woman needs more iron. Your doctor may do more tests to find the cause of your anemia. If a disease or other health problem is causing it, your doctor will treat that problem. It's important to follow up with your doctor to make sure that your iron level returns to normal.  Follow-up care is a key part of your treatment and safety. Be sure to make and go to all appointments, and call your doctor if you are having problems. It's also a good idea to know your test results and keep a list of the medicines you take. How can you care for yourself at home? · If your doctor recommended iron pills, take them as directed. ? Try to take the pills on an empty stomach. You can do this about 1 hour before or 2 hours after meals. But you may need to take iron with food to avoid an upset stomach. ? Do not take antacids or drink milk or anything with caffeine within 2 hours of when you take your iron. They can keep your body from absorbing the iron well. ? Vitamin C helps your body absorb iron. You may want to take iron pills with a glass of orange juice or some other food high in vitamin C.  ? Iron pills may cause stomach problems. These include heartburn, nausea, diarrhea, constipation, and cramps. It can help to drink plenty of fluids and include fruits, vegetables, and fiber in your diet. ? It's normal for iron pills to make your stool a greenish or grayish black. But internal bleeding can also cause dark stool. So it's important to tell your doctor about any color changes. ? Call your doctor if you think you are having a problem with your iron pills.  Even after you start to feel better, it will take several months for your body to build up its supply of iron. ? If you miss a pill, don't take a double dose. ? Keep iron pills out of the reach of small children. Too much iron can be very dangerous. · Eat foods with a lot of iron. These include red meat, shellfish, poultry, and eggs. They also include beans, raisins, whole-grain bread, and leafy green vegetables. · Steam your vegetables. This is the best way to prepare them if you want to get as much iron as possible. · Be safe with medicines. Do not take nonsteroidal anti-inflammatory pain relievers unless your doctor tells you to. These include aspirin, naproxen (Aleve), and ibuprofen (Advil, Motrin). · Liquid iron can stain your teeth. But you can mix it with water or juice and drink it with a straw. Then it won't get on your teeth. When should you call for help? Call 911 anytime you think you may need emergency care. For example, call if:    · You passed out (lost consciousness).    Call your doctor now or seek immediate medical care if:    · You are short of breath.     · You are dizzy or light-headed, or you feel like you may faint.     · You have new or worse bleeding.    Watch closely for changes in your health, and be sure to contact your doctor if:    · You feel weaker or more tired than usual.     · You do not get better as expected. Where can you learn more? Go to http://usman-brooklyn.info/. Enter W235 in the search box to learn more about \"Iron Deficiency Anemia: Care Instructions. \"  Current as of: May 6, 2018  Content Version: 11.9  © 7529-3477 [employer]. Care instructions adapted under license by [employer] (which disclaims liability or warranty for this information). If you have questions about a medical condition or this instruction, always ask your healthcare professional. [address] any warranty or liability for your use of this information.

## 2019-03-06 NOTE — PROGRESS NOTES
Chief Complaint   Patient presents with    Diabetes    Labs     Fasting today    Hypertension     1. Have you been to the ER, urgent care clinic since your last visit? Hospitalized since your last visit? No    2. Have you seen or consulted any other health care providers outside of the [address] [place] [address] since your last visit? Include any pap smears or colon screening.  No

## 2019-03-07 LAB
ALBUMIN SERPL-MCNC: 4.3 G/DL (ref 3.5–4.8)
ALBUMIN/GLOB SERPL: 1.8 {RATIO} (ref 1.2–2.2)
ALP SERPL-CCNC: 57 IU/L (ref 39–117)
ALT SERPL-CCNC: 15 IU/L (ref 0–44)
AST SERPL-CCNC: 17 IU/L (ref 0–40)
BASOPHILS # BLD AUTO: 0 X10E3/UL (ref 0–0.2)
BASOPHILS NFR BLD AUTO: 0 %
BILIRUB SERPL-MCNC: 0.6 MG/DL (ref 0–1.2)
BUN SERPL-MCNC: 14 MG/DL (ref 8–27)
BUN/CREAT SERPL: 13 (ref 10–24)
CALCIUM SERPL-MCNC: 8.9 MG/DL (ref 8.6–10.2)
CHLORIDE SERPL-SCNC: 103 MMOL/L (ref 96–106)
CHOLEST SERPL-MCNC: 139 MG/DL (ref 100–199)
CO2 SERPL-SCNC: 25 MMOL/L (ref 20–29)
CREAT SERPL-MCNC: 1.11 MG/DL (ref 0.76–1.27)
EOSINOPHIL # BLD AUTO: 0.1 X10E3/UL (ref 0–0.4)
EOSINOPHIL NFR BLD AUTO: 1 %
ERYTHROCYTE [DISTWIDTH] IN BLOOD BY AUTOMATED COUNT: 14.9 % (ref 12.3–15.4)
EST. AVERAGE GLUCOSE BLD GHB EST-MCNC: 131 MG/DL
GLOBULIN SER CALC-MCNC: 2.4 G/DL (ref 1.5–4.5)
GLUCOSE SERPL-MCNC: 156 MG/DL (ref 65–99)
HBA1C MFR BLD: 6.2 % (ref 4.8–5.6)
HCT VFR BLD AUTO: 38.2 % (ref 37.5–51)
HDLC SERPL-MCNC: 52 MG/DL
HGB BLD-MCNC: 13.1 G/DL (ref 13–17.7)
IMM GRANULOCYTES # BLD AUTO: 0 X10E3/UL (ref 0–0.1)
IMM GRANULOCYTES NFR BLD AUTO: 1 %
INTERPRETATION, 910389: NORMAL
LDLC SERPL CALC-MCNC: 76 MG/DL (ref 0–99)
LYMPHOCYTES # BLD AUTO: 0.9 X10E3/UL (ref 0.7–3.1)
LYMPHOCYTES NFR BLD AUTO: 18 %
Lab: NORMAL
MCH RBC QN AUTO: 31.3 PG (ref 26.6–33)
MCHC RBC AUTO-ENTMCNC: 34.3 G/DL (ref 31.5–35.7)
MCV RBC AUTO: 91 FL (ref 79–97)
MONOCYTES # BLD AUTO: 0.4 X10E3/UL (ref 0.1–0.9)
MONOCYTES NFR BLD AUTO: 8 %
NEUTROPHILS # BLD AUTO: 3.6 X10E3/UL (ref 1.4–7)
NEUTROPHILS NFR BLD AUTO: 72 %
PLATELET # BLD AUTO: 215 X10E3/UL (ref 150–379)
POTASSIUM SERPL-SCNC: 4 MMOL/L (ref 3.5–5.2)
PROT SERPL-MCNC: 6.7 G/DL (ref 6–8.5)
RBC # BLD AUTO: 4.19 X10E6/UL (ref 4.14–5.8)
SODIUM SERPL-SCNC: 144 MMOL/L (ref 134–144)
TRIGL SERPL-MCNC: 53 MG/DL (ref 0–149)
VLDLC SERPL CALC-MCNC: 11 MG/DL (ref 5–40)
WBC # BLD AUTO: 4.9 X10E3/UL (ref 3.4–10.8)

## 2019-03-07 NOTE — PROGRESS NOTES
Please send lab result letter to pts home address. Labs are stable. Hemoglobin has improved. Continue with current plan of care.

## 2019-03-14 DIAGNOSIS — I10 ESSENTIAL HYPERTENSION WITH GOAL BLOOD PRESSURE LESS THAN 140/90: Chronic | ICD-10-CM

## 2019-03-14 DIAGNOSIS — F03.90 DEMENTIA WITHOUT BEHAVIORAL DISTURBANCE, UNSPECIFIED DEMENTIA TYPE: ICD-10-CM

## 2019-03-14 RX ORDER — LOSARTAN POTASSIUM AND HYDROCHLOROTHIAZIDE 25; 100 MG/1; MG/1
TABLET ORAL
Qty: 90 TAB | Refills: 0 | Status: SHIPPED | OUTPATIENT
Start: 2019-03-14 | End: 2019-07-09 | Stop reason: SDUPTHER

## 2019-03-14 RX ORDER — DONEPEZIL HYDROCHLORIDE 10 MG/1
TABLET, FILM COATED ORAL
Qty: 30 TAB | Refills: 4 | Status: SHIPPED | OUTPATIENT
Start: 2019-03-14 | End: 2019-07-09 | Stop reason: ALTCHOICE

## 2019-03-26 ENCOUNTER — HOSPITAL ENCOUNTER (EMERGENCY)
Age: 74
Discharge: HOME | End: 2019-03-26
Attending: EMERGENCY MEDICINE
Payer: MEDICARE

## 2019-03-26 VITALS
HEART RATE: 89 BPM | SYSTOLIC BLOOD PRESSURE: 129 MMHG | HEIGHT: 71 IN | TEMPERATURE: 99.4 F | OXYGEN SATURATION: 98 % | WEIGHT: 189 LBS | BODY MASS INDEX: 26.46 KG/M2 | RESPIRATION RATE: 15 BRPM | DIASTOLIC BLOOD PRESSURE: 63 MMHG

## 2019-03-26 DIAGNOSIS — E86.0 DEHYDRATION: ICD-10-CM

## 2019-03-26 DIAGNOSIS — R73.9 HYPERGLYCEMIA: Primary | ICD-10-CM

## 2019-03-26 LAB
ALBUMIN SERPL-MCNC: 3.4 G/DL (ref 3.5–5)
ALBUMIN/GLOB SERPL: 1 {RATIO} (ref 1.1–2.2)
ALP SERPL-CCNC: 63 U/L (ref 45–117)
ALT SERPL-CCNC: 15 U/L (ref 12–78)
ANION GAP SERPL CALC-SCNC: 10 MMOL/L (ref 5–15)
APPEARANCE UR: CLEAR
AST SERPL-CCNC: 13 U/L (ref 15–37)
ATRIAL RATE: 90 BPM
BACTERIA URNS QL MICRO: NEGATIVE /HPF
BASOPHILS # BLD: 0 K/UL (ref 0–0.1)
BASOPHILS NFR BLD: 0 % (ref 0–1)
BILIRUB SERPL-MCNC: 1.3 MG/DL (ref 0.2–1)
BILIRUB UR QL: NEGATIVE
BUN SERPL-MCNC: 23 MG/DL (ref 6–20)
BUN/CREAT SERPL: 17 (ref 12–20)
CALCIUM SERPL-MCNC: 8.5 MG/DL (ref 8.5–10.1)
CALCULATED P AXIS, ECG09: 13 DEGREES
CALCULATED R AXIS, ECG10: -47 DEGREES
CALCULATED T AXIS, ECG11: 70 DEGREES
CHLORIDE SERPL-SCNC: 100 MMOL/L (ref 97–108)
CO2 SERPL-SCNC: 24 MMOL/L (ref 21–32)
COLOR UR: ABNORMAL
COMMENT, HOLDF: NORMAL
CREAT SERPL-MCNC: 1.38 MG/DL (ref 0.7–1.3)
DIAGNOSIS, 93000: NORMAL
DIFFERENTIAL METHOD BLD: ABNORMAL
EOSINOPHIL # BLD: 0 K/UL (ref 0–0.4)
EOSINOPHIL NFR BLD: 0 % (ref 0–7)
EPITH CASTS URNS QL MICRO: ABNORMAL /LPF
ERYTHROCYTE [DISTWIDTH] IN BLOOD BY AUTOMATED COUNT: 12.8 % (ref 11.5–14.5)
GLOBULIN SER CALC-MCNC: 3.4 G/DL (ref 2–4)
GLUCOSE BLD STRIP.AUTO-MCNC: 54 MG/DL (ref 65–100)
GLUCOSE BLD STRIP.AUTO-MCNC: 87 MG/DL (ref 65–100)
GLUCOSE SERPL-MCNC: 260 MG/DL (ref 65–100)
GLUCOSE UR STRIP.AUTO-MCNC: >1000 MG/DL
HCT VFR BLD AUTO: 38.1 % (ref 36.6–50.3)
HGB BLD-MCNC: 13.4 G/DL (ref 12.1–17)
HGB UR QL STRIP: ABNORMAL
HYALINE CASTS URNS QL MICRO: ABNORMAL /LPF (ref 0–5)
IMM GRANULOCYTES # BLD AUTO: 0.1 K/UL (ref 0–0.04)
IMM GRANULOCYTES NFR BLD AUTO: 1 % (ref 0–0.5)
KETONES UR QL STRIP.AUTO: ABNORMAL MG/DL
LACTATE BLD-SCNC: 2.14 MMOL/L (ref 0.4–2)
LEUKOCYTE ESTERASE UR QL STRIP.AUTO: NEGATIVE
LYMPHOCYTES # BLD: 0.4 K/UL (ref 0.8–3.5)
LYMPHOCYTES NFR BLD: 5 % (ref 12–49)
MAGNESIUM SERPL-MCNC: 2.1 MG/DL (ref 1.6–2.4)
MCH RBC QN AUTO: 32.3 PG (ref 26–34)
MCHC RBC AUTO-ENTMCNC: 35.2 G/DL (ref 30–36.5)
MCV RBC AUTO: 91.8 FL (ref 80–99)
MONOCYTES # BLD: 0.4 K/UL (ref 0–1)
MONOCYTES NFR BLD: 6 % (ref 5–13)
NEUTS SEG # BLD: 6.5 K/UL (ref 1.8–8)
NEUTS SEG NFR BLD: 88 % (ref 32–75)
NITRITE UR QL STRIP.AUTO: NEGATIVE
NRBC # BLD: 0 K/UL (ref 0–0.01)
NRBC BLD-RTO: 0 PER 100 WBC
P-R INTERVAL, ECG05: 130 MS
PH UR STRIP: 5.5 [PH] (ref 5–8)
PLATELET # BLD AUTO: 232 K/UL (ref 150–400)
PMV BLD AUTO: 8.8 FL (ref 8.9–12.9)
POTASSIUM SERPL-SCNC: 3.2 MMOL/L (ref 3.5–5.1)
PROT SERPL-MCNC: 6.8 G/DL (ref 6.4–8.2)
PROT UR STRIP-MCNC: NEGATIVE MG/DL
Q-T INTERVAL, ECG07: 368 MS
QRS DURATION, ECG06: 132 MS
QTC CALCULATION (BEZET), ECG08: 450 MS
RBC # BLD AUTO: 4.15 M/UL (ref 4.1–5.7)
RBC #/AREA URNS HPF: ABNORMAL /HPF (ref 0–5)
RBC MORPH BLD: ABNORMAL
SAMPLES BEING HELD,HOLD: NORMAL
SERVICE CMNT-IMP: ABNORMAL
SERVICE CMNT-IMP: NORMAL
SODIUM SERPL-SCNC: 134 MMOL/L (ref 136–145)
SP GR UR REFRACTOMETRY: 1.02 (ref 1–1.03)
TROPONIN I SERPL-MCNC: <0.05 NG/ML
UA: UC IF INDICATED,UAUC: ABNORMAL
UROBILINOGEN UR QL STRIP.AUTO: 0.2 EU/DL (ref 0.2–1)
VENTRICULAR RATE, ECG03: 90 BPM
WBC # BLD AUTO: 7.4 K/UL (ref 4.1–11.1)
WBC URNS QL MICRO: ABNORMAL /HPF (ref 0–4)

## 2019-03-26 PROCEDURE — 82962 GLUCOSE BLOOD TEST: CPT

## 2019-03-26 PROCEDURE — 93005 ELECTROCARDIOGRAM TRACING: CPT

## 2019-03-26 PROCEDURE — 96374 THER/PROPH/DIAG INJ IV PUSH: CPT

## 2019-03-26 PROCEDURE — 85025 COMPLETE CBC W/AUTO DIFF WBC: CPT

## 2019-03-26 PROCEDURE — 84484 ASSAY OF TROPONIN QUANT: CPT

## 2019-03-26 PROCEDURE — 96361 HYDRATE IV INFUSION ADD-ON: CPT

## 2019-03-26 PROCEDURE — 83605 ASSAY OF LACTIC ACID: CPT

## 2019-03-26 PROCEDURE — 74011636637 HC RX REV CODE- 636/637: Performed by: EMERGENCY MEDICINE

## 2019-03-26 PROCEDURE — 99285 EMERGENCY DEPT VISIT HI MDM: CPT

## 2019-03-26 PROCEDURE — 74011250636 HC RX REV CODE- 250/636: Performed by: EMERGENCY MEDICINE

## 2019-03-26 PROCEDURE — 80053 COMPREHEN METABOLIC PANEL: CPT

## 2019-03-26 PROCEDURE — 83735 ASSAY OF MAGNESIUM: CPT

## 2019-03-26 PROCEDURE — 36415 COLL VENOUS BLD VENIPUNCTURE: CPT

## 2019-03-26 PROCEDURE — 81001 URINALYSIS AUTO W/SCOPE: CPT

## 2019-03-26 RX ADMIN — SODIUM CHLORIDE 1000 ML: 900 INJECTION, SOLUTION INTRAVENOUS at 11:00

## 2019-03-26 RX ADMIN — INSULIN HUMAN 10 UNITS: 100 INJECTION, SOLUTION PARENTERAL at 11:08

## 2019-03-26 RX ADMIN — SODIUM CHLORIDE 1000 ML: 900 INJECTION, SOLUTION INTRAVENOUS at 09:51

## 2019-03-26 NOTE — PROGRESS NOTES
3/26/2019 
11:17 AM 
Date of previous inpatient admission/ ED visit? N/A What brought the patient back to ED? Hyperglycemia Did patient decline recommended services during last admission/ ED visit (if yes, what)? N/A Has patient seen a provider since their last inpatient admission/ED visit (if yes, when)? PCP 3/6/19 [id number] CM Interventions: 
From previous inpatient admission/ED visit:  N/A From current inpatient admission/ED visit:   CM notified [facility] of ED visit via In Basket message to schedule pt for f/u. CM provided wife w/ [facility] information. Leeanne Goldberg

## 2019-03-26 NOTE — DISCHARGE INSTRUCTIONS
Patient Education        Dehydration: Care Instructions  Your Care Instructions  Dehydration happens when your body loses too much fluid. This might happen when you do not drink enough water or you lose large amounts of fluids from your body because of diarrhea, vomiting, or sweating. Severe dehydration can be life-threatening. Water and minerals called electrolytes help put your body fluids back in balance. Learn the early signs of fluid loss, and drink more fluids to prevent dehydration. Follow-up care is a key part of your treatment and safety. Be sure to make and go to all appointments, and call your doctor if you are having problems. It's also a good idea to know your test results and keep a list of the medicines you take. How can you care for yourself at home? · To prevent dehydration, drink plenty of fluids, enough so that your urine is light yellow or clear like water. Choose water and other caffeine-free clear liquids until you feel better. If you have kidney, heart, or liver disease and have to limit fluids, talk with your doctor before you increase the amount of fluids you drink. · If you do not feel like eating or drinking, try taking small sips of water, sports drinks, or other rehydration drinks. · Get plenty of rest.  To prevent dehydration  · Add more fluids to your diet and daily routine, unless your doctor has told you not to. · During hot weather, drink more fluids. Drink even more fluids if you exercise a lot. Stay away from drinks with alcohol or caffeine. · Watch for the symptoms of dehydration. These include:  ? A dry, sticky mouth. ? Dark yellow urine, and not much of it. ? Dry and sunken eyes. ? Feeling very tired. · Learn what problems can lead to dehydration. These include:  ? Diarrhea, fever, and vomiting. ? Any illness with a fever, such as pneumonia or the flu. ?  Activities that cause heavy sweating, such as endurance races and heavy outdoor work in hot or humid weather. ? Alcohol or drug abuse or withdrawal.  ? Certain medicines, such as cold and allergy pills (antihistamines), diet pills (diuretics), and laxatives. ? Certain diseases, such as diabetes, cancer, and heart or kidney disease. When should you call for help? Call 911 anytime you think you may need emergency care. For example, call if:    · You passed out (lost consciousness).    Call your doctor now or seek immediate medical care if:    · You are confused and cannot think clearly.     · You are dizzy or lightheaded, or you feel like you may faint.     · You have signs of needing more fluids. You have sunken eyes and a dry mouth, and you pass only a little dark urine.     · You cannot keep fluids down.    Watch closely for changes in your health, and be sure to contact your doctor if:    · You are not making tears.     · Your skin is very dry and sags slowly back into place after you pinch it.     · Your mouth and eyes are very dry. Where can you learn more? Go to http://usman-brooklyn.info/. Enter K386 in the search box to learn more about \"Dehydration: Care Instructions. \"  Current as of: September 23, 2018  Content Version: 11.9  © 2585-8679 [employer]. Care instructions adapted under license by [employer] (which disclaims liability or warranty for this information). If you have questions about a medical condition or this instruction, always ask your healthcare professional. [place] [address] any warranty or liability for your use of this information. Patient Education        Learning About High Blood Sugar  What is high blood sugar? Your body turns the food you eat into glucose (sugar), which it uses for energy. But if your body isn't able to use the sugar right away, it can build up in your blood and lead to high blood sugar. When the amount of sugar in your blood stays too high for too much of the time, you may have diabetes. Diabetes is a disease that can cause serious health problems. The good news is that lifestyle changes may help you get your blood sugar back to normal and avoid or delay diabetes. What causes high blood sugar? Sugar (glucose) can build up in your blood if you:  · Are overweight. · Have a family history of diabetes. · Take certain medicines, such as steroids. What are the symptoms? Having high blood sugar may not cause any symptoms at all. Or it may make you feel very thirsty or very hungry. You may also urinate more often than usual, have blurry vision, or lose weight without trying. How is high blood sugar treated? You can take steps to lower your blood sugar level if you understand what makes it get higher. Your doctor may want you to learn how to test your blood sugar level at home. Then you can see how illness, stress, or different kinds of food or medicine raise or lower your blood sugar level. Other tests may be needed to see if you have diabetes. How can you prevent high blood sugar? · Watch your weight. If you're overweight, losing just a small amount of weight may help. Reducing fat around your waist is most important. · Limit the amount of calories, sweets, and unhealthy fat you eat. Ask your doctor if a dietitian can help you. A registered dietitian can help you create meal plans that fit your lifestyle. · Get at least 30 minutes of exercise on most days of the week. Exercise helps control your blood sugar. It also helps you maintain a healthy weight. Walking is a good choice. You also may want to do other activities, such as running, swimming, cycling, or playing tennis or team sports. · If your doctor prescribed medicines, take them exactly as prescribed. Call your doctor if you think you are having a problem with your medicine. You will get more details on the specific medicines your doctor prescribes. Follow-up care is a key part of your treatment and safety.  Be sure to make and go to all appointments, and call your doctor if you are having problems. It's also a good idea to know your test results and keep a list of the medicines you take. Where can you learn more? Go to http://usman-brooklyn.info/. Enter O108 in the search box to learn more about \"Learning About High Blood Sugar. \"  Current as of: July 25, 2018  Content Version: 11.9  © 6721-4718 [employer]. Care instructions adapted under license by [employer] (which disclaims liability or warranty for this information). If you have questions about a medical condition or this instruction, always ask your healthcare professional. [address] any warranty or liability for your use of this information.

## 2019-03-26 NOTE — ED TRIAGE NOTES
Pt arrives EMS from home. Pt felt weak and dizzy. Wife gave him food, toast and honey. Took blood sugar at home and it read 355, EMS repeated and it was 324. Received 500ml of fluid with EMS. Pt was negative on stroke scale. Pt reported to EMS episodes of impending doom. Pt reported weakness and dizzy spells when standing.

## 2019-03-26 NOTE — ED PROVIDER NOTES
68 y.o. male with past medical history significant for DM, COPD, high cholesterol, endocrine disease, osteoarthritis, and skin cancer who presents from home via EMS with chief complaint of weakness. Per daughter, the pt came downstairs this morning and was weak, \"shaking,\" and his lip was \"quivering\" Pt had a BS of 355 at home and 325 per EMS en route. EMS administered 500 mL of IVF en route. Per daughter, the pt has a h/o type 2 DM as well as dementia and does not eat well. She believes that he has not taken his medications in a couple of days. Pt states that his \"breathing has gotten nervous\" and attributes his sx to his nerves. Pt reports some confusion and states that he is \"not sure what (he) is doing. \" No h/o these sx. Pt denies vomiting or fever. There are no other acute medical concerns at this time. Full history, physical exam, and ROS unable to be obtained due to:  dementia. Social hx: former tobacco smoker (quit 39 years ago); no EtOH use PCP: Ebb Goltz, NP Note written by Chelsi Peres, as dictated by Yessenia Krause MD 9:25 AM 
 
The history is provided by the patient. No  was used. Past Medical History:  
Diagnosis Date  COPD (chronic obstructive pulmonary disease) (HCC)  Diabetes ([facility] [address]  Endocrine disease  Essential hypertension  High cholesterol  Osteoarthritis  Skin cancer  Skin disorder  Sun-damaged skin  Sunburn, blistering Past Surgical History:  
Procedure Laterality Date  HX CERVICAL LAMINECTOMY  HX CHOLECYSTECTOMY  HX LUMBAR LAMINECTOMY  HX ORTHOPAEDIC Bilateral   
 hands and shoulders  HX TONSILLECTOMY Family History:  
Problem Relation Age of Onset  Alzheimer Mother Social History Socioeconomic History  Marital status:  Spouse name: Not on file  Number of children: Not on file  Years of education: Not on file  Highest education level: Not on file Occupational History  Not on file Social Needs  Financial resource strain: Not on file  Food insecurity:  
  Worry: Not on file Inability: Not on file  Transportation needs:  
  Medical: Not on file Non-medical: Not on file Tobacco Use  Smoking status: Former Smoker Last attempt to quit: 1980 Years since quittin.3  Smokeless tobacco: Current User Types: Snuff Substance and Sexual Activity  Alcohol use: No  
  Comment: none in 6 years  Drug use: No  
 Sexual activity: Yes  
  Partners: Female Lifestyle  Physical activity:  
  Days per week: Not on file Minutes per session: Not on file  Stress: Not on file Relationships  Social connections:  
  Talks on phone: Not on file Gets together: Not on file Attends Congregational service: Not on file Active member of club or organization: Not on file Attends meetings of clubs or organizations: Not on file Relationship status: Not on file  Intimate partner violence:  
  Fear of current or ex partner: Not on file Emotionally abused: Not on file Physically abused: Not on file Forced sexual activity: Not on file Other Topics Concern  Not on file Social History Narrative  Not on file ALLERGIES: Pcn [penicillins]; Sulfa (sulfonamide antibiotics); and Demerol [meperidine] Review of Systems Unable to perform ROS: Dementia Vitals:  
 19 1015 19 1030 19 1045 19 1100 BP: 117/63 122/60 114/50 106/66 Pulse: 90 91 98 89 Resp: 18  21 Temp:      
SpO2: 97% 98% 98% 98% Weight:      
Height:      
      
 
Physical Exam  
Constitutional: He appears well-developed and well-nourished. No distress. HENT:  
Head: Normocephalic. Mouth/Throat: Mucous membranes are dry (slightly). Eyes: Pupils are equal, round, and reactive to light. Neck: Normal range of motion. Cardiovascular: Normal rate and regular rhythm. No murmur heard. Pulmonary/Chest: Effort normal and breath sounds normal. No respiratory distress. Abdominal: Soft. There is no tenderness. Musculoskeletal: Normal range of motion. He exhibits no edema. Neurological: He is alert. He has normal strength. No cranial nerve deficit. Skin: Skin is warm and dry. Psychiatric: He has a normal mood and affect. His behavior is normal.  
Nursing note and vitals reviewed. Note written by Chelsi Taylor, as dictated by Barney Cortes MD 9:25 AM  
 
MDM Procedures ED EKG interpretation: 
Rhythm: normal sinus rhythm; and regular . Rate (approx.): 90; ST/T wave: no acute ST changes; nonspecific intraventricular block. Note written by Chelsi Taylor, as dictated by Barney Cortes MD 9:31 AM 
 
11:20 AM 
Patient's results have been reviewed with them. Patient and/or family have verbally conveyed their understanding and agreement of the patient's signs, symptoms, diagnosis, treatment and prognosis and additionally agree to follow up as recommended or return to the Emergency Room should their condition change prior to follow-up. Discharge instructions have also been provided to the patient with some educational information regarding their diagnosis as well a list of reasons why they would want to return to the ER prior to their follow-up appointment should their condition change.

## 2019-03-27 ENCOUNTER — PATIENT OUTREACH (OUTPATIENT)
Dept: FAMILY MEDICINE CLINIC | Age: 74
End: 2019-03-27

## 2019-03-27 NOTE — PROGRESS NOTES
ED Discharge Follow-Up Date/Time:     3/27/2019 3:54 PM 
 
Patient presented to [facility]  ED on 3/26/19 and was diagnosed with hyperglycemia and dehydration. Top Challenges reviewed with the provider Patient seen at [facility] ED 3/26 for hyperglycemia - see ED notes. Also seen at [facility] ED on 3/27. Wife reports that the patient is having \"spells\" that are very scary - altered breathing and LOC. She is concerned about depression and anxiety. Reports all testing done at [facility] was \"normal\" (will print records). Reports patient had psych eval.   
    
 
Method of communication with provider :chart routing Nurse Navigator(NN) contacted the  family  by telephone to perform post ED discharge assessment. Verified name and  with family as identifiers. Provided introduction to self, and explanation of the Nurse Navigator role. Medication(s):  
New Medications at Discharge: none Changed Medications at Discharge: none Discontinued Medications at Discharge: none There were no barriers to obtaining medications identified at this time. Reviewed discharge instructions and red flags with  family who voiced understanding. Family given an opportunity to ask questions and does not have any further questions or concerns at this time. The family agrees to contact the PCP office for questions related to their healthcare. Patient reminded that there are physicians on call 24 hours a day / 7 days a week (M- 5pm to 8am and from Friday 5pm until Monday 8am for the weekend) should the patient have questions or concerns. NN provided contact information for future reference. Offered follow up appointment with PCP: yes [facility] follow up appointment(s):  
Future Appointments Date Time Provider Griselda Crabtree 2019 11:00 AM DO ANNA Luna  
2019  9:00 AM Martin Monroe  [facility] follow up appointment(s): none Ortho-tag:  information provided as a resource  
 [email or web address]. [employer] 9 AM- 9 PM.   Phone [phone] Goals  Attends follow-up appointments as directed. 3/27/19 - Scheduled follow-up appointment for 4/1/19 with Dr. Sharri Gu. Patient and wife will attend appointment to discuss concerns.  IVIS

## 2019-04-01 ENCOUNTER — OFFICE VISIT (OUTPATIENT)
Dept: FAMILY MEDICINE CLINIC | Age: 74
End: 2019-04-01

## 2019-04-01 VITALS
TEMPERATURE: 98.3 F | HEART RATE: 73 BPM | OXYGEN SATURATION: 98 % | WEIGHT: 191 LBS | BODY MASS INDEX: 26.64 KG/M2 | SYSTOLIC BLOOD PRESSURE: 118 MMHG | RESPIRATION RATE: 16 BRPM | DIASTOLIC BLOOD PRESSURE: 65 MMHG

## 2019-04-01 DIAGNOSIS — E11.65 TYPE 2 DIABETES MELLITUS WITH HYPERGLYCEMIA, WITHOUT LONG-TERM CURRENT USE OF INSULIN (HCC): Primary | ICD-10-CM

## 2019-04-01 DIAGNOSIS — E11.65 TYPE 2 DIABETES MELLITUS WITH HYPERGLYCEMIA, WITHOUT LONG-TERM CURRENT USE OF INSULIN (HCC): ICD-10-CM

## 2019-04-01 DIAGNOSIS — F32.0 CURRENT MILD EPISODE OF MAJOR DEPRESSIVE DISORDER WITHOUT PRIOR EPISODE (HCC): ICD-10-CM

## 2019-04-01 DIAGNOSIS — E86.0 DEHYDRATION: Primary | ICD-10-CM

## 2019-04-01 LAB — GLUCOSE POC: 69 MG/DL

## 2019-04-01 RX ORDER — LANCETS
EACH MISCELLANEOUS
Qty: 1 EACH | Refills: 11 | Status: SHIPPED | OUTPATIENT
Start: 2019-04-01 | End: 2021-01-01 | Stop reason: SDUPTHER

## 2019-04-01 RX ORDER — SERTRALINE HYDROCHLORIDE 50 MG/1
50 TABLET, FILM COATED ORAL DAILY
Qty: 30 TAB | Refills: 2 | Status: SHIPPED | OUTPATIENT
Start: 2019-04-01 | End: 2019-07-09 | Stop reason: SDUPTHER

## 2019-04-01 NOTE — PROGRESS NOTES
Luis Felipe Heredia is a 68 y.o. male    Chief Complaint   [facility] presents with   [facility]     [facility] 3/[facility]19 high blood sugar and weakness; [facility] 3/27/19 for dementia     Visit Vitals  /65 (BP 1 Location: Left arm, BP Patient Position: Sitting)   Pulse 73   Temp 98.3 °F (36.8 °C) (Oral)   Resp 16   Ht (P) 5' 11\" (1.803 m)   Wt 191 lb (86.6 kg)   SpO2 98%   BMI (P) 26.64 kg/m²       1. Have you been to the ER, urgent care clinic since your last visit? Hospitalized since your last visit? See chief complaint. 2. Have you seen or consulted any other health care providers outside of the [address] [place] [address] since your last visit? Include any pap smears or colon screening.  No

## 2019-04-01 NOTE — PATIENT INSTRUCTIONS
Dementia: Care Instructions  Your Care Instructions    Dementia is a loss of mental skills that affects your daily life. It is different than the occasional trouble with memory that is part of aging. You may find it hard to remember things that you feel you should be able to remember. Or you may feel that your mind is just not working as well as usual.  Finding out that you have dementia is a shock. You may be afraid and worried about how the condition will change your life. Although there is no cure at this time, medicine may slow memory loss and improve thinking for a while. Other medicines may be able to help you sleep or cope with depression and behavior changes. Dementia often gets worse slowly. But it can get worse quickly. As dementia gets worse, it may become harder to do common things that take planning, like making a list and going shopping. Over time, the disease may make it hard for you to take care of yourself. Some people with dementia need others to help care for them. Dementia is different for everyone. You may be able to function well for a long time. In the early stage of the condition, you can do things at home to make life easier and safer. You also can keep doing your hobbies and other activities. Many people find comfort in planning now for their future needs. Follow-up care is a key part of your treatment and safety. Be sure to make and go to all appointments, and call your doctor if you are having problems. It's also a good idea to know your test results and keep a list of the medicines you take. How can you care for yourself at home? · Take your medicines exactly as prescribed. Call your doctor if you think you are having a problem with your medicine. · Eat healthy foods. Eat lots of whole grains, fruits, and vegetables every day.  If you are not hungry, try snacks or nutritional drinks such as Boost, Ensure, or Sustacal.  · If you have problems sleeping:  ? Try not to nap too close to your bedtime. ? Exercise regularly. Walking is a good choice. ? Try a glass of warm milk or caffeine-free herbal tea before bed. · Do tasks and activities during the time of day when you feel your best. It may help to develop a daily routine. · Post labels, lists, and sticky notes to help you remember things. Write your activities on a calendar you can easily find. Put your clock where you can easily see it. · Stay active. Take walks in familiar places, or with friends or loved ones. Try to stay active mentally too. Read and work crossword puzzles if you enjoy these activities. · Do not drive unless you can pass an on-road driving test. If you are not sure if you are safe to drive, your state 's license bureau can test you. · Keep a cordless phone and a flashlight with new batteries by your bed. If possible, put a phone in each of the main rooms of your house, or carry a cell phone in case you fall and cannot reach a phone. Or, you can wear a device around your neck or wrist. You push a button that sends a signal for help. Acknowledge your emotions and plan for the future  · Talk openly and honestly with your doctor. · Let yourself grieve. It is common to feel angry, scared, frustrated, anxious, or depressed. · Get emotional support from family, friends, a support group, or a counselor experienced in working with people who have dementia. · Ask for help if you need it. · Plan for the future. ? Talk to your family and doctor about preparing a living will and other important papers while you can make decisions. These papers tell your doctors how to care for you at the end of your life. ? Consider naming a person to make decisions about your care if you are not able to. When should you call for help? Call 911 anytime you think you may need emergency care.  For example, call if:    · You are lost and do not know whom to call.     · You are injured and do not know whom to call.   [facility] your doctor now or seek immediate medical care if:    · You are more confused or upset than usual.     · You feel like you could hurt yourself because your mind is not working well.   Vidhi Priest closely for changes in your health, and be sure to contact your doctor if you have any problems. Where can you learn more? Go to http://usman-brooklyn.info/. Enter B085 in the search box to learn more about \"Dementia: Care Instructions. \"  Current as of: September 11, 2018  Content Version: 11.9  © 6211-3191 [employer]. Care instructions adapted under license by [employer] (which disclaims liability or warranty for this information). If you have questions about a medical condition or this instruction, always ask your healthcare professional. [address] any warranty or liability for your use of this information.

## 2019-04-01 NOTE — PROGRESS NOTES
Malika Devi is a 68 y.o. male   Chief Complaint   Patient presents with   [facility] [place] Follow Up     [facility] 3/26[facility]19 high blood sugar and weakness; [facility] 3/27/19 for dementia    Pt here for follow up with wife and states that he was not acting himself and was having chills and sweats so he went to [facility] last week and labs were normal and had a CT head as well and this showed no acute abnormality. Day prior pt was at [facility] and had dehydration and was given fluids. The next day at [facility] was normal Cr. His sugars were high and wife states gave him juice. Discussed the elevated sugars can lead to dehydration. Pt was also dx with depression and pt is willing to start something for this. No SI/HI. he is a 68y.o. year old male who presents for evalution. Reviewed PmHx, RxHx, FmHx, SocHx, AllgHx and updated and dated in the chart. Review of Systems - negative except as listed above in the HPI    Objective:     Vitals:    04/01/19 1110   BP: 118/65   Pulse: 73   Resp: 16   Temp: 98.3 °F (36.8 °C)   TempSrc: Oral   SpO2: 98%   Weight: 191 lb (86.6 kg)   Height: (P) 5' 11\" (1.803 m)       Current Outpatient Medications   Medication Sig    Aspirin-Caffeine (BC) 845-65 mg pwpk Take  by mouth.  sertraline (ZOLOFT) 50 mg tablet Take 1 Tab by mouth daily.  losartan-hydroCHLOROthiazide (HYZAAR) 100-25 mg per tablet TAKE ONE TABLET BY MOUTH DAILY    donepezil (ARICEPT) 10 mg tablet TAKE ONE TABLET BY MOUTH EVERY NIGHT AT BEDTIME    ferrous sulfate 325 mg (65 mg iron) tablet TAKE ONE TABLET BY MOUTH THREE TIMES A DAY WITH MEALS    SYMBICORT 160-4.5 mcg/actuation HFAA INHALE TWO PUFFS BY MOUTH TWICE A DAY    simvastatin (ZOCOR) 20 mg tablet TAKE ONE TABLET BY MOUTH EVERY NIGHT    metFORMIN (GLUCOPHAGE) 500 mg tablet Take 500 mg by mouth daily (with dinner).     PROAIR HFA 90 mcg/actuation inhaler USE 1 PUFF BY MOUTH EVERY 4 HOURS AS NEEDED    glipiZIDE (GLUCOTROL) 5 mg tablet TAKE ONE TABLET BY MOUTH TWICE A DAY  Indications: type 2 diabetes mellitus    diclofenac EC (VOLTAREN) 75 mg EC tablet Take 1 Tab by mouth two (2) times daily as needed. Pt to not use BC powder with this    SYMBICORT 160-4.5 mcg/actuation HFAA INHALE TWO PUFFS BY MOUTH TWICE A DAY    simvastatin (ZOCOR) 20 mg tablet Take 20 mg by mouth nightly. No current facility-administered medications for this visit. Physical Examination: General appearance - alert, well appearing, and in no distress  Chest - clear to auscultation, no wheezes, rales or rhonchi, symmetric air entry  Heart - normal rate, regular rhythm, normal S1, S2, no murmurs, rubs, clicks or gallops  Abdomen - soft, nontender, nondistended, no masses or organomegaly      Assessment/ Plan:   Diagnoses and all orders for this visit:    1. Dehydration  Discussed need for proper fluid intake  2. Type 2 diabetes mellitus with hyperglycemia, without long-term current use of insulin ([facility])  -     AMB POC GLUCOSE BLOOD, BY GLUCOSE MONITORING DEVICE  69, avdised to go eat lunch and start monitoring glucose daily  3. Current mild episode of major depressive disorder without prior episode ([facility])  -     sertraline (ZOLOFT) 50 mg tablet; Take 1 Tab by mouth daily. Follow-up and Dispositions    · Return in about 1 month (around 4/29/2019), or if symptoms worsen or fail to improve. I have discussed the diagnosis with the patient and the intended plan as seen in the above orders. The patient has received an after-visit summary and questions were answered concerning future plans. Pt conveyed understanding of plan.     Medication Side Effects and Warnings were discussed with patient      Maribel Hernandez DO

## 2019-04-08 ENCOUNTER — DOCUMENTATION ONLY (OUTPATIENT)
Dept: FAMILY MEDICINE CLINIC | Age: 74
End: 2019-04-08

## 2019-04-09 ENCOUNTER — DOCUMENTATION ONLY (OUTPATIENT)
Dept: FAMILY MEDICINE CLINIC | Age: 74
End: 2019-04-09

## 2019-04-10 ENCOUNTER — DOCUMENTATION ONLY (OUTPATIENT)
Dept: FAMILY MEDICINE CLINIC | Age: 74
End: 2019-04-10

## 2019-04-10 NOTE — PROGRESS NOTES
[facility] order was signed & faxed to [phone],ok,Copy placed in scan folder to be scanned to chart.

## 2019-06-17 RX ORDER — METFORMIN HYDROCHLORIDE 500 MG/1
TABLET, EXTENDED RELEASE ORAL
Qty: 90 TAB | Refills: 2 | Status: SHIPPED | OUTPATIENT
Start: 2019-06-17 | End: 2019-06-21

## 2019-06-17 NOTE — TELEPHONE ENCOUNTER
Spoke with pt wife - will call back to schedule pt appt - pt is having memory lost & she will have to bring pt.

## 2019-06-21 ENCOUNTER — TELEPHONE (OUTPATIENT)
Dept: FAMILY MEDICINE CLINIC | Age: 74
End: 2019-06-21

## 2019-06-21 RX ORDER — METFORMIN HYDROCHLORIDE 500 MG/1
TABLET, EXTENDED RELEASE ORAL
Qty: 180 TAB | Refills: 3 | Status: SHIPPED | OUTPATIENT
Start: 2019-06-21 | End: 2020-09-02

## 2019-06-21 NOTE — TELEPHONE ENCOUNTER
----- Message from Margaret Nichols sent at 6/20/2019  4:11 PM EDT -----  Regarding: Dr. Piyush Del Valle telephone   Pt's wife Flavio Hirsch is requesting a call in regards to pt medication metformin.  Best contact [phone]

## 2019-06-21 NOTE — TELEPHONE ENCOUNTER
Called and spoke with wife. She states that it was mentioned that he should take 2 a day and pharmacy will not let him pick until July unless it is changed to 2 a day. I informed her this was sent in today for 2 a day.

## 2019-07-09 ENCOUNTER — HOSPITAL ENCOUNTER (OUTPATIENT)
Dept: LAB | Age: 74
Discharge: HOME | End: 2019-07-09
Payer: MEDICARE

## 2019-07-09 ENCOUNTER — OFFICE VISIT (OUTPATIENT)
Dept: FAMILY MEDICINE CLINIC | Age: 74
End: 2019-07-09

## 2019-07-09 VITALS
RESPIRATION RATE: 18 BRPM | HEART RATE: 71 BPM | SYSTOLIC BLOOD PRESSURE: 115 MMHG | WEIGHT: 198.6 LBS | HEIGHT: 71 IN | OXYGEN SATURATION: 96 % | BODY MASS INDEX: 27.8 KG/M2 | DIASTOLIC BLOOD PRESSURE: 73 MMHG | TEMPERATURE: 99 F

## 2019-07-09 DIAGNOSIS — K27.9 PUD (PEPTIC ULCER DISEASE): Chronic | ICD-10-CM

## 2019-07-09 DIAGNOSIS — Z00.00 ROUTINE GENERAL MEDICAL EXAMINATION AT A HEALTH CARE FACILITY: Primary | ICD-10-CM

## 2019-07-09 DIAGNOSIS — F32.0 CURRENT MILD EPISODE OF MAJOR DEPRESSIVE DISORDER WITHOUT PRIOR EPISODE (HCC): ICD-10-CM

## 2019-07-09 DIAGNOSIS — E11.21 TYPE 2 DIABETES WITH NEPHROPATHY (HCC): ICD-10-CM

## 2019-07-09 DIAGNOSIS — I10 ESSENTIAL HYPERTENSION: Chronic | ICD-10-CM

## 2019-07-09 DIAGNOSIS — J44.9 CHRONIC OBSTRUCTIVE PULMONARY DISEASE, UNSPECIFIED COPD TYPE (HCC): Chronic | ICD-10-CM

## 2019-07-09 DIAGNOSIS — D50.0 IRON DEFICIENCY ANEMIA DUE TO CHRONIC BLOOD LOSS: Chronic | ICD-10-CM

## 2019-07-09 DIAGNOSIS — F03.90 DEMENTIA WITHOUT BEHAVIORAL DISTURBANCE, UNSPECIFIED DEMENTIA TYPE: ICD-10-CM

## 2019-07-09 DIAGNOSIS — I10 ESSENTIAL HYPERTENSION WITH GOAL BLOOD PRESSURE LESS THAN 140/90: Chronic | ICD-10-CM

## 2019-07-09 PROCEDURE — 82728 ASSAY OF FERRITIN: CPT

## 2019-07-09 PROCEDURE — 83036 HEMOGLOBIN GLYCOSYLATED A1C: CPT

## 2019-07-09 PROCEDURE — 85025 COMPLETE CBC W/AUTO DIFF WBC: CPT

## 2019-07-09 PROCEDURE — 80053 COMPREHEN METABOLIC PANEL: CPT

## 2019-07-09 PROCEDURE — 83550 IRON BINDING TEST: CPT

## 2019-07-09 RX ORDER — BUDESONIDE AND FORMOTEROL FUMARATE DIHYDRATE 160; 4.5 UG/1; UG/1
AEROSOL RESPIRATORY (INHALATION)
Qty: 1 INHALER | Refills: 5 | Status: SHIPPED | OUTPATIENT
Start: 2019-07-09 | End: 2021-01-01

## 2019-07-09 RX ORDER — ALBUTEROL SULFATE 90 UG/1
AEROSOL, METERED RESPIRATORY (INHALATION)
Qty: 1 INHALER | Refills: 4 | Status: SHIPPED | OUTPATIENT
Start: 2019-07-09 | End: 2020-12-30 | Stop reason: SDUPTHER

## 2019-07-09 RX ORDER — LOSARTAN POTASSIUM AND HYDROCHLOROTHIAZIDE 25; 100 MG/1; MG/1
1 TABLET ORAL DAILY
Qty: 90 TAB | Refills: 1 | Status: SHIPPED | OUTPATIENT
Start: 2019-07-09 | End: 2019-07-10 | Stop reason: SDUPTHER

## 2019-07-09 RX ORDER — SERTRALINE HYDROCHLORIDE 50 MG/1
50 TABLET, FILM COATED ORAL DAILY
Qty: 90 TAB | Refills: 1 | Status: SHIPPED | OUTPATIENT
Start: 2019-07-09 | End: 2020-03-13 | Stop reason: SDUPTHER

## 2019-07-09 RX ORDER — SIMVASTATIN 20 MG/1
20 TABLET, FILM COATED ORAL
Qty: 90 TAB | Refills: 1 | Status: SHIPPED | OUTPATIENT
Start: 2019-07-09 | End: 2019-07-10 | Stop reason: SDUPTHER

## 2019-07-09 NOTE — PROGRESS NOTES
Patient here for labs, med refill. Went to [facility] a couple weeks ago for back pain. X-rays showed DDD. 6/10 continuous. Not taking much of the pain medication or muscle relaxors. Patient stopped iron pills and needs iron levels drawn to see if he needs to be put back on them. 1. Have you been to the ER, urgent care clinic since your last visit? Hospitalized since your last visit? No 
 
2. Have you seen or consulted any other health care providers outside of the [address] [place] [address] since your last visit? Include any pap smears or colon screening. No 
 
Due for medcpx Medicare Wellness Exam: Chief Complaint Patient presents with  Labs  Medication Refill  
 
he is a 68y.o. year old male who presents for evaluation for their Medicare Wellness Visit. Fall Screen is completed and assessed=yes Depression Screen is completed and assessed=yes Medication list reviewed and adjusted for accuracy=yes Immunizations reviewed and updated=yes Health/Preventative Screenings reviewed and updated=yes ADL Functions reviewed=yes Patient Active Problem List  
 Diagnosis  Type 2 diabetes with nephropathy ([facility] [address]  Type 2 diabetes mellitus with hyperglycemia, without long-term current use of insulin ([facility] [address]  Osteoarthritis  Advance care planning  Dementia without behavioral disturbance  PUD (peptic ulcer disease)  Iron deficiency anemia  Candidal esophagitis ([facility] [address]  COPD (chronic obstructive pulmonary disease) (HCC)  HTN (hypertension)  Blood loss anemia  GI bleed Reviewed PmHx, RxHx, FmHx, SocHx, AllgHx and updated and dated in the chart. Review of Systems - negative except as listed above in the HPI Objective:  
 
Vitals:  
 07/09/19 0439 BP: 115/73 Pulse: 71 Resp: 18 Temp: 99 °F (37.2 °C) SpO2: 96% Weight: 198 lb 9.6 oz (90.1 kg) Height: 5' 11\" (1.803 m) Physical Examination: General appearance - alert, well appearing, and in no distress Chest - clear to auscultation, no wheezes, rales or rhonchi, symmetric air entry Heart - normal rate, regular rhythm, normal S1, S2, no murmurs, rubs, clicks or gallops Abdomen - soft, nontender, nondistended, no masses or organomegaly Extremities - peripheral pulses normal, no pedal edema, no clubbing or cyanosis Assessment/ Plan:  
Diagnoses and all orders for this visit: 1. Routine general medical examination at a health care facility 
-see below 2. Chronic obstructive pulmonary disease, unspecified COPD type ([facility]) 
-     albuterol (PROAIR HFA) 90 mcg/actuation inhaler; USE 1 PUFF BY MOUTH EVERY 4 HOURS AS NEEDED 
-     budesonide-formoterol (SYMBICORT) 160-4.5 mcg/actuation HFAA; INHALE TWO PUFFS BY MOUTH TWICE A DAY 3. Current mild episode of major depressive disorder without prior episode ([facility]) 
-     sertraline (ZOLOFT) 50 mg tablet; Take 1 Tab by mouth daily. 4. Essential hypertension with goal blood pressure less than 140/90 
-     losartan-hydroCHLOROthiazide (HYZAAR) 100-25 mg per tablet; Take 1 Tab by mouth daily. 
-     METABOLIC PANEL, COMPREHENSIVE 5. Essential hypertension -     METABOLIC PANEL, COMPREHENSIVE 6. Iron deficiency anemia due to chronic blood loss 
-     CBC WITH AUTOMATED DIFF 
-     IRON PROFILE 
-     FERRITIN 7. PUD (peptic ulcer disease) -     CBC WITH AUTOMATED DIFF 
-     IRON PROFILE 
-noncompliant due to dementia and pt will not stop taking BC powders 8. Dementia without behavioral disturbance, unspecified dementia type 
-moderate and stable 9. Type 2 diabetes with nephropathy ([facility]) -     METABOLIC PANEL, COMPREHENSIVE 
-     HEMOGLOBIN A1C WITH EAG 
-     FERRITIN 
- 
Lab Results Component Value Date/Time Hemoglobin A1c 6.2 (H) 03/06/2019 10:23 AM  
 
 
Other orders 
-     simvastatin (ZOCOR) 20 mg tablet; Take 1 Tab by mouth nightly. 
 
  
 
-Pain evaluation performed in office 
-Cognitive Screen performed in office -Depression Screen, Fall risks (by up and go test)  and ADL functionality were addressed 
-Medication list updated and reviewed for any changes  
-A comprehensive review of medical issues and a plan was formulated 
-End of life planning was addressed with pt  
-Health Screenings for preventions were addressed and a plan was formulated 
-Shingles Vaccine was recommended 
-Discussed with patient cancer risk factors and appropriate screenings for age 
-Patient evaluated for colonoscopy and referred if needed per screeing criteria 
-Labs from previous visits were discussed with patient  
-Discussed with patient diet and exercise and formulated a plan as needed 
-An Advanced care plan was developed with the patient. 
-Alcohol screening performed and was negative - Follow-up and Dispositions · Return in about 3 months (around 10/9/2019). I have discussed the diagnosis with the patient and the intended plan as seen in the above orders. The patient understands and agrees with the plan. The patient has received an after-visit summary and questions were answered concerning future plans. Medication Side Effects and Warnings were discussed with patien Patient Labs were reviewed and or requested Patient Past Records were reviewed and or requested There are no Patient Instructions on file for this visit.  
 
 
Wai Dorsey M.D.

## 2019-07-10 DIAGNOSIS — I10 ESSENTIAL HYPERTENSION WITH GOAL BLOOD PRESSURE LESS THAN 140/90: Chronic | ICD-10-CM

## 2019-07-10 LAB
ALBUMIN SERPL-MCNC: 4.5 G/DL (ref 3.5–4.8)
ALBUMIN/GLOB SERPL: 1.7 {RATIO} (ref 1.2–2.2)
ALP SERPL-CCNC: 66 IU/L (ref 39–117)
ALT SERPL-CCNC: 7 IU/L (ref 0–44)
AST SERPL-CCNC: 14 IU/L (ref 0–40)
BASOPHILS # BLD AUTO: 0 X10E3/UL (ref 0–0.2)
BASOPHILS NFR BLD AUTO: 0 %
BILIRUB SERPL-MCNC: 0.9 MG/DL (ref 0–1.2)
BUN SERPL-MCNC: 15 MG/DL (ref 8–27)
BUN/CREAT SERPL: 12 (ref 10–24)
CALCIUM SERPL-MCNC: 9 MG/DL (ref 8.6–10.2)
CHLORIDE SERPL-SCNC: 98 MMOL/L (ref 96–106)
CO2 SERPL-SCNC: 24 MMOL/L (ref 20–29)
CREAT SERPL-MCNC: 1.25 MG/DL (ref 0.76–1.27)
EOSINOPHIL # BLD AUTO: 0.1 X10E3/UL (ref 0–0.4)
EOSINOPHIL NFR BLD AUTO: 1 %
ERYTHROCYTE [DISTWIDTH] IN BLOOD BY AUTOMATED COUNT: 13.3 % (ref 12.3–15.4)
EST. AVERAGE GLUCOSE BLD GHB EST-MCNC: 157 MG/DL
FERRITIN SERPL-MCNC: 17 NG/ML (ref 30–400)
GLOBULIN SER CALC-MCNC: 2.6 G/DL (ref 1.5–4.5)
GLUCOSE SERPL-MCNC: 201 MG/DL (ref 65–99)
HBA1C MFR BLD: 7.1 % (ref 4.8–5.6)
HCT VFR BLD AUTO: 35.6 % (ref 37.5–51)
HGB BLD-MCNC: 11.9 G/DL (ref 13–17.7)
IMM GRANULOCYTES # BLD AUTO: 0 X10E3/UL (ref 0–0.1)
IMM GRANULOCYTES NFR BLD AUTO: 0 %
INTERPRETATION: NORMAL
IRON SATN MFR SERPL: 8 % (ref 15–55)
IRON SERPL-MCNC: 31 UG/DL (ref 38–169)
LYMPHOCYTES # BLD AUTO: 0.9 X10E3/UL (ref 0.7–3.1)
LYMPHOCYTES NFR BLD AUTO: 16 %
Lab: NORMAL
MCH RBC QN AUTO: 30.4 PG (ref 26.6–33)
MCHC RBC AUTO-ENTMCNC: 33.4 G/DL (ref 31.5–35.7)
MCV RBC AUTO: 91 FL (ref 79–97)
MONOCYTES # BLD AUTO: 0.5 X10E3/UL (ref 0.1–0.9)
MONOCYTES NFR BLD AUTO: 8 %
NEUTROPHILS # BLD AUTO: 4.4 X10E3/UL (ref 1.4–7)
NEUTROPHILS NFR BLD AUTO: 75 %
PLATELET # BLD AUTO: 241 X10E3/UL (ref 150–450)
POTASSIUM SERPL-SCNC: 4 MMOL/L (ref 3.5–5.2)
PROT SERPL-MCNC: 7.1 G/DL (ref 6–8.5)
RBC # BLD AUTO: 3.92 X10E6/UL (ref 4.14–5.8)
SODIUM SERPL-SCNC: 139 MMOL/L (ref 134–144)
TIBC SERPL-MCNC: 372 UG/DL (ref 250–450)
UIBC SERPL-MCNC: 341 UG/DL (ref 111–343)
WBC # BLD AUTO: 5.8 X10E3/UL (ref 3.4–10.8)

## 2019-07-10 RX ORDER — LOSARTAN POTASSIUM AND HYDROCHLOROTHIAZIDE 25; 100 MG/1; MG/1
TABLET ORAL
Qty: 90 TAB | Refills: 0 | Status: SHIPPED | OUTPATIENT
Start: 2019-07-10 | End: 2019-11-04

## 2019-07-10 RX ORDER — SIMVASTATIN 20 MG/1
TABLET, FILM COATED ORAL
Qty: 90 TAB | Refills: 0 | Status: SHIPPED | OUTPATIENT
Start: 2019-07-10 | End: 2020-08-31 | Stop reason: SDUPTHER

## 2019-11-04 RX ORDER — TELMISARTAN AND HYDROCHLORTHIAZIDE 80; 25 MG/1; MG/1
1 TABLET ORAL DAILY
Qty: 90 TAB | Refills: 3 | Status: SHIPPED | OUTPATIENT
Start: 2019-11-04 | End: 2022-01-01

## 2019-11-26 ENCOUNTER — HOSPITAL ENCOUNTER (OUTPATIENT)
Dept: LAB | Age: 74
Discharge: HOME | End: 2019-11-26

## 2019-11-26 ENCOUNTER — OFFICE VISIT (OUTPATIENT)
Dept: FAMILY MEDICINE CLINIC | Age: 74
End: 2019-11-26

## 2019-11-26 VITALS
SYSTOLIC BLOOD PRESSURE: 112 MMHG | BODY MASS INDEX: 29.26 KG/M2 | OXYGEN SATURATION: 98 % | WEIGHT: 209 LBS | HEIGHT: 71 IN | HEART RATE: 73 BPM | RESPIRATION RATE: 16 BRPM | TEMPERATURE: 98.4 F | DIASTOLIC BLOOD PRESSURE: 66 MMHG

## 2019-11-26 DIAGNOSIS — I10 ESSENTIAL HYPERTENSION: Chronic | ICD-10-CM

## 2019-11-26 DIAGNOSIS — Z23 ENCOUNTER FOR IMMUNIZATION: ICD-10-CM

## 2019-11-26 DIAGNOSIS — E11.21 TYPE 2 DIABETES WITH NEPHROPATHY (HCC): Primary | ICD-10-CM

## 2019-11-26 DIAGNOSIS — E11.21 TYPE 2 DIABETES WITH NEPHROPATHY (HCC): ICD-10-CM

## 2019-11-26 DIAGNOSIS — F03.90 DEMENTIA WITHOUT BEHAVIORAL DISTURBANCE, UNSPECIFIED DEMENTIA TYPE: ICD-10-CM

## 2019-11-26 DIAGNOSIS — D50.0 IRON DEFICIENCY ANEMIA DUE TO CHRONIC BLOOD LOSS: Chronic | ICD-10-CM

## 2019-11-26 LAB
ALBUMIN SERPL-MCNC: 4 G/DL (ref 3.5–5)
ALBUMIN/GLOB SERPL: 1.3 {RATIO} (ref 1.1–2.2)
ALP SERPL-CCNC: 73 U/L (ref 45–117)
ALT SERPL-CCNC: 14 U/L (ref 12–78)
ANION GAP SERPL CALC-SCNC: 6 MMOL/L (ref 5–15)
AST SERPL-CCNC: 12 U/L (ref 15–37)
BASOPHILS # BLD: 0.1 K/UL (ref 0–0.1)
BASOPHILS NFR BLD: 1 % (ref 0–1)
BILIRUB SERPL-MCNC: 1 MG/DL (ref 0.2–1)
BUN SERPL-MCNC: 22 MG/DL (ref 6–20)
BUN/CREAT SERPL: 18 (ref 12–20)
CALCIUM SERPL-MCNC: 9 MG/DL (ref 8.5–10.1)
CHLORIDE SERPL-SCNC: 101 MMOL/L (ref 97–108)
CHOLEST SERPL-MCNC: 164 MG/DL
CO2 SERPL-SCNC: 27 MMOL/L (ref 21–32)
CREAT SERPL-MCNC: 1.19 MG/DL (ref 0.7–1.3)
DIFFERENTIAL METHOD BLD: ABNORMAL
EOSINOPHIL # BLD: 0 K/UL (ref 0–0.4)
EOSINOPHIL NFR BLD: 0 % (ref 0–7)
ERYTHROCYTE [DISTWIDTH] IN BLOOD BY AUTOMATED COUNT: 13.2 % (ref 11.5–14.5)
EST. AVERAGE GLUCOSE BLD GHB EST-MCNC: 194 MG/DL
FERRITIN SERPL-MCNC: 6 NG/ML (ref 26–388)
GLOBULIN SER CALC-MCNC: 3 G/DL (ref 2–4)
GLUCOSE SERPL-MCNC: 217 MG/DL (ref 65–100)
HBA1C MFR BLD: 8.4 % (ref 4–5.6)
HCT VFR BLD AUTO: 30.7 % (ref 36.6–50.3)
HDLC SERPL-MCNC: 51 MG/DL
HDLC SERPL: 3.2 {RATIO} (ref 0–5)
HGB BLD-MCNC: 9 G/DL (ref 12.1–17)
IMM GRANULOCYTES # BLD AUTO: 0 K/UL (ref 0–0.04)
IMM GRANULOCYTES NFR BLD AUTO: 0 % (ref 0–0.5)
LDLC SERPL CALC-MCNC: 92.6 MG/DL (ref 0–100)
LIPID PROFILE,FLP: NORMAL
LYMPHOCYTES # BLD: 0.7 K/UL (ref 0.8–3.5)
LYMPHOCYTES NFR BLD: 11 % (ref 12–49)
MCH RBC QN AUTO: 24.9 PG (ref 26–34)
MCHC RBC AUTO-ENTMCNC: 29.3 G/DL (ref 30–36.5)
MCV RBC AUTO: 84.8 FL (ref 80–99)
MONOCYTES # BLD: 0.4 K/UL (ref 0–1)
MONOCYTES NFR BLD: 7 % (ref 5–13)
NEUTS SEG # BLD: 4.8 K/UL (ref 1.8–8)
NEUTS SEG NFR BLD: 81 % (ref 32–75)
NRBC # BLD: 0 K/UL (ref 0–0.01)
NRBC BLD-RTO: 0 PER 100 WBC
PLATELET # BLD AUTO: 257 K/UL (ref 150–400)
PMV BLD AUTO: 9.6 FL (ref 8.9–12.9)
POTASSIUM SERPL-SCNC: 4.1 MMOL/L (ref 3.5–5.1)
PROT SERPL-MCNC: 7 G/DL (ref 6.4–8.2)
RBC # BLD AUTO: 3.62 M/UL (ref 4.1–5.7)
RBC MORPH BLD: ABNORMAL
RBC MORPH BLD: ABNORMAL
SODIUM SERPL-SCNC: 134 MMOL/L (ref 136–145)
TRIGL SERPL-MCNC: 102 MG/DL (ref ?–150)
VLDLC SERPL CALC-MCNC: 20.4 MG/DL
WBC # BLD AUTO: 6 K/UL (ref 4.1–11.1)

## 2019-11-26 NOTE — PROGRESS NOTES
Chief Complaint Patient presents with  Immunization/Injection Fluad  Hypertension  Diabetes  Abnormal Lab Results Done 7/9/19: Fasting today 1. Have you been to the ER, urgent care clinic since your last visit? Hospitalized since your last visit? No 
 
2. Have you seen or consulted any other health care providers outside of the [address] [place] [address] since your last visit? Include any pap smears or colon screening. No 
 
Lab Results Component Value Date/Time Microalb/Creat ratio (ug/mg creat.) <3.3 09/19/2018 09:11 AM  
  
Chief Complaint Patient presents with  Immunization/Injection Fluad  Hypertension  Diabetes  Abnormal Lab Results Done 7/9/19: Fasting today  
 
he is a 76y.o. year old male who presents for evaluation. See Diabetic Report Card listed above. Patient Active Problem List  
 Diagnosis  Type 2 diabetes with nephropathy ([facility]  Type 2 diabetes mellitus with hyperglycemia, without long-term current use of insulin ([facility]  Osteoarthritis  Advance care planning  Dementia without behavioral disturbance ([facility]  PUD (peptic ulcer disease)  Iron deficiency anemia  Candidal esophagitis ([facility]  COPD (chronic obstructive pulmonary disease) (HCC)  HTN (hypertension)  Blood loss anemia  GI bleed Reviewed PmHx, RxHx, FmHx, SocHx, AllgHx--dated and updated in the chart. Review of Systems - negative except as listed above in the HPI Objective:  
 
Vitals:  
 11/26/19 1051 BP: 112/66 Pulse: 73 Resp: 16 Temp: 98.4 °F (36.9 °C) TempSrc: Oral  
SpO2: 98% Weight: 209 lb (94.8 kg) Height: 5' 11\" (1.803 m) Physical Examination: General appearance - alert, well appearing, and in no distress Chest - clear to auscultation, no wheezes, rales or rhonchi, symmetric air entry Heart - normal rate, regular rhythm, normal S1, S2, no murmurs, rubs, clicks or gallops Abdomen - soft, nontender, nondistended, no masses or organomegaly Extremities - peripheral pulses normal, no pedal edema, no clubbing or cyanosis Assessment/ Plan:  
Diagnoses and all orders for this visit: 
 
1. Type 2 diabetes with nephropathy (HCC) 
-labs today 
-at goal 
Lab Results Component Value Date/Time Hemoglobin A1c 7.1 (H) 07/09/2019 09:34 AM  
 
 
2. Encounter for immunization 
-     INFLUENZA VACCINE INACTIVATED (IIV), SUBUNIT, ADJUVANTED, IM 
-     ADMIN INFLUENZA VIRUS VAC 
-     PNEUMOCOCCAL CONJ VACCINE 13 VALENT IM 
-     ADMIN PNEUMOCOCCAL VACCINE 
 
3. Essential hypertension -at goal 
 
4. Iron deficiency anemia due to chronic blood loss 
-did not start iron 
-dwp and wife to start 5. Dementia without behavioral disturbance, unspecified dementia type ([facility] -stable Follow-up and Dispositions · Return in about 3 months (around 2/26/2020). Lab Results Component Value Date/Time Cholesterol, total 139 03/06/2019 10:23 AM  
 HDL Cholesterol 52 03/06/2019 10:23 AM  
 LDL, calculated 76 03/06/2019 10:23 AM  
 Triglyceride 53 03/06/2019 10:23 AM  
 
Lab Results Component Value Date/Time Hemoglobin A1c 7.1 (H) 07/09/2019 09:34 AM  
 Hemoglobin A1c 6.2 (H) 03/06/2019 10:23 AM  
 Hemoglobin A1c 6.0 (H) 12/06/2018 10:07 AM  
 Microalb/Creat ratio (ug/mg creat.) <3.3 09/19/2018 09:11 AM  
 LDL, calculated 76 03/06/2019 10:23 AM  
 Creatinine (POC) 0.9 08/02/2013 11:38 AM  
 Creatinine 1.25 07/09/2019 09:34 AM  
  
 
 
Discussed with patient goal of Diabetes to include:  HgA1C <7, LDL cholesterol <100, Blood pressure <140/80. Discussed with patient diet and weight management and to get regular exercise. Recommend yearly eye exams and daily foot care. The patient understands and agrees with the plan. I have discussed the diagnosis with the patient and the intended plan as seen in the above orders.   The patient has received an after-visit summary and questions were answered concerning future plans. Medication Side Effects and Warnings were discussed with patient Patient Labs were reviewed and or requested Patient Past Records were reviewed and or requested Maribel Negro M.D. [facility] There are no Patient Instructions on file for this visit.

## 2020-02-08 DIAGNOSIS — I10 ESSENTIAL HYPERTENSION WITH GOAL BLOOD PRESSURE LESS THAN 140/90: Chronic | ICD-10-CM

## 2020-02-10 RX ORDER — LOSARTAN POTASSIUM AND HYDROCHLOROTHIAZIDE 25; 100 MG/1; MG/1
TABLET ORAL
Qty: 90 TAB | Refills: 0 | Status: SHIPPED | OUTPATIENT
Start: 2020-02-10 | End: 2020-08-11

## 2020-03-13 DIAGNOSIS — F32.0 CURRENT MILD EPISODE OF MAJOR DEPRESSIVE DISORDER WITHOUT PRIOR EPISODE (HCC): ICD-10-CM

## 2020-03-17 RX ORDER — SERTRALINE HYDROCHLORIDE 50 MG/1
50 TABLET, FILM COATED ORAL DAILY
Qty: 90 TAB | Refills: 1 | Status: SHIPPED | OUTPATIENT
Start: 2020-03-17 | End: 2021-01-01 | Stop reason: SDUPTHER

## 2020-08-11 DIAGNOSIS — I10 ESSENTIAL HYPERTENSION WITH GOAL BLOOD PRESSURE LESS THAN 140/90: Chronic | ICD-10-CM

## 2020-08-11 RX ORDER — LOSARTAN POTASSIUM AND HYDROCHLOROTHIAZIDE 25; 100 MG/1; MG/1
TABLET ORAL
Qty: 90 TAB | Refills: 0 | Status: SHIPPED | OUTPATIENT
Start: 2020-08-11 | End: 2021-02-02

## 2020-08-31 RX ORDER — SIMVASTATIN 20 MG/1
TABLET, FILM COATED ORAL
Qty: 90 TAB | Refills: 0 | Status: SHIPPED | OUTPATIENT
Start: 2020-08-31 | End: 2020-12-31 | Stop reason: SDUPTHER

## 2020-10-13 ENCOUNTER — OFFICE VISIT (OUTPATIENT)
Dept: FAMILY MEDICINE CLINIC | Age: 75
End: 2020-10-13
Payer: MEDICARE

## 2020-10-13 VITALS
OXYGEN SATURATION: 98 % | WEIGHT: 207 LBS | TEMPERATURE: 98.2 F | RESPIRATION RATE: 18 BRPM | HEART RATE: 91 BPM | SYSTOLIC BLOOD PRESSURE: 93 MMHG | HEIGHT: 71 IN | DIASTOLIC BLOOD PRESSURE: 61 MMHG | BODY MASS INDEX: 28.98 KG/M2

## 2020-10-13 DIAGNOSIS — E11.65 TYPE 2 DIABETES MELLITUS WITH HYPERGLYCEMIA, WITHOUT LONG-TERM CURRENT USE OF INSULIN (HCC): Chronic | ICD-10-CM

## 2020-10-13 DIAGNOSIS — D50.0 IRON DEFICIENCY ANEMIA DUE TO CHRONIC BLOOD LOSS: Chronic | ICD-10-CM

## 2020-10-13 DIAGNOSIS — Z23 NEEDS FLU SHOT: ICD-10-CM

## 2020-10-13 DIAGNOSIS — F03.90 DEMENTIA WITHOUT BEHAVIORAL DISTURBANCE, UNSPECIFIED DEMENTIA TYPE: ICD-10-CM

## 2020-10-13 DIAGNOSIS — E78.00 HIGH CHOLESTEROL: ICD-10-CM

## 2020-10-13 DIAGNOSIS — I10 ESSENTIAL HYPERTENSION: Primary | Chronic | ICD-10-CM

## 2020-10-13 DIAGNOSIS — Z00.00 MEDICARE ANNUAL WELLNESS VISIT, SUBSEQUENT: ICD-10-CM

## 2020-10-13 DIAGNOSIS — Z13.31 SCREENING FOR DEPRESSION: ICD-10-CM

## 2020-10-13 LAB
ALBUMIN SERPL-MCNC: 4.1 G/DL (ref 3.5–5)
ALBUMIN/GLOB SERPL: 1.4 {RATIO} (ref 1.1–2.2)
ALP SERPL-CCNC: 80 U/L (ref 45–117)
ALT SERPL-CCNC: 12 U/L (ref 12–78)
ANION GAP SERPL CALC-SCNC: 8 MMOL/L (ref 5–15)
AST SERPL-CCNC: 13 U/L (ref 15–37)
BASOPHILS # BLD: 0 K/UL (ref 0–0.1)
BASOPHILS NFR BLD: 0 % (ref 0–1)
BILIRUB SERPL-MCNC: 1.8 MG/DL (ref 0.2–1)
BUN SERPL-MCNC: 18 MG/DL (ref 6–20)
BUN/CREAT SERPL: 14 (ref 12–20)
CALCIUM SERPL-MCNC: 9.1 MG/DL (ref 8.5–10.1)
CHLORIDE SERPL-SCNC: 103 MMOL/L (ref 97–108)
CHOLEST SERPL-MCNC: 142 MG/DL
CO2 SERPL-SCNC: 24 MMOL/L (ref 21–32)
CREAT SERPL-MCNC: 1.33 MG/DL (ref 0.7–1.3)
DIFFERENTIAL METHOD BLD: ABNORMAL
EOSINOPHIL # BLD: 0 K/UL (ref 0–0.4)
EOSINOPHIL NFR BLD: 0 % (ref 0–7)
ERYTHROCYTE [DISTWIDTH] IN BLOOD BY AUTOMATED COUNT: 15.1 % (ref 11.5–14.5)
EST. AVERAGE GLUCOSE BLD GHB EST-MCNC: 212 MG/DL
FOLATE SERPL-MCNC: 48.2 NG/ML (ref 5–21)
GLOBULIN SER CALC-MCNC: 2.9 G/DL (ref 2–4)
GLUCOSE SERPL-MCNC: 229 MG/DL (ref 65–100)
HBA1C MFR BLD: 9 % (ref 4–5.6)
HCT VFR BLD AUTO: 42.3 % (ref 36.6–50.3)
HDLC SERPL-MCNC: 40 MG/DL
HDLC SERPL: 3.6 {RATIO} (ref 0–5)
HGB BLD-MCNC: 14.1 G/DL (ref 12.1–17)
IMM GRANULOCYTES # BLD AUTO: 0.1 K/UL (ref 0–0.04)
IMM GRANULOCYTES NFR BLD AUTO: 1 % (ref 0–0.5)
IRON SATN MFR SERPL: 28 % (ref 20–50)
IRON SERPL-MCNC: 109 UG/DL (ref 35–150)
LDLC SERPL CALC-MCNC: 65.4 MG/DL (ref 0–100)
LIPID PROFILE,FLP: ABNORMAL
LYMPHOCYTES # BLD: 0.8 K/UL (ref 0.8–3.5)
LYMPHOCYTES NFR BLD: 11 % (ref 12–49)
MCH RBC QN AUTO: 29.9 PG (ref 26–34)
MCHC RBC AUTO-ENTMCNC: 33.3 G/DL (ref 30–36.5)
MCV RBC AUTO: 89.6 FL (ref 80–99)
MONOCYTES # BLD: 0.6 K/UL (ref 0–1)
MONOCYTES NFR BLD: 8 % (ref 5–13)
NEUTS SEG # BLD: 5.6 K/UL (ref 1.8–8)
NEUTS SEG NFR BLD: 80 % (ref 32–75)
NRBC # BLD: 0 K/UL (ref 0–0.01)
NRBC BLD-RTO: 0 PER 100 WBC
PLATELET # BLD AUTO: 246 K/UL (ref 150–400)
PMV BLD AUTO: 9.3 FL (ref 8.9–12.9)
POTASSIUM SERPL-SCNC: 3.6 MMOL/L (ref 3.5–5.1)
PROT SERPL-MCNC: 7 G/DL (ref 6.4–8.2)
RBC # BLD AUTO: 4.72 M/UL (ref 4.1–5.7)
RBC MORPH BLD: ABNORMAL
SODIUM SERPL-SCNC: 135 MMOL/L (ref 136–145)
TIBC SERPL-MCNC: 390 UG/DL (ref 250–450)
TRIGL SERPL-MCNC: 183 MG/DL (ref ?–150)
TSH SERPL DL<=0.05 MIU/L-ACNC: 0.28 UIU/ML (ref 0.36–3.74)
VIT B12 SERPL-MCNC: 396 PG/ML (ref 193–986)
VLDLC SERPL CALC-MCNC: 36.6 MG/DL
WBC # BLD AUTO: 7.1 K/UL (ref 4.1–11.1)

## 2020-10-13 PROCEDURE — G8752 SYS BP LESS 140: HCPCS | Performed by: FAMILY MEDICINE

## 2020-10-13 PROCEDURE — 90694 VACC AIIV4 NO PRSRV 0.5ML IM: CPT

## 2020-10-13 PROCEDURE — G8754 DIAS BP LESS 90: HCPCS | Performed by: FAMILY MEDICINE

## 2020-10-13 PROCEDURE — 2022F DILAT RTA XM EVC RTNOPTHY: CPT | Performed by: FAMILY MEDICINE

## 2020-10-13 PROCEDURE — 1101F PT FALLS ASSESS-DOCD LE1/YR: CPT | Performed by: FAMILY MEDICINE

## 2020-10-13 PROCEDURE — G0463 HOSPITAL OUTPT CLINIC VISIT: HCPCS | Performed by: FAMILY MEDICINE

## 2020-10-13 PROCEDURE — G0439 PPPS, SUBSEQ VISIT: HCPCS | Performed by: FAMILY MEDICINE

## 2020-10-13 PROCEDURE — G8510 SCR DEP NEG, NO PLAN REQD: HCPCS | Performed by: FAMILY MEDICINE

## 2020-10-13 PROCEDURE — 3046F HEMOGLOBIN A1C LEVEL >9.0%: CPT | Performed by: FAMILY MEDICINE

## 2020-10-13 PROCEDURE — G8427 DOCREV CUR MEDS BY ELIG CLIN: HCPCS | Performed by: FAMILY MEDICINE

## 2020-10-13 PROCEDURE — G8419 CALC BMI OUT NRM PARAM NOF/U: HCPCS | Performed by: FAMILY MEDICINE

## 2020-10-13 PROCEDURE — 3017F COLORECTAL CA SCREEN DOC REV: CPT | Performed by: FAMILY MEDICINE

## 2020-10-13 PROCEDURE — G8536 NO DOC ELDER MAL SCRN: HCPCS | Performed by: FAMILY MEDICINE

## 2020-10-13 PROCEDURE — 99214 OFFICE O/P EST MOD 30 MIN: CPT | Performed by: FAMILY MEDICINE

## 2020-10-13 NOTE — PROGRESS NOTES
Progress Note 
 
he is a 76y.o. year old male who presents for evalution. Subjective:  
 
Pt here for follow up and last A1C was 8.4. Currently on metformin no issues with medication. Discussed with patient and wife that we would ideally like his A1c to be less than 8. It has been almost a year since prior check therefore well recheck today. hx of anemia from blood loss had received transfusion in past.  Has cut out BC powders. No black stools, no BRBPR. Pt also with htn currently controlled on medications. Known dementia, no behavioral issues. On zocor for cholesterol . Tolerates this fine. Reviewed PmHx, RxHx, FmHx, SocHx, AllgHx and updated and dated in the chart. Review of Systems - negative except as listed above in the HPI Objective:  
 
Vitals:  
 10/13/20 7103 BP: 93/61 Pulse: 91  
Resp: 18 Temp: 98.2 °F (36.8 °C) TempSrc: Oral  
SpO2: 98% Weight: 207 lb (93.9 kg) Height: 5' 11\" (1.803 m) Current Outpatient Medications Medication Sig  
 metFORMIN ER (GLUCOPHAGE XR) 500 mg tablet TAKE 2 TABLETS BY MOUTH DAILY WITH DINNER  
 simvastatin (ZOCOR) 20 mg tablet TAKE ONE TABLET BY MOUTH EVERY NIGHT  losartan-hydroCHLOROthiazide (HYZAAR) 100-25 mg per tablet TAKE ONE TABLET BY MOUTH DAILY  sertraline (ZOLOFT) 50 mg tablet Take 1 Tab by mouth daily.  albuterol (PROAIR HFA) 90 mcg/actuation inhaler USE 1 PUFF BY MOUTH EVERY 4 HOURS AS NEEDED  
 budesonide-formoterol (SYMBICORT) 160-4.5 mcg/actuation HFAA INHALE TWO PUFFS BY MOUTH TWICE A DAY  Aspirin-Caffeine (BC) 845-65 mg pwpk Take  by mouth.  ONETOUCH ULTRA BLUE TEST STRIP strip USE ONE STRIP TO TEST THREE TIMES A DAY  lancets misc OneTouch Ultra blue; dx: e11.65; test daily  ferrous sulfate 325 mg (65 mg iron) tablet TAKE ONE TABLET BY MOUTH THREE TIMES A DAY WITH MEALS  
 SYMBICORT 160-4.5 mcg/actuation HFAA INHALE TWO PUFFS BY MOUTH TWICE A DAY  
  telmisartan-hydroCHLOROthiazide (MICARDIS HCT) 80-25 mg per tablet Take 1 Tab by mouth daily. No current facility-administered medications for this visit. Physical Examination: General appearance - alert, well appearing, and in no distress Ears - bilateral TM's and external ear canals normal 
Chest - clear to auscultation, no wheezes, rales or rhonchi, symmetric air entry Heart - normal rate, regular rhythm, normal S1, S2, no murmurs, rubs, clicks or gallops Abdomen - soft, nontender, nondistended, no masses or organomegaly Neurological - alert, oriented, normal speech, no focal findings or movement disorder noted Musculoskeletal - no joint tenderness, deformity or swelling Extremities - feet w/ onychomycosis, good pulses, normal color, temperature; sensation abnormal, monofilament sensory exam is abnormal in both feet Assessment/ Plan:  
Diagnoses and all orders for this visit: 1. Essential hypertension -     METABOLIC PANEL, COMPREHENSIVE; Future 2. Type 2 diabetes mellitus with hyperglycemia, without long-term current use of insulin (HCC) -     LIPID PANEL; Future -     METABOLIC PANEL, COMPREHENSIVE; Future 
-     HEMOGLOBIN A1C WITH EAG; Future -     MICROALBUMIN, UR, RAND W/ MICROALB/CREAT RATIO; Future 
-     HM DIABETES FOOT EXAM 
 
3. Dementia without behavioral disturbance, unspecified dementia type ([facility] 
-     TSH 3RD GENERATION; Future -     VITAMIN B12 & FOLATE; Future 4. Iron deficiency anemia due to chronic blood loss 
-     CBC WITH AUTOMATED DIFF; Future 
-     IRON PROFILE; Future 5. High cholesterol -     LIPID PANEL; Future -     METABOLIC PANEL, COMPREHENSIVE; Future 6. Medicare annual wellness visit, subsequent 7. Screening for depression 
-     Caty Call 8. Needs flu shot 
-     FLU (FLUAD QUAD INFLUENZA VACCINE,QUAD,ADJUVANTED) discussed need for foot care and checking feet daily. Decreased sensation b/l Follow-up and Dispositions · Return in about 6 months (around 4/13/2021), or if symptoms worsen or fail to improve. I have discussed the diagnosis with the patient and the intended plan as seen in the above orders. The patient has received an after-visit summary and questions were answered concerning future plans. Pt conveyed understanding of plan. Medication Side Effects and Warnings were discussed with patient Anupam Jeffrey, DO This is the Subsequent Medicare Annual Wellness Exam, performed 12 months or more after the Initial AWV or the last Subsequent AWV I have reviewed the patient's medical history in detail and updated the computerized patient record. History Patient Active Problem List  
Diagnosis Code  Blood loss anemia D50.0  GI bleed K92.2  PUD (peptic ulcer disease) K27.9  Iron deficiency anemia D50.9  Candidal esophagitis ([facility]) B37.81  
 COPD (chronic obstructive pulmonary disease) ([facility]) J44.9  
 HTN (hypertension) I10  
 Dementia without behavioral disturbance ([facility]) F03.90  Advance care planning Z71.89  
 Osteoarthritis M19.90  Type 2 diabetes mellitus with hyperglycemia, without long-term current use of insulin ([facility]) E11.65  Type 2 diabetes with nephropathy ([facility]) E11.21 Past Medical History:  
Diagnosis Date  COPD (chronic obstructive pulmonary disease) ([facility])  Diabetes ([facility]  Endocrine disease  Essential hypertension  High cholesterol  Osteoarthritis  Skin cancer  Skin disorder  Sun-damaged skin  Sunburn, blistering Past Surgical History:  
Procedure Laterality Date  HX CERVICAL LAMINECTOMY  HX CHOLECYSTECTOMY  HX LUMBAR LAMINECTOMY  HX ORTHOPAEDIC Bilateral   
 hands and shoulders  HX TONSILLECTOMY Current Outpatient Medications Medication Sig Dispense Refill  metFORMIN ER (GLUCOPHAGE XR) 500 mg tablet TAKE 2 TABLETS BY MOUTH DAILY WITH DINNER 180 Tab 0  
  simvastatin (ZOCOR) 20 mg tablet TAKE ONE TABLET BY MOUTH EVERY NIGHT 90 Tab 0  
 losartan-hydroCHLOROthiazide (HYZAAR) 100-25 mg per tablet TAKE ONE TABLET BY MOUTH DAILY 90 Tab 0  
 sertraline (ZOLOFT) 50 mg tablet Take 1 Tab by mouth daily. 90 Tab 1  
 albuterol (PROAIR HFA) 90 mcg/actuation inhaler USE 1 PUFF BY MOUTH EVERY 4 HOURS AS NEEDED 1 Inhaler 4  
 budesonide-formoterol (SYMBICORT) 160-4.5 mcg/actuation HFAA INHALE TWO PUFFS BY MOUTH TWICE A DAY 1 Inhaler 5  Aspirin-Caffeine (BC) 845-65 mg pwpk Take  by mouth.  ONETOUCH ULTRA BLUE TEST STRIP strip USE ONE STRIP TO TEST THREE TIMES A  Strip PRN  
 lancets misc OneTouch Ultra blue; dx: e11.65; test daily 1 Each 11  
 ferrous sulfate 325 mg (65 mg iron) tablet TAKE ONE TABLET BY MOUTH THREE TIMES A DAY WITH MEALS 90 Tab 4  
 SYMBICORT 160-4.5 mcg/actuation HFAA INHALE TWO PUFFS BY MOUTH TWICE A DAY 1 Inhaler 3  
 telmisartan-hydroCHLOROthiazide (MICARDIS HCT) 80-25 mg per tablet Take 1 Tab by mouth daily. 90 Tab 3 Allergies Allergen Reactions  Pcn [Penicillins] Rash  Sulfa (Sulfonamide Antibiotics) Rash  Demerol [Meperidine] Nausea and Vomiting Severe vomiting Family History Problem Relation Age of Onset  Alzheimer Mother Social History Tobacco Use  Smoking status: Former Smoker Last attempt to quit: 1980 Years since quittin.8  Smokeless tobacco: Current User Types: Snuff Substance Use Topics  Alcohol use: No  
  Comment: none in 6 years Depression Risk Factor Screening:  
 
3 most recent PHQ Screens 10/13/2020 Little interest or pleasure in doing things Not at all Feeling down, depressed, irritable, or hopeless Not at all Total Score PHQ 2 0 Alcohol Risk Screen Do you average more than 1 drink per night or more than 7 drinks a week: No 
 
In the past three months have you have had more than 4 drinks containing alcohol on one occasion: No 
 
 Functional Ability and Level of Safety:  
Hearing: Hearing is good. Activities of Daily Living: The home contains: no safety equipment. Patient needs help with:  transportation, shopping, managing medications and managing money Ambulation: with no difficulty Fall Risk: 
Fall Risk Assessment, last 12 mths 10/13/2020 Able to walk? Yes Fall in past 12 months? No  
 
Abuse Screen: 
Patient is not abused Cognitive Screening Has your family/caregiver stated any concerns about your memory: yes - known dementia Patient Care Team  
Patient Care Team: 
Kim Powers MD as PCP - General (Family Medicine) Kim Powers MD as PCP [facility] Assessment/Plan Education and counseling provided: 
Are appropriate based on today's review and evaluation Diagnoses and all orders for this visit: 1. Essential hypertension -     METABOLIC PANEL, COMPREHENSIVE; Future 2. Type 2 diabetes mellitus with hyperglycemia, without long-term current use of insulin (HCC) -     LIPID PANEL; Future -     METABOLIC PANEL, COMPREHENSIVE; Future 
-     HEMOGLOBIN A1C WITH EAG; Future -     MICROALBUMIN, UR, RAND W/ MICROALB/CREAT RATIO; Future 
-      DIABETES FOOT EXAM 
 
3. Dementia without behavioral disturbance, unspecified dementia type ([facility] 
-     TSH 3RD GENERATION; Future -     VITAMIN B12 & FOLATE; Future 4. Iron deficiency anemia due to chronic blood loss 
-     CBC WITH AUTOMATED DIFF; Future 
-     IRON PROFILE; Future 5. High cholesterol -     LIPID PANEL; Future -     METABOLIC PANEL, COMPREHENSIVE; Future 6. Medicare annual wellness visit, subsequent 7. Screening for depression 
-     [place] [id number] 8. Needs flu shot 
-     FLU (FLUAD QUAD INFLUENZA VACCINE,QUAD,ADJUVANTED) Health Maintenance Due Topic Date Due  
 DTaP/Tdap/Td series (1 - Tdap) 11/06/1966  Shingrix Vaccine Age 50> (1 of 2) 11/06/1995  GLAUCOMA SCREENING Q2Y  11/06/2010  Foot Exam Q1  08/31/2017  MICROALBUMIN Q1  09/19/2019  Eye Exam Retinal or Dilated  06/19/2020  Medicare Yearly Exam  07/09/2020  Flu Vaccine (1) 09/01/2020 I have discussed the diagnosis with the patient and the intended plan as seen in the above orders. The patient has received an after-visit summary and questions were answered concerning future plans. Pt conveyed understanding of plan. Dr Cornelius Delarosa

## 2020-10-13 NOTE — PATIENT INSTRUCTIONS
Medicare Wellness Visit, Male The best way to live healthy is to have a lifestyle where you eat a well-balanced diet, exercise regularly, limit alcohol use, and quit all forms of tobacco/nicotine, if applicable. Regular preventive services are another way to keep healthy. Preventive services (vaccines, screening tests, monitoring & exams) can help personalize your care plan, which helps you manage your own care. Screening tests can find health problems at the earliest stages, when they are easiest to treat. [facility] follows the current, evidence-based guidelines published by the [facility] ([facility]) when recommending preventive services for our patients. Because we follow these guidelines, sometimes recommendations change over time as research supports it. (For example, a prostate screening blood test is no longer routinely recommended for men with no symptoms). Of course, you and your doctor may decide to screen more often for some diseases, based on your risk and co-morbidities (chronic disease you are already diagnosed with). Preventive services for you include: - Medicare offers their members a free annual wellness visit, which is time for you and your primary care provider to discuss and plan for your preventive service needs. Take advantage of this benefit every year! 
-All adults over age 72 should receive the recommended pneumonia vaccines. Current USPSTF guidelines recommend a series of two vaccines for the best pneumonia protection.  
-All adults should have a flu vaccine yearly and tetanus vaccine every 10 years. 
-All adults age 48 and older should receive the shingles vaccines (series of two vaccines).       
-All adults age 38-68 who are overweight should have a diabetes screening test once every three years.  
-Other screening tests & preventive services for persons with diabetes include: an eye exam to screen for diabetic retinopathy, a kidney function test, a foot exam, and stricter control over your cholesterol.  
-Cardiovascular screening for adults with routine risk involves an electrocardiogram (ECG) at intervals determined by the provider.  
-Colorectal cancer screening should be done for adults age 54-65 with no increased risk factors for colorectal cancer. There are a number of acceptable methods of screening for this type of cancer. Each test has its own benefits and drawbacks. Discuss with your provider what is most appropriate for you during your annual wellness visit. The different tests include: colonoscopy (considered the best screening method), a fecal occult blood test, a fecal DNA test, and sigmoidoscopy. 
-All adults born between [facility] should be screened once for Hepatitis C. 
-An Abdominal Aortic Aneurysm (AAA) Screening is recommended for men age 73-68 who has ever smoked in their lifetime. Here is a list of your current Health Maintenance items (your personalized list of preventive services) with a due date: 
Health Maintenance Due Topic Date Due  
 DTaP/Tdap/Td  (1 - Tdap) 11/06/1966  Shingles Vaccine (1 of 2) 11/06/1995  Glaucoma Screening   11/06/2010 [facility] Diabetic Foot Care  08/31/2017  Albumin Urine Test  09/19/2019 [facility] Eye Exam  06/19/2020 [facility] Annual Well Visit  07/09/2020  Yearly Flu Vaccine (1) 09/01/2020

## 2020-10-13 NOTE — PROGRESS NOTES
Chief Complaint Patient presents with  Physical  
 Labs Patient in office today for cpe and fasting labs. Pt would like a flu shot. Have no concerns. 1. Have you been to the ER, urgent care clinic since your last visit? Hospitalized since your last visit? No 
 
2. Have you seen or consulted any other health care providers outside of the [address] [place] [address] since your last visit? Include any pap smears or colon screening.  No

## 2020-10-14 NOTE — PROGRESS NOTES
Diabetes has worsened now at 9 it was previously 8.4 and before that 7.1 it continues to go up. Patient needs to follow a low carbohydrate low sugar diet. I also sent in [address] once daily to take with the Metformin. We need to recheck labs in 3 months. Thyroid level is slightly overactive. Recheck in 3 months with diabetes.  
 
A51 and folic acid were both normal. 
 
LDL cholesterol is at goal. 
 
Please follow-up in 3 months for labs

## 2020-10-16 RX ORDER — REPAGLINIDE 1 MG/1
1 TABLET ORAL
Qty: 90 TAB | Refills: 3 | Status: SHIPPED | OUTPATIENT
Start: 2020-10-16 | End: 2021-01-01

## 2020-10-16 NOTE — PROGRESS NOTES
Pt's daughter, Karl Sotelo, notified as below per Dr. Christa Ga. Verbalizes understanding & is agreeable to plan. She reports that the pt's out of pocket cost for Tradjenta is $200. Can you change to something generic?

## 2020-12-03 ENCOUNTER — OFFICE VISIT (OUTPATIENT)
Dept: FAMILY MEDICINE CLINIC | Age: 75
End: 2020-12-03
Payer: MEDICARE

## 2020-12-03 DIAGNOSIS — Z23 ENCOUNTER FOR IMMUNIZATION: ICD-10-CM

## 2020-12-03 PROCEDURE — 90732 PPSV23 VACC 2 YRS+ SUBQ/IM: CPT | Performed by: FAMILY MEDICINE

## 2020-12-03 PROCEDURE — G0009 ADMIN PNEUMOCOCCAL VACCINE: HCPCS

## 2020-12-03 NOTE — PATIENT INSTRUCTIONS
A Healthy Lifestyle: Care Instructions  Your Care Instructions     A healthy lifestyle can help you feel good, stay at a healthy weight, and have plenty of energy for both work and play. A healthy lifestyle is something you can share with your whole family. A healthy lifestyle also can lower your risk for serious health problems, such as high blood pressure, heart disease, and diabetes. You can follow a few steps listed below to improve your health and the health of your family. Follow-up care is a key part of your treatment and safety. Be sure to make and go to all appointments, and call your doctor if you are having problems. It's also a good idea to know your test results and keep a list of the medicines you take. How can you care for yourself at home? · Do not eat too much sugar, fat, or fast foods. You can still have dessert and treats now and then. The goal is moderation. · Start small to improve your eating habits. Pay attention to portion sizes, drink less juice and soda pop, and eat more fruits and vegetables. ? Eat a healthy amount of food. A 3-ounce serving of meat, for example, is about the size of a deck of cards. Fill the rest of your plate with vegetables and whole grains. ? Limit the amount of soda and sports drinks you have every day. Drink more water when you are thirsty. ? Eat at least 5 servings of fruits and vegetables every day. It may seem like a lot, but it is not hard to reach this goal. A serving or helping is 1 piece of fruit, 1 cup of vegetables, or 2 cups of leafy, raw vegetables. Have an apple or some carrot sticks as an afternoon snack instead of a candy bar. Try to have fruits and/or vegetables at every meal.  · Make exercise part of your daily routine. You may want to start with simple activities, such as walking, bicycling, or slow swimming. Try to be active 30 to 60 minutes every day. You do not need to do all 30 to 60 minutes all at once.  For example, you can exercise 3 times a day for 10 or 20 minutes. Moderate exercise is safe for most people, but it is always a good idea to talk to your doctor before starting an exercise program.  · Keep moving. [place] Held the lawn, work in the garden, or [employer]. Take the stairs instead of the elevator at work. · If you smoke, quit. People who smoke have an increased risk for heart attack, stroke, cancer, and other lung illnesses. Quitting is hard, but there are ways to boost your chance of quitting tobacco for good. ? Use nicotine gum, patches, or lozenges. ? Ask your doctor about stop-smoking programs and medicines. ? Keep trying. In addition to reducing your risk of diseases in the future, you will notice some benefits soon after you stop using tobacco. If you have shortness of breath or asthma symptoms, they will likely get better within a few weeks after you quit. · Limit how much alcohol you drink. Moderate amounts of alcohol (up to 2 drinks a day for men, 1 drink a day for women) are okay. But drinking too much can lead to liver problems, high blood pressure, and other health problems. Family health  If you have a family, there are many things you can do together to improve your health. · Eat meals together as a family as often as possible. · Eat healthy foods. This includes fruits, vegetables, lean meats and dairy, and whole grains. · Include your family in your fitness plan. Most people think of activities such as jogging or tennis as the way to fitness, but there are many ways you and your family can be more active. Anything that makes you breathe hard and gets your heart pumping is exercise. Here are some tips:  ? Walk to do errands or to take your child to school or the bus.  ? Go for a family bike ride after dinner instead of watching TV. Where can you learn more?   Go to http://www.gray.com/  Enter T962 in the search box to learn more about \"A Healthy Lifestyle: Care Instructions. \"  Current as of: January 31, 2020               Content Version: 12.6  © 4523-1135 [employer]. Care instructions adapted under license by [employer] (which disclaims liability or warranty for this information). If you have questions about a medical condition or this instruction, always ask your healthcare professional. [place] [address] any warranty or liability for your use of this information.

## 2020-12-30 DIAGNOSIS — J44.9 CHRONIC OBSTRUCTIVE PULMONARY DISEASE, UNSPECIFIED COPD TYPE (HCC): Chronic | ICD-10-CM

## 2020-12-30 RX ORDER — ALBUTEROL SULFATE 90 UG/1
AEROSOL, METERED RESPIRATORY (INHALATION)
Qty: 1 INHALER | Refills: 4 | Status: SHIPPED | OUTPATIENT
Start: 2020-12-30 | End: 2021-01-01 | Stop reason: SDUPTHER

## 2020-12-31 RX ORDER — SIMVASTATIN 20 MG/1
TABLET, FILM COATED ORAL
Qty: 90 TAB | Refills: 0 | Status: SHIPPED | OUTPATIENT
Start: 2020-12-31 | End: 2021-01-01 | Stop reason: SDUPTHER

## 2020-12-31 RX ORDER — METFORMIN HYDROCHLORIDE 500 MG/1
TABLET, EXTENDED RELEASE ORAL
Qty: 180 TAB | Refills: 0 | Status: SHIPPED | OUTPATIENT
Start: 2020-12-31 | End: 2021-01-01

## 2021-01-01 ENCOUNTER — OFFICE VISIT (OUTPATIENT)
Dept: FAMILY MEDICINE CLINIC | Age: 76
End: 2021-01-01
Payer: MEDICARE

## 2021-01-01 VITALS
HEART RATE: 94 BPM | TEMPERATURE: 99.2 F | WEIGHT: 181 LBS | BODY MASS INDEX: 25.34 KG/M2 | HEIGHT: 71 IN | SYSTOLIC BLOOD PRESSURE: 106 MMHG | RESPIRATION RATE: 17 BRPM | DIASTOLIC BLOOD PRESSURE: 70 MMHG | OXYGEN SATURATION: 97 %

## 2021-01-01 VITALS
SYSTOLIC BLOOD PRESSURE: 121 MMHG | RESPIRATION RATE: 16 BRPM | HEIGHT: 71 IN | BODY MASS INDEX: 26.46 KG/M2 | HEART RATE: 92 BPM | TEMPERATURE: 98.3 F | DIASTOLIC BLOOD PRESSURE: 79 MMHG | OXYGEN SATURATION: 96 % | WEIGHT: 189 LBS

## 2021-01-01 DIAGNOSIS — I10 ESSENTIAL HYPERTENSION WITH GOAL BLOOD PRESSURE LESS THAN 140/90: Chronic | ICD-10-CM

## 2021-01-01 DIAGNOSIS — F03.90 DEMENTIA WITHOUT BEHAVIORAL DISTURBANCE, UNSPECIFIED DEMENTIA TYPE: ICD-10-CM

## 2021-01-01 DIAGNOSIS — D50.0 IRON DEFICIENCY ANEMIA DUE TO CHRONIC BLOOD LOSS: ICD-10-CM

## 2021-01-01 DIAGNOSIS — I10 PRIMARY HYPERTENSION: ICD-10-CM

## 2021-01-01 DIAGNOSIS — J44.9 CHRONIC OBSTRUCTIVE PULMONARY DISEASE, UNSPECIFIED COPD TYPE (HCC): ICD-10-CM

## 2021-01-01 DIAGNOSIS — J44.9 CHRONIC OBSTRUCTIVE PULMONARY DISEASE, UNSPECIFIED COPD TYPE (HCC): Chronic | ICD-10-CM

## 2021-01-01 DIAGNOSIS — E78.00 HIGH CHOLESTEROL: ICD-10-CM

## 2021-01-01 DIAGNOSIS — E11.65 TYPE 2 DIABETES MELLITUS WITH HYPERGLYCEMIA, WITHOUT LONG-TERM CURRENT USE OF INSULIN (HCC): ICD-10-CM

## 2021-01-01 DIAGNOSIS — I10 ESSENTIAL HYPERTENSION: ICD-10-CM

## 2021-01-01 DIAGNOSIS — Z00.01 ENCOUNTER FOR ROUTINE ADULT HEALTH EXAMINATION WITH ABNORMAL FINDINGS: Primary | ICD-10-CM

## 2021-01-01 DIAGNOSIS — F32.0 CURRENT MILD EPISODE OF MAJOR DEPRESSIVE DISORDER WITHOUT PRIOR EPISODE (HCC): ICD-10-CM

## 2021-01-01 DIAGNOSIS — Z12.5 ENCOUNTER FOR SCREENING FOR MALIGNANT NEOPLASM OF PROSTATE: ICD-10-CM

## 2021-01-01 DIAGNOSIS — E11.65 TYPE 2 DIABETES MELLITUS WITH HYPERGLYCEMIA, WITHOUT LONG-TERM CURRENT USE OF INSULIN (HCC): Primary | ICD-10-CM

## 2021-01-01 LAB
ALBUMIN SERPL-MCNC: 3.8 G/DL (ref 3.5–5)
ALBUMIN SERPL-MCNC: 4.1 G/DL (ref 3.5–5)
ALBUMIN/GLOB SERPL: 1.2 {RATIO} (ref 1.1–2.2)
ALBUMIN/GLOB SERPL: 1.3 {RATIO} (ref 1.1–2.2)
ALP SERPL-CCNC: 62 U/L (ref 45–117)
ALP SERPL-CCNC: 64 U/L (ref 45–117)
ALT SERPL-CCNC: 16 U/L (ref 12–78)
ALT SERPL-CCNC: 22 U/L (ref 12–78)
ANION GAP SERPL CALC-SCNC: 5 MMOL/L (ref 5–15)
ANION GAP SERPL CALC-SCNC: 9 MMOL/L (ref 5–15)
AST SERPL-CCNC: 10 U/L (ref 15–37)
AST SERPL-CCNC: 14 U/L (ref 15–37)
BASOPHILS # BLD: 0 K/UL (ref 0–0.1)
BASOPHILS NFR BLD: 1 % (ref 0–1)
BILIRUB SERPL-MCNC: 2.3 MG/DL (ref 0.2–1)
BILIRUB SERPL-MCNC: 2.8 MG/DL (ref 0.2–1)
BUN SERPL-MCNC: 16 MG/DL (ref 6–20)
BUN SERPL-MCNC: 16 MG/DL (ref 6–20)
BUN/CREAT SERPL: 12 (ref 12–20)
BUN/CREAT SERPL: 14 (ref 12–20)
CALCIUM SERPL-MCNC: 9.3 MG/DL (ref 8.5–10.1)
CALCIUM SERPL-MCNC: 9.6 MG/DL (ref 8.5–10.1)
CHLORIDE SERPL-SCNC: 101 MMOL/L (ref 97–108)
CHLORIDE SERPL-SCNC: 102 MMOL/L (ref 97–108)
CHOLEST SERPL-MCNC: 147 MG/DL
CHOLEST SERPL-MCNC: 204 MG/DL
CO2 SERPL-SCNC: 27 MMOL/L (ref 21–32)
CO2 SERPL-SCNC: 30 MMOL/L (ref 21–32)
CREAT SERPL-MCNC: 1.17 MG/DL (ref 0.7–1.3)
CREAT SERPL-MCNC: 1.35 MG/DL (ref 0.7–1.3)
CREAT UR-MCNC: 160 MG/DL
DIFFERENTIAL METHOD BLD: ABNORMAL
EOSINOPHIL # BLD: 0 K/UL (ref 0–0.4)
EOSINOPHIL NFR BLD: 1 % (ref 0–7)
ERYTHROCYTE [DISTWIDTH] IN BLOOD BY AUTOMATED COUNT: 12.1 % (ref 11.5–14.5)
EST. AVERAGE GLUCOSE BLD GHB EST-MCNC: 151 MG/DL
EST. AVERAGE GLUCOSE BLD GHB EST-MCNC: 163 MG/DL
GLOBULIN SER CALC-MCNC: 3.1 G/DL (ref 2–4)
GLOBULIN SER CALC-MCNC: 3.2 G/DL (ref 2–4)
GLUCOSE SERPL-MCNC: 201 MG/DL (ref 65–100)
GLUCOSE SERPL-MCNC: 219 MG/DL (ref 65–100)
HBA1C MFR BLD: 6.9 % (ref 4–5.6)
HBA1C MFR BLD: 7.3 % (ref 4–5.6)
HCT VFR BLD AUTO: 45.3 % (ref 36.6–50.3)
HDLC SERPL-MCNC: 47 MG/DL
HDLC SERPL-MCNC: 56 MG/DL
HDLC SERPL: 3.1 {RATIO} (ref 0–5)
HDLC SERPL: 3.6 {RATIO} (ref 0–5)
HGB BLD-MCNC: 15.3 G/DL (ref 12.1–17)
IMM GRANULOCYTES # BLD AUTO: 0 K/UL (ref 0–0.04)
IMM GRANULOCYTES NFR BLD AUTO: 1 % (ref 0–0.5)
IRON SATN MFR SERPL: 44 % (ref 20–50)
IRON SERPL-MCNC: 156 UG/DL (ref 35–150)
LDLC SERPL CALC-MCNC: 121 MG/DL (ref 0–100)
LDLC SERPL CALC-MCNC: 69.6 MG/DL (ref 0–100)
LYMPHOCYTES # BLD: 0.9 K/UL (ref 0.8–3.5)
LYMPHOCYTES NFR BLD: 15 % (ref 12–49)
MCH RBC QN AUTO: 32.1 PG (ref 26–34)
MCHC RBC AUTO-ENTMCNC: 33.8 G/DL (ref 30–36.5)
MCV RBC AUTO: 95 FL (ref 80–99)
MICROALBUMIN UR-MCNC: 1.58 MG/DL
MICROALBUMIN/CREAT UR-RTO: 10 MG/G (ref 0–30)
MONOCYTES # BLD: 0.5 K/UL (ref 0–1)
MONOCYTES NFR BLD: 9 % (ref 5–13)
NEUTS SEG # BLD: 4.4 K/UL (ref 1.8–8)
NEUTS SEG NFR BLD: 73 % (ref 32–75)
NRBC # BLD: 0 K/UL (ref 0–0.01)
NRBC BLD-RTO: 0 PER 100 WBC
PLATELET # BLD AUTO: 261 K/UL (ref 150–400)
PMV BLD AUTO: 9.5 FL (ref 8.9–12.9)
POTASSIUM SERPL-SCNC: 3.8 MMOL/L (ref 3.5–5.1)
POTASSIUM SERPL-SCNC: 4.1 MMOL/L (ref 3.5–5.1)
PROT SERPL-MCNC: 7 G/DL (ref 6.4–8.2)
PROT SERPL-MCNC: 7.2 G/DL (ref 6.4–8.2)
PSA SERPL-MCNC: 3.2 NG/ML (ref 0–4)
RBC # BLD AUTO: 4.77 M/UL (ref 4.1–5.7)
REFLEX CRITERIA: NORMAL
SODIUM SERPL-SCNC: 137 MMOL/L (ref 136–145)
SODIUM SERPL-SCNC: 137 MMOL/L (ref 136–145)
TIBC SERPL-MCNC: 354 UG/DL (ref 250–450)
TRIGL SERPL-MCNC: 135 MG/DL (ref ?–150)
TRIGL SERPL-MCNC: 152 MG/DL (ref ?–150)
VLDLC SERPL CALC-MCNC: 27 MG/DL
VLDLC SERPL CALC-MCNC: 30.4 MG/DL
WBC # BLD AUTO: 6 K/UL (ref 4.1–11.1)

## 2021-01-01 PROCEDURE — G8752 SYS BP LESS 140: HCPCS | Performed by: FAMILY MEDICINE

## 2021-01-01 PROCEDURE — G8419 CALC BMI OUT NRM PARAM NOF/U: HCPCS | Performed by: FAMILY MEDICINE

## 2021-01-01 PROCEDURE — 99214 OFFICE O/P EST MOD 30 MIN: CPT | Performed by: FAMILY MEDICINE

## 2021-01-01 PROCEDURE — 3046F HEMOGLOBIN A1C LEVEL >9.0%: CPT | Performed by: FAMILY MEDICINE

## 2021-01-01 PROCEDURE — 2022F DILAT RTA XM EVC RTNOPTHY: CPT | Performed by: FAMILY MEDICINE

## 2021-01-01 PROCEDURE — G8536 NO DOC ELDER MAL SCRN: HCPCS | Performed by: FAMILY MEDICINE

## 2021-01-01 PROCEDURE — 3017F COLORECTAL CA SCREEN DOC REV: CPT | Performed by: FAMILY MEDICINE

## 2021-01-01 PROCEDURE — G8510 SCR DEP NEG, NO PLAN REQD: HCPCS | Performed by: FAMILY MEDICINE

## 2021-01-01 PROCEDURE — 3044F HG A1C LEVEL LT 7.0%: CPT | Performed by: FAMILY MEDICINE

## 2021-01-01 PROCEDURE — G8427 DOCREV CUR MEDS BY ELIG CLIN: HCPCS | Performed by: FAMILY MEDICINE

## 2021-01-01 PROCEDURE — G8754 DIAS BP LESS 90: HCPCS | Performed by: FAMILY MEDICINE

## 2021-01-01 PROCEDURE — 1101F PT FALLS ASSESS-DOCD LE1/YR: CPT | Performed by: FAMILY MEDICINE

## 2021-01-01 PROCEDURE — G0439 PPPS, SUBSEQ VISIT: HCPCS | Performed by: FAMILY MEDICINE

## 2021-01-01 RX ORDER — LOSARTAN POTASSIUM AND HYDROCHLOROTHIAZIDE 25; 100 MG/1; MG/1
TABLET ORAL
Qty: 30 TAB | Refills: 0 | Status: SHIPPED
Start: 2021-01-01 | End: 2022-01-01

## 2021-01-01 RX ORDER — DONEPEZIL HYDROCHLORIDE 10 MG/1
10 TABLET, FILM COATED ORAL
COMMUNITY
End: 2022-01-01

## 2021-01-01 RX ORDER — SIMVASTATIN 20 MG/1
TABLET, FILM COATED ORAL
Qty: 90 TAB | Refills: 0 | Status: SHIPPED | OUTPATIENT
Start: 2021-01-01 | End: 2021-01-01 | Stop reason: SDUPTHER

## 2021-01-01 RX ORDER — ALBUTEROL SULFATE 90 UG/1
AEROSOL, METERED RESPIRATORY (INHALATION)
Qty: 1 EACH | Refills: 5 | Status: SHIPPED | OUTPATIENT
Start: 2021-01-01

## 2021-01-01 RX ORDER — SIMVASTATIN 20 MG/1
TABLET, FILM COATED ORAL
Qty: 90 TABLET | Refills: 4 | Status: SHIPPED | OUTPATIENT
Start: 2021-01-01 | End: 2022-01-01

## 2021-01-01 RX ORDER — BUDESONIDE AND FORMOTEROL FUMARATE DIHYDRATE 160; 4.5 UG/1; UG/1
AEROSOL RESPIRATORY (INHALATION)
Qty: 1 EACH | Refills: 5 | Status: SHIPPED | OUTPATIENT
Start: 2021-01-01

## 2021-01-01 RX ORDER — LANCETS
EACH MISCELLANEOUS
Qty: 1 EACH | Refills: 11 | Status: SHIPPED | OUTPATIENT
Start: 2021-01-01

## 2021-01-01 RX ORDER — SERTRALINE HYDROCHLORIDE 50 MG/1
50 TABLET, FILM COATED ORAL DAILY
Qty: 90 TABLET | Refills: 1 | Status: SHIPPED | OUTPATIENT
Start: 2021-01-01 | End: 2022-01-01

## 2021-01-01 RX ORDER — METFORMIN HYDROCHLORIDE 500 MG/1
TABLET, EXTENDED RELEASE ORAL
Qty: 180 TABLET | Refills: 4 | Status: SHIPPED | OUTPATIENT
Start: 2021-01-01

## 2021-01-01 RX ORDER — METFORMIN HYDROCHLORIDE 500 MG/1
TABLET, EXTENDED RELEASE ORAL
Qty: 180 TAB | Refills: 0 | Status: SHIPPED | OUTPATIENT
Start: 2021-01-01 | End: 2021-01-01

## 2021-06-08 NOTE — PROGRESS NOTES
Chief Complaint Patient presents with  Follow-up  Labs Patient presents in office today for 6 month f/u and labs. No concerns. 1. Have you been to the ER, urgent care clinic since your last visit? Hospitalized since your last visit? No 
 
2. Have you seen or consulted any other health care providers outside of the [address] [place] [address] since your last visit? Include any pap smears or colon screening. No 
 
Learning Assessment 7/5/2017 PRIMARY LEARNER Patient HIGHEST LEVEL OF EDUCATION - PRIMARY LEARNER  GRADUATED HIGH SCHOOL OR GED  
BARRIERS PRIMARY LEARNER NONE  
CO-LEARNER CAREGIVER No  
PRIMARY LANGUAGE ENGLISH  
LEARNER PREFERENCE PRIMARY DEMONSTRATION  
ANSWERED BY pat RELATIONSHIP SELF

## 2021-06-08 NOTE — PROGRESS NOTES
Progress Note 
 
he is a 76y.o. year old male who presents for evalution. Subjective:  
 
Pt here for diabetes follow up and is not taking the prandin. Last A1C was 9.0. Not eating 3 meals so will use a diff med. Does not want injectable if can be avoided. Will trial tradjenta. Did discuss possible once weekly injectable and they are not against this. Dementia and wife is caring for him. Memory has been worsening. On Aricept from neuro. Not on zoloft for depression and wife states depression/mood have not been great. Will restart the zoloft. Has not been needing symbicort. Does not smoke uses chew. Breathing has been fine Reviewed PmHx, RxHx, FmHx, SocHx, AllgHx and updated and dated in the chart. Review of Systems - negative except as listed above in the HPI Objective:  
 
Vitals:  
 06/08/21 1048 BP: 121/79 Pulse: 92 Resp: 16 Temp: 98.3 °F (36.8 °C) TempSrc: Oral  
SpO2: 96% Weight: 189 lb (85.7 kg) Height: 5' 11\" (1.803 m) Current Outpatient Medications Medication Sig  
 donepeziL (Aricept) 10 mg tablet Take 10 mg by mouth nightly.  linaGLIPtin (TRADJENTA) 5 mg tablet Take 1 Tablet by mouth daily.  sertraline (ZOLOFT) 50 mg tablet Take 1 Tablet by mouth daily.  losartan-hydroCHLOROthiazide (HYZAAR) 100-25 mg per tablet TAKE ONE TABLET BY MOUTH DAILY  metFORMIN ER (GLUCOPHAGE XR) 500 mg tablet TAKE 2 TABLETS BY MOUTH DAILY WITH DINNER  
 simvastatin (ZOCOR) 20 mg tablet TAKE ONE TABLET BY MOUTH EVERY NIGHT  albuterol (ProAir HFA) 90 mcg/actuation inhaler USE 1 PUFF BY MOUTH EVERY 4 HOURS AS NEEDED  
 telmisartan-hydroCHLOROthiazide (MICARDIS HCT) 80-25 mg per tablet Take 1 Tab by mouth daily.  ONETOUCH ULTRA BLUE TEST STRIP strip USE ONE STRIP TO TEST THREE TIMES A DAY  lancets misc OneTouch Ultra blue; dx: e11.65; test daily  ferrous sulfate 325 mg (65 mg iron) tablet TAKE ONE TABLET BY MOUTH THREE TIMES A DAY WITH MEALS  Aspirin-Caffeine (BC) 845-65 mg pwpk Take  by mouth. (Patient not taking: Reported on 6/8/2021)  SYMBICORT 160-4.5 mcg/actuation HFAA INHALE TWO PUFFS BY MOUTH TWICE A DAY (Patient not taking: Reported on 6/8/2021) No current facility-administered medications for this visit. Physical Examination: General appearance - alert, well appearing, and in no distress Chest - clear to auscultation, no wheezes, rales or rhonchi, symmetric air entry Heart - normal rate, regular rhythm, normal S1, S2, no murmurs, rubs, clicks or gallops Assessment/ Plan:  
Diagnoses and all orders for this visit: 
 
1. Type 2 diabetes mellitus with hyperglycemia, without long-term current use of insulin (HCC) 
-     linaGLIPtin (TRADJENTA) 5 mg tablet; Take 1 Tablet by mouth daily. 
-     HEMOGLOBIN A1C WITH EAG; Future -     METABOLIC PANEL, COMPREHENSIVE; Future -     LIPID PANEL; Future -     MICROALBUMIN, UR, RAND W/ MICROALB/CREAT RATIO; Future 2. Dementia without behavioral disturbance, unspecified dementia type ([facility] On aricept 3. Chronic obstructive pulmonary disease, unspecified COPD type ([facility] No longer smoking doing well 4. Current mild episode of major depressive disorder without prior episode ([facility]) 
-     sertraline (ZOLOFT) 50 mg tablet; Take 1 Tablet by mouth daily. 5. Essential hypertension At goal on medication Follow-up and Dispositions · Return in about 6 weeks (around 7/20/2021), or if symptoms worsen or fail to improve. I have discussed the diagnosis with the patient and the intended plan as seen in the above orders. The patient has received an after-visit summary and questions were answered concerning future plans. Pt conveyed understanding of plan. Medication Side Effects and Warnings were discussed with patient An electronic signature was used to authenticate this note Finn Sequeira DO

## 2021-06-08 NOTE — PATIENT INSTRUCTIONS
A Healthy Lifestyle: Care Instructions Your Care Instructions A healthy lifestyle can help you feel good, stay at a healthy weight, and have plenty of energy for both work and play. A healthy lifestyle is something you can share with your whole family. A healthy lifestyle also can lower your risk for serious health problems, such as high blood pressure, heart disease, and diabetes. You can follow a few steps listed below to improve your health and the health of your family. Follow-up care is a key part of your treatment and safety. Be sure to make and go to all appointments, and call your doctor if you are having problems. It's also a good idea to know your test results and keep a list of the medicines you take. How can you care for yourself at home? · Do not eat too much sugar, fat, or fast foods. You can still have dessert and treats now and then. The goal is moderation. · Start small to improve your eating habits. Pay attention to portion sizes, drink less juice and soda pop, and eat more fruits and vegetables. ? Eat a healthy amount of food. A 3-ounce serving of meat, for example, is about the size of a deck of cards. Fill the rest of your plate with vegetables and whole grains. ? Limit the amount of soda and sports drinks you have every day. Drink more water when you are thirsty. ? Eat plenty of fruits and vegetables every day. Have an apple or some carrot sticks as an afternoon snack instead of a candy bar. Try to have fruits and/or vegetables at every meal. 
· Make exercise part of your daily routine. You may want to start with simple activities, such as walking, bicycling, or slow swimming. Try to be active 30 to 60 minutes every day. You do not need to do all 30 to 60 minutes all at once. For example, you can exercise 3 times a day for 10 or 20 minutes.  Moderate exercise is safe for most people, but it is always a good idea to talk to your doctor before starting an exercise program. 
· Keep moving. Mendel Mustard the lawn, work in the garden, or [place]. Take the stairs instead of the elevator at work. · If you smoke, quit. People who smoke have an increased risk for heart attack, stroke, cancer, and other lung illnesses. Quitting is hard, but there are ways to boost your chance of quitting tobacco for good. ? Use nicotine gum, patches, or lozenges. ? Ask your doctor about stop-smoking programs and medicines. ? Keep trying. In addition to reducing your risk of diseases in the future, you will notice some benefits soon after you stop using tobacco. If you have shortness of breath or asthma symptoms, they will likely get better within a few weeks after you quit. · Limit how much alcohol you drink. Moderate amounts of alcohol (up to 2 drinks a day for men, 1 drink a day for women) are okay. But drinking too much can lead to liver problems, high blood pressure, and other health problems. Family health If you have a family, there are many things you can do together to improve your health. · Eat meals together as a family as often as possible. · Eat healthy foods. This includes fruits, vegetables, lean meats and dairy, and whole grains. · Include your family in your fitness plan. Most people think of activities such as jogging or tennis as the way to fitness, but there are many ways you and your family can be more active. Anything that makes you breathe hard and gets your heart pumping is exercise. Here are some tips: 
? Walk to do errands or to take your child to school or the bus. 
? Go for a family bike ride after dinner instead of watching TV. Where can you learn more? Go to http://www.gray.com/ Enter X025 in the search box to learn more about \"A Healthy Lifestyle: Care Instructions. \" Current as of: September 23, 2020               Content Version: 12.8 © 3962-8045 [employer].   
Care instructions adapted under license by [employer] (which disclaims liability or warranty for this information). If you have questions about a medical condition or this instruction, always ask your healthcare professional. [address] any warranty or liability for your use of this information.

## 2021-06-09 NOTE — PROGRESS NOTES
There has been a huge improvement in the diabetes now at 6.9 from 9.0. LDL or bad cholesterol is a little bit high has gone up now 120s. Continue with the current plan and we can recheck in 3 months.

## 2021-06-09 NOTE — PROGRESS NOTES
Received RC from wife Aundra Blanco. Spoke with wife in reference to labs. Wife verbalized understanding.

## 2021-10-27 PROBLEM — E78.00 HIGH CHOLESTEROL: Status: ACTIVE | Noted: 2021-01-01

## 2021-10-27 NOTE — PROGRESS NOTES
Chief Complaint   Patient presents with    Diabetes     Refill meter, lancets and strips    Hypertension    Labs     Fasting today    Back Pain     Knees     1. Have you been to the ER, urgent care clinic since your last visit? Hospitalized since your last visit? No    2. Have you seen or consulted any other health care providers outside of the [address] [place] [address] since your last visit? Include any pap smears or colon screening. Yes Where: Neurology:  Dr Jenn Wright (Retired)     . [facility]  10/27/2021  Provider:   Krysten:  Diabetes Report Card   1) Have you seen the eye doctor in past year?yes    2) How would you  rate your Diabetic Diet? Good   3) How well do you take care of your feet? Requesting podiatry   4) Do you keep your Primary Care Follow Up Appts?no    5) Do you know your A1C goal?no    6) Do you take your medications daily? yes    7) Do you check your blood sugars?no    8) Have you gained weight?no       9) Do you follow an exercise program?no    10) Can you do better?yes      Lab Results   Component Value Date/Time    Cholesterol, total 204 (H) 06/08/2021 11:35 AM    HDL Cholesterol 56 06/08/2021 11:35 AM    LDL, calculated 121 (H) 06/08/2021 11:35 AM    Triglyceride 135 06/08/2021 11:35 AM    CHOL/HDL Ratio 3.6 06/08/2021 11:35 AM     Lab Results   Component Value Date/Time    Hemoglobin A1c 6.9 (H) 06/08/2021 11:35 AM    Hemoglobin A1c 9.0 (H) 10/13/2020 10:21 AM    Hemoglobin A1c 8.4 (H) 11/26/2019 11:18 AM    Glucose 201 (H) 06/08/2021 11:35 AM    Glucose (POC) 87 03/26/2019 12:52 PM    Glucose POC 69 04/01/2019 11:30 AM    Microalbumin/Creat ratio (mg/g creat) 10 06/08/2021 11:35 AM    Microalbumin,urine random 1.58 06/08/2021 11:35 AM    LDL, calculated 121 (H) 06/08/2021 11:35 AM    Creatinine (POC) 0.9 08/02/2013 11:38 AM    Creatinine 1.17 06/08/2021 11:35 AM            Medicare Wellness Exam:    Chief Complaint   Patient presents with    Diabetes     Refill meter, lancets and strips    Hypertension    Labs     Fasting today    Back Pain     Knees     he is a 76y.o. year old male who presents for evaluation for their Medicare Wellness Visit. Lety Yung is completed and assessed=yes  Depression Screen is completed and assessed=yes  Medication list reviewed and adjusted for accuracy=yes  Immunizations reviewed and updated=yes  Health/Preventative Screenings reviewed and updated=yes  ADL Functions reviewed=yes    Patient Active Problem List    Diagnosis    High cholesterol    Type 2 diabetes with nephropathy ([facility]    Type 2 diabetes mellitus with hyperglycemia, without long-term current use of insulin ([facility]    Osteoarthritis    Advance care planning    Dementia without behavioral disturbance (HCC)    PUD (peptic ulcer disease)    Iron deficiency anemia    Candidal esophagitis (HCC)    COPD (chronic obstructive pulmonary disease) (HCC)    HTN (hypertension)    Blood loss anemia    GI bleed       Reviewed PmHx, RxHx, FmHx, SocHx, AllgHx and updated and dated in the chart. Review of Systems - negative except as listed above in the HPI    Objective:     Vitals:    10/27/21 1008   BP: 106/70   Pulse: 94   Resp: 17   Temp: 99.2 °F (37.3 °C)   TempSrc: Oral   SpO2: 97%   Weight: 181 lb (82.1 kg)   Height: 5' 11\" (1.803 m)       Assessment/ Plan:   Diagnoses and all orders for this visit:    1. Encounter for routine adult health examination with abnormal findings  -     LIPID PANEL; Future  -     METABOLIC PANEL, COMPREHENSIVE; Future  -     HEMOGLOBIN A1C WITH EAG; Future  -     PSA W/ REFLX FREE PSA; Future  -see below    2. Type 2 diabetes mellitus with hyperglycemia, without long-term current use of insulin (HCC)  -     lancets misc; OneTouch Ultra blue; dx: e11.65; test daily  -     LIPID PANEL; Future  -     METABOLIC PANEL, COMPREHENSIVE; Future  -     HEMOGLOBIN A1C WITH EAG;  Future  Lab Results   Component Value Date/Time    Hemoglobin A1c 6.9 (H) 06/08/2021 11:35 AM     -at  Goal  -refer to podiatry and optho    3. Primary hypertension  -     LIPID PANEL; Future  -     METABOLIC PANEL, COMPREHENSIVE; Future  -at goal    4. Iron deficiency anemia due to chronic blood loss  -     CBC WITH AUTOMATED DIFF; Future  -     IRON PROFILE; Future    5. High cholesterol  -     LIPID PANEL; Future  -     METABOLIC PANEL, COMPREHENSIVE; Future    6. Dementia without behavioral disturbance, unspecified dementia type (HCC)  -stable    7. Chronic obstructive pulmonary disease, unspecified COPD type (HCC)  -stable    8. Encounter for screening for malignant neoplasm of prostate  -     PSA W/ REFLX FREE PSA; Future         -Pain evaluation performed in office  -Cognitive Screen performed in office  -Depression Screen, Fall risks (by up and go test)  and ADL functionality were addressed  -Medication list updated and reviewed for any changes   -A comprehensive review of medical issues and a plan was formulated  -End of life planning was addressed with pt   -Health Screenings for preventions were addressed and a plan was formulated  -Shingles Vaccine was recommended  -Discussed with patient cancer risk factors and appropriate screenings for age  -Patient evaluated for colonoscopy and referred if needed per screeing criteria  -Labs from previous visits were discussed with patient   -Discussed with patient diet and exercise and formulated a plan as needed  -An Advanced care plan was developed with the patient.  -Alcohol screening performed and was negative    -  Follow-up and Dispositions    · Return in about 3 months (around 1/27/2022). I have discussed the diagnosis with the patient and the intended plan as seen in the above orders. The patient understands and agrees with the plan. The patient has received an after-visit summary and questions were answered concerning future plans.      Medication Side Effects and Warnings were discussed with patien  Patient Labs were reviewed and or requested  Patient Past Records were reviewed and or requested    There are no Patient Instructions on file for this visit.       Dorcus Cabot, M.D.

## 2021-11-03 NOTE — PROGRESS NOTES
Please contact patient regarding their mychart resulted labs. They have not reviewed them to date.    
 
Dr. Maribeth Huynh

## 2021-11-03 NOTE — PROGRESS NOTES
A letter was sent to the patient at the address on file, with lab results and Dr. Alana Sumner recommendations for the patient.

## 2022-01-01 ENCOUNTER — PATIENT OUTREACH (OUTPATIENT)
Dept: CASE MANAGEMENT | Age: 77
End: 2022-01-01

## 2022-01-01 ENCOUNTER — HOSPICE ADMISSION (OUTPATIENT)
Dept: HOSPICE | Facility: HOSPICE | Age: 77
End: 2022-01-01

## 2022-01-01 ENCOUNTER — APPOINTMENT (OUTPATIENT)
Dept: NON INVASIVE DIAGNOSTICS | Age: 77
DRG: 057 | End: 2022-01-01
Attending: STUDENT IN AN ORGANIZED HEALTH CARE EDUCATION/TRAINING PROGRAM
Payer: MEDICARE

## 2022-01-01 ENCOUNTER — APPOINTMENT (OUTPATIENT)
Dept: MRI IMAGING | Age: 77
DRG: 057 | End: 2022-01-01
Attending: STUDENT IN AN ORGANIZED HEALTH CARE EDUCATION/TRAINING PROGRAM
Payer: MEDICARE

## 2022-01-01 ENCOUNTER — HOSPITAL ENCOUNTER (INPATIENT)
Age: 77
LOS: 9 days | Discharge: NURSING, REHAB OR LONG-TERM CARE | DRG: 057 | End: 2022-01-21
Attending: EMERGENCY MEDICINE | Admitting: FAMILY MEDICINE
Payer: MEDICARE

## 2022-01-01 ENCOUNTER — APPOINTMENT (OUTPATIENT)
Dept: CT IMAGING | Age: 77
DRG: 057 | End: 2022-01-01
Attending: EMERGENCY MEDICINE
Payer: MEDICARE

## 2022-01-01 ENCOUNTER — APPOINTMENT (OUTPATIENT)
Dept: GENERAL RADIOLOGY | Age: 77
DRG: 057 | End: 2022-01-01
Attending: EMERGENCY MEDICINE
Payer: MEDICARE

## 2022-01-01 VITALS
BODY MASS INDEX: 24.24 KG/M2 | WEIGHT: 179 LBS | DIASTOLIC BLOOD PRESSURE: 69 MMHG | RESPIRATION RATE: 18 BRPM | HEIGHT: 72 IN | OXYGEN SATURATION: 99 % | TEMPERATURE: 97.4 F | SYSTOLIC BLOOD PRESSURE: 113 MMHG | HEART RATE: 89 BPM

## 2022-01-01 DIAGNOSIS — D50.0 IRON DEFICIENCY ANEMIA DUE TO CHRONIC BLOOD LOSS: Chronic | ICD-10-CM

## 2022-01-01 DIAGNOSIS — E11.21 TYPE 2 DIABETES WITH NEPHROPATHY (HCC): ICD-10-CM

## 2022-01-01 DIAGNOSIS — I10 PRIMARY HYPERTENSION: Chronic | ICD-10-CM

## 2022-01-01 DIAGNOSIS — F02.80 ALZHEIMER'S DISEASE (HCC): Primary | ICD-10-CM

## 2022-01-01 DIAGNOSIS — M19.90 OSTEOARTHRITIS, UNSPECIFIED OSTEOARTHRITIS TYPE, UNSPECIFIED SITE: ICD-10-CM

## 2022-01-01 DIAGNOSIS — F03.90 DEMENTIA WITHOUT BEHAVIORAL DISTURBANCE, UNSPECIFIED DEMENTIA TYPE: ICD-10-CM

## 2022-01-01 DIAGNOSIS — K27.9 PUD (PEPTIC ULCER DISEASE): Chronic | ICD-10-CM

## 2022-01-01 DIAGNOSIS — R41.82 ALTERED MENTAL STATUS, UNSPECIFIED ALTERED MENTAL STATUS TYPE: ICD-10-CM

## 2022-01-01 DIAGNOSIS — K29.71 GASTROINTESTINAL HEMORRHAGE ASSOCIATED WITH GASTRITIS, UNSPECIFIED GASTRITIS TYPE: ICD-10-CM

## 2022-01-01 DIAGNOSIS — E78.00 HIGH CHOLESTEROL: ICD-10-CM

## 2022-01-01 DIAGNOSIS — J44.9 CHRONIC OBSTRUCTIVE PULMONARY DISEASE, UNSPECIFIED COPD TYPE (HCC): Chronic | ICD-10-CM

## 2022-01-01 DIAGNOSIS — E11.65 TYPE 2 DIABETES MELLITUS WITH HYPERGLYCEMIA, WITHOUT LONG-TERM CURRENT USE OF INSULIN (HCC): Chronic | ICD-10-CM

## 2022-01-01 DIAGNOSIS — G30.9 ALZHEIMER'S DISEASE (HCC): Primary | ICD-10-CM

## 2022-01-01 LAB
ACETONE,ACETX: NORMAL MG/L
ALBUMIN SERPL-MCNC: 3.4 G/DL (ref 3.5–5)
ALBUMIN SERPL-MCNC: 3.8 G/DL (ref 3.5–5)
ALBUMIN SERPL-MCNC: 4 G/DL (ref 3.5–5)
ALBUMIN/GLOB SERPL: 1 {RATIO} (ref 1.1–2.2)
ALBUMIN/GLOB SERPL: 1.1 {RATIO} (ref 1.1–2.2)
ALBUMIN/GLOB SERPL: 1.2 {RATIO} (ref 1.1–2.2)
ALP SERPL-CCNC: 51 U/L (ref 45–117)
ALP SERPL-CCNC: 60 U/L (ref 45–117)
ALP SERPL-CCNC: 67 U/L (ref 45–117)
ALT SERPL-CCNC: 21 U/L (ref 12–78)
ALT SERPL-CCNC: 24 U/L (ref 12–78)
ALT SERPL-CCNC: 28 U/L (ref 12–78)
AMMONIA PLAS-SCNC: 12 UMOL/L
AMPHET UR QL SCN: NEGATIVE
ANION GAP SERPL CALC-SCNC: 4 MMOL/L (ref 5–15)
ANION GAP SERPL CALC-SCNC: 4 MMOL/L (ref 5–15)
ANION GAP SERPL CALC-SCNC: 6 MMOL/L (ref 5–15)
ANION GAP SERPL CALC-SCNC: 7 MMOL/L (ref 5–15)
APAP SERPL-MCNC: <2 UG/ML (ref 10–30)
APPEARANCE UR: CLEAR
AST SERPL-CCNC: 21 U/L (ref 15–37)
AST SERPL-CCNC: 25 U/L (ref 15–37)
AST SERPL-CCNC: 43 U/L (ref 15–37)
ATRIAL RATE: 62 BPM
BACTERIA URNS QL MICRO: NEGATIVE /HPF
BARBITURATES UR QL SCN: NEGATIVE
BASOPHILS # BLD: 0 K/UL (ref 0–0.1)
BASOPHILS # BLD: 0.1 K/UL (ref 0–0.1)
BASOPHILS NFR BLD: 0 % (ref 0–1)
BASOPHILS NFR BLD: 1 % (ref 0–1)
BASOPHILS NFR BLD: 1 % (ref 0–1)
BENZODIAZ UR QL: NEGATIVE
BILIRUB DIRECT SERPL-MCNC: 0.3 MG/DL (ref 0–0.2)
BILIRUB INDIRECT SERPL-MCNC: 1.5 MG/DL (ref 0–1.1)
BILIRUB SERPL-MCNC: 1.5 MG/DL (ref 0.2–1)
BILIRUB SERPL-MCNC: 1.8 MG/DL (ref 0.2–1)
BILIRUB SERPL-MCNC: 1.8 MG/DL (ref 0.2–1)
BILIRUB SERPL-MCNC: 2.3 MG/DL (ref 0.2–1)
BILIRUB UR QL: NEGATIVE
BUN SERPL-MCNC: 11 MG/DL (ref 6–20)
BUN SERPL-MCNC: 13 MG/DL (ref 6–20)
BUN SERPL-MCNC: 16 MG/DL (ref 6–20)
BUN SERPL-MCNC: 17 MG/DL (ref 6–20)
BUN/CREAT SERPL: 12 (ref 12–20)
BUN/CREAT SERPL: 13 (ref 12–20)
BUN/CREAT SERPL: 13 (ref 12–20)
BUN/CREAT SERPL: 17 (ref 12–20)
CALCIUM SERPL-MCNC: 8.8 MG/DL (ref 8.5–10.1)
CALCIUM SERPL-MCNC: 9 MG/DL (ref 8.5–10.1)
CALCIUM SERPL-MCNC: 9.1 MG/DL (ref 8.5–10.1)
CALCIUM SERPL-MCNC: 9.5 MG/DL (ref 8.5–10.1)
CALCULATED P AXIS, ECG09: 31 DEGREES
CALCULATED R AXIS, ECG10: -41 DEGREES
CALCULATED T AXIS, ECG11: 55 DEGREES
CANNABINOIDS UR QL SCN: NEGATIVE
CHAIN OF CUSTODY,CHC: NO
CHLORIDE SERPL-SCNC: 105 MMOL/L (ref 97–108)
CHLORIDE SERPL-SCNC: 107 MMOL/L (ref 97–108)
CHLORIDE SERPL-SCNC: 108 MMOL/L (ref 97–108)
CHLORIDE SERPL-SCNC: 110 MMOL/L (ref 97–108)
CHOLEST SERPL-MCNC: 206 MG/DL
CK SERPL-CCNC: 415 U/L (ref 39–308)
CO2 SERPL-SCNC: 25 MMOL/L (ref 21–32)
CO2 SERPL-SCNC: 28 MMOL/L (ref 21–32)
CO2 SERPL-SCNC: 29 MMOL/L (ref 21–32)
CO2 SERPL-SCNC: 29 MMOL/L (ref 21–32)
COCAINE UR QL SCN: NEGATIVE
COLOR UR: ABNORMAL
COMMENT, HOLDF: NORMAL
COVID-19 RAPID TEST, COVR: NOT DETECTED
CREAT SERPL-MCNC: 0.87 MG/DL (ref 0.7–1.3)
CREAT SERPL-MCNC: 0.95 MG/DL (ref 0.7–1.3)
CREAT SERPL-MCNC: 1.09 MG/DL (ref 0.7–1.3)
CREAT SERPL-MCNC: 1.34 MG/DL (ref 0.7–1.3)
DIAGNOSIS, 93000: NORMAL
DIFFERENTIAL METHOD BLD: ABNORMAL
DIFFERENTIAL METHOD BLD: NORMAL
DIFFERENTIAL METHOD BLD: NORMAL
DRUG SCRN COMMENT,DRGCM: NORMAL
ECHO AV AREA PEAK VELOCITY: 1.6 CM2
ECHO AV AREA PEAK VELOCITY: 1.7 CM2
ECHO AV AREA PEAK VELOCITY: 1.9 CM2
ECHO AV PEAK GRADIENT: 10 MMHG
ECHO AV PEAK VELOCITY: 1.6 M/S
ECHO AV VELOCITY RATIO: 0.5
ECHO LA DIAMETER INDEX: 1.52 CM/M2
ECHO LA DIAMETER: 3.1 CM
ECHO LA VOL 2C: 16 ML (ref 18–58)
ECHO LA VOL 4C: 15 ML (ref 18–58)
ECHO LA VOL BP: 16 ML (ref 18–58)
ECHO LA VOL BP: 16 ML (ref 18–58)
ECHO LA VOLUME AREA LENGTH: 18 ML
ECHO LA VOLUME INDEX A2C: 8 ML/M2 (ref 16–34)
ECHO LA VOLUME INDEX A4C: 7 ML/M2 (ref 16–34)
ECHO LA VOLUME INDEX AREA LENGTH: 9 ML/M2 (ref 16–34)
ECHO LV E' LATERAL VELOCITY: 6 CM/S
ECHO LV E' SEPTAL VELOCITY: 6 CM/S
ECHO LV EDV A2C: 31 ML
ECHO LV EDV A4C: 101 ML
ECHO LV EDV BP: 55 ML (ref 67–155)
ECHO LV EDV BP: 55 ML (ref 67–155)
ECHO LV EDV INDEX A4C: 50 ML/M2
ECHO LV EDV NDEX A2C: 15 ML/M2
ECHO LV EJECTION FRACTION A2C: 31 %
ECHO LV EJECTION FRACTION A4C: 56 %
ECHO LV EJECTION FRACTION BIPLANE: 44 % (ref 55–100)
ECHO LV EJECTION FRACTION BIPLANE: 44 % (ref 55–100)
ECHO LV ESV A2C: 21 ML
ECHO LV ESV A4C: 44 ML
ECHO LV ESV BP: 31 ML (ref 22–58)
ECHO LV ESV INDEX A2C: 10 ML/M2
ECHO LV ESV INDEX A4C: 22 ML/M2
ECHO LV ESV INDEX BP: 15 ML/M2
ECHO LV FRACTIONAL SHORTENING: 35 % (ref 28–44)
ECHO LV INTERNAL DIMENSION DIASTOLE INDEX: 2.65 CM/M2
ECHO LV INTERNAL DIMENSION DIASTOLIC: 5.4 CM (ref 4.2–5.9)
ECHO LV INTERNAL DIMENSION SYSTOLIC INDEX: 1.72 CM/M2
ECHO LV INTERNAL DIMENSION SYSTOLIC: 3.5 CM
ECHO LV IVSD: 0.7 CM (ref 0.6–1)
ECHO LV MASS 2D: 142.9 G (ref 88–224)
ECHO LV MASS INDEX 2D: 70.1 G/M2 (ref 49–115)
ECHO LV POSTERIOR WALL DIASTOLIC: 0.8 CM (ref 0.6–1)
ECHO LV RELATIVE WALL THICKNESS RATIO: 0.3
ECHO LVOT AREA: 3.5 CM2
ECHO LVOT DIAM: 2.1 CM
ECHO LVOT PEAK GRADIENT: 3 MMHG
ECHO LVOT PEAK VELOCITY: 0.8 M/S
ECHO MV A VELOCITY: 0.78 M/S
ECHO MV E DECELERATION TIME (DT): 247.7 MS
ECHO MV E VELOCITY: 0.53 M/S
ECHO MV E/A RATIO: 0.68
ECHO MV E/E' LATERAL: 8.83
ECHO MV E/E' RATIO (AVERAGED): 8.83
ECHO MV E/E' SEPTAL: 8.83
ECHO MV REGURGITANT PEAK GRADIENT: 49 MMHG
ECHO MV REGURGITANT PEAK VELOCITY: 3.5 M/S
ECHO RV FREE WALL PEAK S': 9 CM/S
ECHO RV INTERNAL DIMENSION: 2 CM
ECHO RV TAPSE: 2.3 CM (ref 1.5–2)
EOSINOPHIL # BLD: 0 K/UL (ref 0–0.4)
EOSINOPHIL # BLD: 0.1 K/UL (ref 0–0.4)
EOSINOPHIL NFR BLD: 0 % (ref 0–7)
EOSINOPHIL NFR BLD: 1 % (ref 0–7)
EOSINOPHIL NFR BLD: 2 % (ref 0–7)
EPITH CASTS URNS QL MICRO: ABNORMAL /LPF
ERYTHROCYTE [DISTWIDTH] IN BLOOD BY AUTOMATED COUNT: 11.9 % (ref 11.5–14.5)
ERYTHROCYTE [DISTWIDTH] IN BLOOD BY AUTOMATED COUNT: 12 % (ref 11.5–14.5)
EST. AVERAGE GLUCOSE BLD GHB EST-MCNC: 146 MG/DL
ETHANOL SERPL-MCNC: <10 MG/DL
ETHANOL,ETHX: <10 MG/L
FOLATE SERPL-MCNC: 19.3 NG/ML (ref 5–21)
GLOBULIN SER CALC-MCNC: 3.2 G/DL (ref 2–4)
GLOBULIN SER CALC-MCNC: 3.4 G/DL (ref 2–4)
GLOBULIN SER CALC-MCNC: 3.7 G/DL (ref 2–4)
GLUCOSE BLD STRIP.AUTO-MCNC: 104 MG/DL (ref 65–117)
GLUCOSE BLD STRIP.AUTO-MCNC: 105 MG/DL (ref 65–117)
GLUCOSE BLD STRIP.AUTO-MCNC: 115 MG/DL (ref 65–117)
GLUCOSE BLD STRIP.AUTO-MCNC: 115 MG/DL (ref 65–117)
GLUCOSE BLD STRIP.AUTO-MCNC: 123 MG/DL (ref 65–117)
GLUCOSE BLD STRIP.AUTO-MCNC: 128 MG/DL (ref 65–117)
GLUCOSE BLD STRIP.AUTO-MCNC: 130 MG/DL (ref 65–117)
GLUCOSE BLD STRIP.AUTO-MCNC: 134 MG/DL (ref 65–117)
GLUCOSE BLD STRIP.AUTO-MCNC: 135 MG/DL (ref 65–117)
GLUCOSE BLD STRIP.AUTO-MCNC: 136 MG/DL (ref 65–117)
GLUCOSE BLD STRIP.AUTO-MCNC: 137 MG/DL (ref 65–117)
GLUCOSE BLD STRIP.AUTO-MCNC: 140 MG/DL (ref 65–117)
GLUCOSE BLD STRIP.AUTO-MCNC: 146 MG/DL (ref 65–117)
GLUCOSE BLD STRIP.AUTO-MCNC: 148 MG/DL (ref 65–117)
GLUCOSE BLD STRIP.AUTO-MCNC: 150 MG/DL (ref 65–117)
GLUCOSE BLD STRIP.AUTO-MCNC: 161 MG/DL (ref 65–117)
GLUCOSE BLD STRIP.AUTO-MCNC: 162 MG/DL (ref 65–117)
GLUCOSE BLD STRIP.AUTO-MCNC: 165 MG/DL (ref 65–117)
GLUCOSE BLD STRIP.AUTO-MCNC: 175 MG/DL (ref 65–117)
GLUCOSE BLD STRIP.AUTO-MCNC: 188 MG/DL (ref 65–117)
GLUCOSE BLD STRIP.AUTO-MCNC: 190 MG/DL (ref 65–117)
GLUCOSE BLD STRIP.AUTO-MCNC: 192 MG/DL (ref 65–117)
GLUCOSE BLD STRIP.AUTO-MCNC: 219 MG/DL (ref 65–117)
GLUCOSE BLD STRIP.AUTO-MCNC: 220 MG/DL (ref 65–117)
GLUCOSE BLD STRIP.AUTO-MCNC: 221 MG/DL (ref 65–117)
GLUCOSE BLD STRIP.AUTO-MCNC: 232 MG/DL (ref 65–117)
GLUCOSE BLD STRIP.AUTO-MCNC: 232 MG/DL (ref 65–117)
GLUCOSE BLD STRIP.AUTO-MCNC: 250 MG/DL (ref 65–117)
GLUCOSE BLD STRIP.AUTO-MCNC: 251 MG/DL (ref 65–117)
GLUCOSE BLD STRIP.AUTO-MCNC: 255 MG/DL (ref 65–117)
GLUCOSE BLD STRIP.AUTO-MCNC: 263 MG/DL (ref 65–117)
GLUCOSE BLD STRIP.AUTO-MCNC: 263 MG/DL (ref 65–117)
GLUCOSE BLD STRIP.AUTO-MCNC: 278 MG/DL (ref 65–117)
GLUCOSE BLD STRIP.AUTO-MCNC: 284 MG/DL (ref 65–117)
GLUCOSE BLD STRIP.AUTO-MCNC: 99 MG/DL (ref 65–117)
GLUCOSE SERPL-MCNC: 134 MG/DL (ref 65–100)
GLUCOSE SERPL-MCNC: 155 MG/DL (ref 65–100)
GLUCOSE SERPL-MCNC: 178 MG/DL (ref 65–100)
GLUCOSE SERPL-MCNC: 199 MG/DL (ref 65–100)
GLUCOSE UR STRIP.AUTO-MCNC: NEGATIVE MG/DL
HBA1C MFR BLD: 6.7 % (ref 4–5.6)
HCT VFR BLD AUTO: 39.1 % (ref 36.6–50.3)
HCT VFR BLD AUTO: 41.8 % (ref 36.6–50.3)
HCT VFR BLD AUTO: 43.4 % (ref 36.6–50.3)
HCT VFR BLD AUTO: 43.7 % (ref 36.6–50.3)
HCT VFR BLD AUTO: 44.3 % (ref 36.6–50.3)
HCT VFR BLD AUTO: 44.4 % (ref 36.6–50.3)
HDLC SERPL-MCNC: 55 MG/DL
HDLC SERPL: 3.7 {RATIO} (ref 0–5)
HGB BLD-MCNC: 13.6 G/DL (ref 12.1–17)
HGB BLD-MCNC: 14.7 G/DL (ref 12.1–17)
HGB BLD-MCNC: 14.9 G/DL (ref 12.1–17)
HGB BLD-MCNC: 15.1 G/DL (ref 12.1–17)
HGB BLD-MCNC: 15.2 G/DL (ref 12.1–17)
HGB BLD-MCNC: 15.6 G/DL (ref 12.1–17)
HGB UR QL STRIP: ABNORMAL
IMM GRANULOCYTES # BLD AUTO: 0 K/UL (ref 0–0.04)
IMM GRANULOCYTES # BLD AUTO: 0.1 K/UL (ref 0–0.04)
IMM GRANULOCYTES # BLD AUTO: 0.1 K/UL (ref 0–0.04)
IMM GRANULOCYTES NFR BLD AUTO: 0 % (ref 0–0.5)
IMM GRANULOCYTES NFR BLD AUTO: 1 % (ref 0–0.5)
IMM GRANULOCYTES NFR BLD AUTO: 1 % (ref 0–0.5)
ISOPROPANOL,ISOPX: NORMAL MG/L
KETONES UR QL STRIP.AUTO: NEGATIVE MG/DL
LDLC SERPL CALC-MCNC: 127.8 MG/DL (ref 0–100)
LEUKOCYTE ESTERASE UR QL STRIP.AUTO: NEGATIVE
LIPASE SERPL-CCNC: 164 U/L (ref 73–393)
LYMPHOCYTES # BLD: 0.7 K/UL (ref 0.8–3.5)
LYMPHOCYTES # BLD: 0.9 K/UL (ref 0.8–3.5)
LYMPHOCYTES # BLD: 1 K/UL (ref 0.8–3.5)
LYMPHOCYTES NFR BLD: 11 % (ref 12–49)
LYMPHOCYTES NFR BLD: 13 % (ref 12–49)
LYMPHOCYTES NFR BLD: 13 % (ref 12–49)
LYMPHOCYTES NFR BLD: 15 % (ref 12–49)
LYMPHOCYTES NFR BLD: 16 % (ref 12–49)
LYMPHOCYTES NFR BLD: 9 % (ref 12–49)
MAGNESIUM SERPL-MCNC: 2 MG/DL (ref 1.6–2.4)
MAGNESIUM SERPL-MCNC: 2.1 MG/DL (ref 1.6–2.4)
MAGNESIUM SERPL-MCNC: 2.1 MG/DL (ref 1.6–2.4)
MAGNESIUM SERPL-MCNC: 2.3 MG/DL (ref 1.6–2.4)
MAGNESIUM SERPL-MCNC: 2.3 MG/DL (ref 1.6–2.4)
MAGNESIUM SERPL-MCNC: 2.4 MG/DL (ref 1.6–2.4)
MCH RBC QN AUTO: 32.2 PG (ref 26–34)
MCH RBC QN AUTO: 32.3 PG (ref 26–34)
MCH RBC QN AUTO: 32.4 PG (ref 26–34)
MCH RBC QN AUTO: 32.4 PG (ref 26–34)
MCH RBC QN AUTO: 32.5 PG (ref 26–34)
MCH RBC QN AUTO: 32.8 PG (ref 26–34)
MCHC RBC AUTO-ENTMCNC: 34 G/DL (ref 30–36.5)
MCHC RBC AUTO-ENTMCNC: 34.3 G/DL (ref 30–36.5)
MCHC RBC AUTO-ENTMCNC: 34.8 G/DL (ref 30–36.5)
MCHC RBC AUTO-ENTMCNC: 34.8 G/DL (ref 30–36.5)
MCHC RBC AUTO-ENTMCNC: 35.2 G/DL (ref 30–36.5)
MCHC RBC AUTO-ENTMCNC: 35.2 G/DL (ref 30–36.5)
MCV RBC AUTO: 92.3 FL (ref 80–99)
MCV RBC AUTO: 92.7 FL (ref 80–99)
MCV RBC AUTO: 93.1 FL (ref 80–99)
MCV RBC AUTO: 93.2 FL (ref 80–99)
MCV RBC AUTO: 94.3 FL (ref 80–99)
MCV RBC AUTO: 94.9 FL (ref 80–99)
METHADONE UR QL: NEGATIVE
METHANOL,METHX: NORMAL MG/L
MONOCYTES # BLD: 0.5 K/UL (ref 0–1)
MONOCYTES # BLD: 0.6 K/UL (ref 0–1)
MONOCYTES # BLD: 0.6 K/UL (ref 0–1)
MONOCYTES # BLD: 0.9 K/UL (ref 0–1)
MONOCYTES NFR BLD: 11 % (ref 5–13)
MONOCYTES NFR BLD: 9 % (ref 5–13)
NEUTS SEG # BLD: 4.1 K/UL (ref 1.8–8)
NEUTS SEG # BLD: 4.3 K/UL (ref 1.8–8)
NEUTS SEG # BLD: 4.4 K/UL (ref 1.8–8)
NEUTS SEG # BLD: 4.5 K/UL (ref 1.8–8)
NEUTS SEG # BLD: 4.8 K/UL (ref 1.8–8)
NEUTS SEG # BLD: 6.4 K/UL (ref 1.8–8)
NEUTS SEG NFR BLD: 73 % (ref 32–75)
NEUTS SEG NFR BLD: 74 % (ref 32–75)
NEUTS SEG NFR BLD: 76 % (ref 32–75)
NEUTS SEG NFR BLD: 77 % (ref 32–75)
NEUTS SEG NFR BLD: 77 % (ref 32–75)
NEUTS SEG NFR BLD: 79 % (ref 32–75)
NITRITE UR QL STRIP.AUTO: NEGATIVE
NRBC # BLD: 0 K/UL (ref 0–0.01)
NRBC BLD-RTO: 0 PER 100 WBC
OPIATES UR QL: NEGATIVE
P-R INTERVAL, ECG05: 158 MS
PCP UR QL: NEGATIVE
PH UR STRIP: 6 [PH] (ref 5–8)
PLATELET # BLD AUTO: 172 K/UL (ref 150–400)
PLATELET # BLD AUTO: 188 K/UL (ref 150–400)
PLATELET # BLD AUTO: 209 K/UL (ref 150–400)
PLATELET # BLD AUTO: 225 K/UL (ref 150–400)
PLATELET # BLD AUTO: 237 K/UL (ref 150–400)
PLATELET # BLD AUTO: 255 K/UL (ref 150–400)
PMV BLD AUTO: 9.1 FL (ref 8.9–12.9)
PMV BLD AUTO: 9.2 FL (ref 8.9–12.9)
PMV BLD AUTO: 9.2 FL (ref 8.9–12.9)
PMV BLD AUTO: 9.3 FL (ref 8.9–12.9)
PMV BLD AUTO: 9.3 FL (ref 8.9–12.9)
PMV BLD AUTO: 9.4 FL (ref 8.9–12.9)
POTASSIUM SERPL-SCNC: 3.5 MMOL/L (ref 3.5–5.1)
POTASSIUM SERPL-SCNC: 3.5 MMOL/L (ref 3.5–5.1)
POTASSIUM SERPL-SCNC: 3.8 MMOL/L (ref 3.5–5.1)
POTASSIUM SERPL-SCNC: 3.8 MMOL/L (ref 3.5–5.1)
PROCALCITONIN SERPL-MCNC: <0.05 NG/ML
PROT SERPL-MCNC: 6.8 G/DL (ref 6.4–8.2)
PROT SERPL-MCNC: 7 G/DL (ref 6.4–8.2)
PROT SERPL-MCNC: 7.7 G/DL (ref 6.4–8.2)
PROT UR STRIP-MCNC: NEGATIVE MG/DL
Q-T INTERVAL, ECG07: 422 MS
QRS DURATION, ECG06: 128 MS
QTC CALCULATION (BEZET), ECG08: 428 MS
RBC # BLD AUTO: 4.22 M/UL (ref 4.1–5.7)
RBC # BLD AUTO: 4.53 M/UL (ref 4.1–5.7)
RBC # BLD AUTO: 4.6 M/UL (ref 4.1–5.7)
RBC # BLD AUTO: 4.68 M/UL (ref 4.1–5.7)
RBC # BLD AUTO: 4.69 M/UL (ref 4.1–5.7)
RBC # BLD AUTO: 4.76 M/UL (ref 4.1–5.7)
RBC #/AREA URNS HPF: ABNORMAL /HPF (ref 0–5)
RBC MORPH BLD: ABNORMAL
REPORT STATUS,RSTSX: NORMAL
SALICYLATES SERPL-MCNC: <1.7 MG/DL (ref 2.8–20)
SAMPLES BEING HELD,HOLD: NORMAL
SARS-COV-2, XPLCVT: NOT DETECTED
SERVICE CMNT-IMP: ABNORMAL
SERVICE CMNT-IMP: NORMAL
SODIUM SERPL-SCNC: 140 MMOL/L (ref 136–145)
SODIUM SERPL-SCNC: 140 MMOL/L (ref 136–145)
SODIUM SERPL-SCNC: 141 MMOL/L (ref 136–145)
SODIUM SERPL-SCNC: 141 MMOL/L (ref 136–145)
SOURCE, COVRS: NORMAL
SOURCE, COVRS: NORMAL
SP GR UR REFRACTOMETRY: 1.01 (ref 1–1.03)
SPECIMEN SOURCE: NORMAL
TRIGL SERPL-MCNC: 116 MG/DL (ref ?–150)
TROPONIN-HIGH SENSITIVITY: 10 NG/L (ref 0–76)
TROPONIN-HIGH SENSITIVITY: 14 NG/L (ref 0–76)
TSH SERPL DL<=0.05 MIU/L-ACNC: 0.75 UIU/ML (ref 0.36–3.74)
UR CULT HOLD, URHOLD: NORMAL
UROBILINOGEN UR QL STRIP.AUTO: 0.2 EU/DL (ref 0.2–1)
VENTRICULAR RATE, ECG03: 62 BPM
VIT B12 SERPL-MCNC: 324 PG/ML (ref 193–986)
VLDLC SERPL CALC-MCNC: 23.2 MG/DL
WBC # BLD AUTO: 5.4 K/UL (ref 4.1–11.1)
WBC # BLD AUTO: 5.7 K/UL (ref 4.1–11.1)
WBC # BLD AUTO: 5.9 K/UL (ref 4.1–11.1)
WBC # BLD AUTO: 6.1 K/UL (ref 4.1–11.1)
WBC # BLD AUTO: 6.4 K/UL (ref 4.1–11.1)
WBC # BLD AUTO: 8.1 K/UL (ref 4.1–11.1)
WBC URNS QL MICRO: ABNORMAL /HPF (ref 0–4)

## 2022-01-01 PROCEDURE — 85025 COMPLETE CBC W/AUTO DIFF WBC: CPT

## 2022-01-01 PROCEDURE — 74011636637 HC RX REV CODE- 636/637: Performed by: STUDENT IN AN ORGANIZED HEALTH CARE EDUCATION/TRAINING PROGRAM

## 2022-01-01 PROCEDURE — 83690 ASSAY OF LIPASE: CPT

## 2022-01-01 PROCEDURE — 77030040831 HC BAG URINE DRNG MDII -A

## 2022-01-01 PROCEDURE — 74011250637 HC RX REV CODE- 250/637: Performed by: STUDENT IN AN ORGANIZED HEALTH CARE EDUCATION/TRAINING PROGRAM

## 2022-01-01 PROCEDURE — 82962 GLUCOSE BLOOD TEST: CPT

## 2022-01-01 PROCEDURE — 74011250636 HC RX REV CODE- 250/636: Performed by: STUDENT IN AN ORGANIZED HEALTH CARE EDUCATION/TRAINING PROGRAM

## 2022-01-01 PROCEDURE — 84443 ASSAY THYROID STIM HORMONE: CPT

## 2022-01-01 PROCEDURE — 97530 THERAPEUTIC ACTIVITIES: CPT

## 2022-01-01 PROCEDURE — 93306 TTE W/DOPPLER COMPLETE: CPT | Performed by: INTERNAL MEDICINE

## 2022-01-01 PROCEDURE — 74011000250 HC RX REV CODE- 250: Performed by: STUDENT IN AN ORGANIZED HEALTH CARE EDUCATION/TRAINING PROGRAM

## 2022-01-01 PROCEDURE — 65270000029 HC RM PRIVATE

## 2022-01-01 PROCEDURE — 99232 SBSQ HOSP IP/OBS MODERATE 35: CPT | Performed by: FAMILY MEDICINE

## 2022-01-01 PROCEDURE — 99231 SBSQ HOSP IP/OBS SF/LOW 25: CPT | Performed by: PSYCHIATRY & NEUROLOGY

## 2022-01-01 PROCEDURE — 74011250636 HC RX REV CODE- 250/636: Performed by: EMERGENCY MEDICINE

## 2022-01-01 PROCEDURE — 80053 COMPREHEN METABOLIC PANEL: CPT

## 2022-01-01 PROCEDURE — 71045 X-RAY EXAM CHEST 1 VIEW: CPT

## 2022-01-01 PROCEDURE — 83735 ASSAY OF MAGNESIUM: CPT

## 2022-01-01 PROCEDURE — 80179 DRUG ASSAY SALICYLATE: CPT

## 2022-01-01 PROCEDURE — 82746 ASSAY OF FOLIC ACID SERUM: CPT

## 2022-01-01 PROCEDURE — 80048 BASIC METABOLIC PNL TOTAL CA: CPT

## 2022-01-01 PROCEDURE — 70553 MRI BRAIN STEM W/O & W/DYE: CPT

## 2022-01-01 PROCEDURE — A9575 INJ GADOTERATE MEGLUMI 0.1ML: HCPCS | Performed by: RADIOLOGY

## 2022-01-01 PROCEDURE — 74011250636 HC RX REV CODE- 250/636: Performed by: RADIOLOGY

## 2022-01-01 PROCEDURE — 97116 GAIT TRAINING THERAPY: CPT

## 2022-01-01 PROCEDURE — 36415 COLL VENOUS BLD VENIPUNCTURE: CPT

## 2022-01-01 PROCEDURE — 2709999900 HC NON-CHARGEABLE SUPPLY

## 2022-01-01 PROCEDURE — 82248 BILIRUBIN DIRECT: CPT

## 2022-01-01 PROCEDURE — 97162 PT EVAL MOD COMPLEX 30 MIN: CPT

## 2022-01-01 PROCEDURE — 99222 1ST HOSP IP/OBS MODERATE 55: CPT | Performed by: PSYCHIATRY & NEUROLOGY

## 2022-01-01 PROCEDURE — 74011250636 HC RX REV CODE- 250/636

## 2022-01-01 PROCEDURE — 70450 CT HEAD/BRAIN W/O DYE: CPT

## 2022-01-01 PROCEDURE — 99222 1ST HOSP IP/OBS MODERATE 55: CPT | Performed by: FAMILY MEDICINE

## 2022-01-01 PROCEDURE — 93005 ELECTROCARDIOGRAM TRACING: CPT

## 2022-01-01 PROCEDURE — 80061 LIPID PANEL: CPT

## 2022-01-01 PROCEDURE — 82140 ASSAY OF AMMONIA: CPT

## 2022-01-01 PROCEDURE — 96374 THER/PROPH/DIAG INJ IV PUSH: CPT

## 2022-01-01 PROCEDURE — 84484 ASSAY OF TROPONIN QUANT: CPT

## 2022-01-01 PROCEDURE — 97165 OT EVAL LOW COMPLEX 30 MIN: CPT

## 2022-01-01 PROCEDURE — 84145 PROCALCITONIN (PCT): CPT

## 2022-01-01 PROCEDURE — 93306 TTE W/DOPPLER COMPLETE: CPT

## 2022-01-01 PROCEDURE — 92610 EVALUATE SWALLOWING FUNCTION: CPT | Performed by: PHYSICAL THERAPIST

## 2022-01-01 PROCEDURE — 70544 MR ANGIOGRAPHY HEAD W/O DYE: CPT

## 2022-01-01 PROCEDURE — 82077 ASSAY SPEC XCP UR&BREATH IA: CPT

## 2022-01-01 PROCEDURE — 87635 SARS-COV-2 COVID-19 AMP PRB: CPT

## 2022-01-01 PROCEDURE — 99238 HOSP IP/OBS DSCHRG MGMT 30/<: CPT | Performed by: STUDENT IN AN ORGANIZED HEALTH CARE EDUCATION/TRAINING PROGRAM

## 2022-01-01 PROCEDURE — 82607 VITAMIN B-12: CPT

## 2022-01-01 PROCEDURE — 81001 URINALYSIS AUTO W/SCOPE: CPT

## 2022-01-01 PROCEDURE — 74011250637 HC RX REV CODE- 250/637: Performed by: EMERGENCY MEDICINE

## 2022-01-01 PROCEDURE — 94640 AIRWAY INHALATION TREATMENT: CPT

## 2022-01-01 PROCEDURE — 83036 HEMOGLOBIN GLYCOSYLATED A1C: CPT

## 2022-01-01 PROCEDURE — U0005 INFEC AGEN DETEC AMPLI PROBE: HCPCS

## 2022-01-01 PROCEDURE — 80143 DRUG ASSAY ACETAMINOPHEN: CPT

## 2022-01-01 PROCEDURE — 80307 DRUG TEST PRSMV CHEM ANLYZR: CPT

## 2022-01-01 PROCEDURE — 92610 EVALUATE SWALLOWING FUNCTION: CPT

## 2022-01-01 PROCEDURE — 97535 SELF CARE MNGMENT TRAINING: CPT

## 2022-01-01 PROCEDURE — 99285 EMERGENCY DEPT VISIT HI MDM: CPT

## 2022-01-01 PROCEDURE — 80320 DRUG SCREEN QUANTALCOHOLS: CPT

## 2022-01-01 PROCEDURE — 82550 ASSAY OF CK (CPK): CPT

## 2022-01-01 PROCEDURE — 94761 N-INVAS EAR/PLS OXIMETRY MLT: CPT

## 2022-01-01 RX ORDER — ONDANSETRON 2 MG/ML
4 INJECTION INTRAMUSCULAR; INTRAVENOUS
Status: DISCONTINUED | OUTPATIENT
Start: 2022-01-01 | End: 2022-01-01 | Stop reason: HOSPADM

## 2022-01-01 RX ORDER — INSULIN GLARGINE 100 [IU]/ML
5 INJECTION, SOLUTION SUBCUTANEOUS
Status: DISCONTINUED | OUTPATIENT
Start: 2022-01-01 | End: 2022-01-01 | Stop reason: HOSPADM

## 2022-01-01 RX ORDER — LORAZEPAM 2 MG/ML
2 INJECTION INTRAMUSCULAR ONCE
Status: COMPLETED | OUTPATIENT
Start: 2022-01-01 | End: 2022-01-01

## 2022-01-01 RX ORDER — QUETIAPINE FUMARATE 25 MG/1
25 TABLET, FILM COATED ORAL 2 TIMES DAILY
Status: DISCONTINUED | OUTPATIENT
Start: 2022-01-01 | End: 2022-01-01 | Stop reason: HOSPADM

## 2022-01-01 RX ORDER — QUETIAPINE FUMARATE 25 MG/1
25 TABLET, FILM COATED ORAL
Status: DISCONTINUED | OUTPATIENT
Start: 2022-01-01 | End: 2022-01-01

## 2022-01-01 RX ORDER — LORAZEPAM 2 MG/ML
2 INJECTION INTRAMUSCULAR ONCE
Status: DISCONTINUED | OUTPATIENT
Start: 2022-01-01 | End: 2022-01-01

## 2022-01-01 RX ORDER — QUETIAPINE FUMARATE 25 MG/1
50 TABLET, FILM COATED ORAL 2 TIMES DAILY
Status: CANCELLED | OUTPATIENT
Start: 2022-01-01

## 2022-01-01 RX ORDER — SODIUM CHLORIDE 9 MG/ML
50 INJECTION, SOLUTION INTRAVENOUS CONTINUOUS
Status: DISCONTINUED | OUTPATIENT
Start: 2022-01-01 | End: 2022-01-01

## 2022-01-01 RX ORDER — LORAZEPAM 2 MG/ML
1 INJECTION INTRAMUSCULAR
Status: DISCONTINUED | OUTPATIENT
Start: 2022-01-01 | End: 2022-01-01 | Stop reason: HOSPADM

## 2022-01-01 RX ORDER — IBUPROFEN 200 MG
1 TABLET ORAL DAILY
Qty: 30 PATCH | Refills: 0 | Status: SHIPPED
Start: 2022-01-01 | End: 2022-02-20

## 2022-01-01 RX ORDER — SODIUM CHLORIDE 0.9 % (FLUSH) 0.9 %
5-40 SYRINGE (ML) INJECTION EVERY 8 HOURS
Status: DISCONTINUED | OUTPATIENT
Start: 2022-01-01 | End: 2022-01-01 | Stop reason: HOSPADM

## 2022-01-01 RX ORDER — GADOTERATE MEGLUMINE 376.9 MG/ML
15 INJECTION INTRAVENOUS
Status: COMPLETED | OUTPATIENT
Start: 2022-01-01 | End: 2022-01-01

## 2022-01-01 RX ORDER — LORAZEPAM 2 MG/ML
1 INJECTION INTRAMUSCULAR
Status: COMPLETED | OUTPATIENT
Start: 2022-01-01 | End: 2022-01-01

## 2022-01-01 RX ORDER — POLYETHYLENE GLYCOL 3350 17 G/17G
17 POWDER, FOR SOLUTION ORAL DAILY PRN
Status: DISCONTINUED | OUTPATIENT
Start: 2022-01-01 | End: 2022-01-01 | Stop reason: HOSPADM

## 2022-01-01 RX ORDER — DEXTROSE 50 % IN WATER (D50W) INTRAVENOUS SYRINGE
12.5-25 AS NEEDED
Status: DISCONTINUED | OUTPATIENT
Start: 2022-01-01 | End: 2022-01-01 | Stop reason: HOSPADM

## 2022-01-01 RX ORDER — LIDOCAINE 4 G/100G
1 PATCH TOPICAL EVERY 24 HOURS
Status: DISCONTINUED | OUTPATIENT
Start: 2022-01-01 | End: 2022-01-01 | Stop reason: HOSPADM

## 2022-01-01 RX ORDER — ACETAMINOPHEN 325 MG/1
650 TABLET ORAL
Status: DISCONTINUED | OUTPATIENT
Start: 2022-01-01 | End: 2022-01-01 | Stop reason: HOSPADM

## 2022-01-01 RX ORDER — INSULIN LISPRO 100 [IU]/ML
INJECTION, SOLUTION INTRAVENOUS; SUBCUTANEOUS
Status: DISCONTINUED | OUTPATIENT
Start: 2022-01-01 | End: 2022-01-01 | Stop reason: HOSPADM

## 2022-01-01 RX ORDER — ACETAMINOPHEN 650 MG/1
650 SUPPOSITORY RECTAL
Status: DISCONTINUED | OUTPATIENT
Start: 2022-01-01 | End: 2022-01-01 | Stop reason: HOSPADM

## 2022-01-01 RX ORDER — QUETIAPINE FUMARATE 25 MG/1
25 TABLET, FILM COATED ORAL 2 TIMES DAILY
Qty: 60 TABLET | Refills: 1 | Status: SHIPPED
Start: 2022-01-01 | End: 2022-02-19

## 2022-01-01 RX ORDER — MAGNESIUM SULFATE 100 %
4 CRYSTALS MISCELLANEOUS AS NEEDED
Status: DISCONTINUED | OUTPATIENT
Start: 2022-01-01 | End: 2022-01-01 | Stop reason: HOSPADM

## 2022-01-01 RX ORDER — DIPHENHYDRAMINE HYDROCHLORIDE 50 MG/ML
50 INJECTION, SOLUTION INTRAMUSCULAR; INTRAVENOUS ONCE
Status: COMPLETED | OUTPATIENT
Start: 2022-01-01 | End: 2022-01-01

## 2022-01-01 RX ORDER — HALOPERIDOL 5 MG/ML
5 INJECTION INTRAMUSCULAR ONCE
Status: COMPLETED | OUTPATIENT
Start: 2022-01-01 | End: 2022-01-01

## 2022-01-01 RX ORDER — ALBUTEROL SULFATE 0.83 MG/ML
2.5 SOLUTION RESPIRATORY (INHALATION)
Status: DISCONTINUED | OUTPATIENT
Start: 2022-01-01 | End: 2022-01-01 | Stop reason: HOSPADM

## 2022-01-01 RX ORDER — LORAZEPAM 2 MG/ML
2 INJECTION INTRAMUSCULAR
Status: DISCONTINUED | OUTPATIENT
Start: 2022-01-01 | End: 2022-01-01

## 2022-01-01 RX ORDER — ONDANSETRON 4 MG/1
4 TABLET, ORALLY DISINTEGRATING ORAL
Status: DISCONTINUED | OUTPATIENT
Start: 2022-01-01 | End: 2022-01-01 | Stop reason: HOSPADM

## 2022-01-01 RX ORDER — LORAZEPAM 2 MG/ML
INJECTION INTRAMUSCULAR
Status: COMPLETED
Start: 2022-01-01 | End: 2022-01-01

## 2022-01-01 RX ORDER — LANOLIN ALCOHOL/MO/W.PET/CERES
3 CREAM (GRAM) TOPICAL
Status: DISCONTINUED | OUTPATIENT
Start: 2022-01-01 | End: 2022-01-01 | Stop reason: HOSPADM

## 2022-01-01 RX ORDER — INSULIN LISPRO 100 [IU]/ML
INJECTION, SOLUTION INTRAVENOUS; SUBCUTANEOUS EVERY 6 HOURS
Status: DISCONTINUED | OUTPATIENT
Start: 2022-01-01 | End: 2022-01-01

## 2022-01-01 RX ORDER — ENOXAPARIN SODIUM 100 MG/ML
40 INJECTION SUBCUTANEOUS DAILY
Status: DISCONTINUED | OUTPATIENT
Start: 2022-01-01 | End: 2022-01-01 | Stop reason: HOSPADM

## 2022-01-01 RX ORDER — IBUPROFEN 200 MG
1 TABLET ORAL DAILY
Status: DISCONTINUED | OUTPATIENT
Start: 2022-01-01 | End: 2022-01-01 | Stop reason: HOSPADM

## 2022-01-01 RX ORDER — LORAZEPAM 1 MG/1
2 TABLET ORAL
Status: COMPLETED | OUTPATIENT
Start: 2022-01-01 | End: 2022-01-01

## 2022-01-01 RX ORDER — HALOPERIDOL 5 MG/ML
INJECTION INTRAMUSCULAR
Status: DISPENSED
Start: 2022-01-01 | End: 2022-01-01

## 2022-01-01 RX ORDER — GADOTERATE MEGLUMINE 376.9 MG/ML
16 INJECTION INTRAVENOUS
Status: DISCONTINUED | OUTPATIENT
Start: 2022-01-01 | End: 2022-01-01

## 2022-01-01 RX ORDER — SODIUM CHLORIDE 9 MG/ML
75 INJECTION, SOLUTION INTRAVENOUS ONCE
Status: COMPLETED | OUTPATIENT
Start: 2022-01-01 | End: 2022-01-01

## 2022-01-01 RX ORDER — SODIUM CHLORIDE 0.9 % (FLUSH) 0.9 %
5-40 SYRINGE (ML) INJECTION AS NEEDED
Status: DISCONTINUED | OUTPATIENT
Start: 2022-01-01 | End: 2022-01-01 | Stop reason: HOSPADM

## 2022-01-01 RX ADMIN — QUETIAPINE FUMARATE 25 MG: 25 TABLET ORAL at 17:38

## 2022-01-01 RX ADMIN — Medication 10 ML: at 06:58

## 2022-01-01 RX ADMIN — ENOXAPARIN SODIUM 40 MG: 100 INJECTION SUBCUTANEOUS at 08:50

## 2022-01-01 RX ADMIN — Medication 10 ML: at 07:03

## 2022-01-01 RX ADMIN — LORAZEPAM 2 MG: 2 INJECTION INTRAMUSCULAR; INTRAVENOUS at 15:21

## 2022-01-01 RX ADMIN — Medication 10 ML: at 23:33

## 2022-01-01 RX ADMIN — GADOTERATE MEGLUMINE 15 ML: 376.9 INJECTION INTRAVENOUS at 16:37

## 2022-01-01 RX ADMIN — Medication 3 UNITS: at 17:58

## 2022-01-01 RX ADMIN — Medication 10 ML: at 06:00

## 2022-01-01 RX ADMIN — QUETIAPINE FUMARATE 25 MG: 25 TABLET ORAL at 09:20

## 2022-01-01 RX ADMIN — ENOXAPARIN SODIUM 40 MG: 100 INJECTION SUBCUTANEOUS at 09:40

## 2022-01-01 RX ADMIN — Medication 5 UNITS: at 12:34

## 2022-01-01 RX ADMIN — INSULIN GLARGINE 5 UNITS: 100 INJECTION, SOLUTION SUBCUTANEOUS at 23:32

## 2022-01-01 RX ADMIN — Medication 2 UNITS: at 16:30

## 2022-01-01 RX ADMIN — Medication 3 UNITS: at 16:45

## 2022-01-01 RX ADMIN — Medication 10 ML: at 23:52

## 2022-01-01 RX ADMIN — QUETIAPINE FUMARATE 25 MG: 25 TABLET ORAL at 17:49

## 2022-01-01 RX ADMIN — Medication 3 UNITS: at 22:00

## 2022-01-01 RX ADMIN — Medication 2 UNITS: at 08:51

## 2022-01-01 RX ADMIN — LORAZEPAM 2 MG: 2 INJECTION INTRAMUSCULAR; INTRAVENOUS at 15:37

## 2022-01-01 RX ADMIN — ENOXAPARIN SODIUM 40 MG: 100 INJECTION SUBCUTANEOUS at 08:25

## 2022-01-01 RX ADMIN — LORAZEPAM 2 MG: 2 INJECTION INTRAMUSCULAR; INTRAVENOUS at 08:25

## 2022-01-01 RX ADMIN — Medication 2 UNITS: at 23:17

## 2022-01-01 RX ADMIN — LORAZEPAM 1 MG: 2 INJECTION INTRAMUSCULAR; INTRAVENOUS at 02:00

## 2022-01-01 RX ADMIN — LORAZEPAM 1 MG: 2 INJECTION INTRAMUSCULAR; INTRAVENOUS at 03:27

## 2022-01-01 RX ADMIN — Medication 10 ML: at 16:44

## 2022-01-01 RX ADMIN — Medication 5 UNITS: at 17:38

## 2022-01-01 RX ADMIN — Medication 10 ML: at 22:48

## 2022-01-01 RX ADMIN — ENOXAPARIN SODIUM 40 MG: 100 INJECTION SUBCUTANEOUS at 08:24

## 2022-01-01 RX ADMIN — LORAZEPAM 1 MG: 2 INJECTION INTRAMUSCULAR; INTRAVENOUS at 21:02

## 2022-01-01 RX ADMIN — QUETIAPINE FUMARATE 25 MG: 25 TABLET ORAL at 08:52

## 2022-01-01 RX ADMIN — DIPHENHYDRAMINE HYDROCHLORIDE 50 MG: 50 INJECTION, SOLUTION INTRAMUSCULAR; INTRAVENOUS at 22:36

## 2022-01-01 RX ADMIN — Medication 10 ML: at 17:54

## 2022-01-01 RX ADMIN — Medication 5 UNITS: at 12:41

## 2022-01-01 RX ADMIN — Medication 2 UNITS: at 13:21

## 2022-01-01 RX ADMIN — Medication 2 UNITS: at 13:11

## 2022-01-01 RX ADMIN — Medication 10 ML: at 18:42

## 2022-01-01 RX ADMIN — Medication 5 UNITS: at 16:14

## 2022-01-01 RX ADMIN — ENOXAPARIN SODIUM 40 MG: 100 INJECTION SUBCUTANEOUS at 08:13

## 2022-01-01 RX ADMIN — Medication 3 MG: at 23:32

## 2022-01-01 RX ADMIN — SODIUM CHLORIDE 75 ML/HR: 9 INJECTION, SOLUTION INTRAVENOUS at 04:30

## 2022-01-01 RX ADMIN — Medication 3 MG: at 21:20

## 2022-01-01 RX ADMIN — LORAZEPAM 2 MG: 2 INJECTION INTRAMUSCULAR; INTRAVENOUS at 19:44

## 2022-01-01 RX ADMIN — Medication 3 UNITS: at 09:20

## 2022-01-01 RX ADMIN — Medication 2 UNITS: at 08:25

## 2022-01-01 RX ADMIN — SODIUM CHLORIDE 100 ML/HR: 9 INJECTION, SOLUTION INTRAVENOUS at 18:51

## 2022-01-01 RX ADMIN — Medication 3 UNITS: at 11:56

## 2022-01-01 RX ADMIN — ARFORMOTEROL TARTRATE: 15 SOLUTION RESPIRATORY (INHALATION) at 07:54

## 2022-01-01 RX ADMIN — Medication 5 UNITS: at 18:07

## 2022-01-01 RX ADMIN — ENOXAPARIN SODIUM 40 MG: 100 INJECTION SUBCUTANEOUS at 09:34

## 2022-01-01 RX ADMIN — LORAZEPAM 2 MG: 2 INJECTION INTRAMUSCULAR at 19:44

## 2022-01-01 RX ADMIN — INSULIN GLARGINE 5 UNITS: 100 INJECTION, SOLUTION SUBCUTANEOUS at 21:43

## 2022-01-01 RX ADMIN — QUETIAPINE FUMARATE 25 MG: 25 TABLET ORAL at 23:42

## 2022-01-01 RX ADMIN — ENOXAPARIN SODIUM 40 MG: 100 INJECTION SUBCUTANEOUS at 08:18

## 2022-01-01 RX ADMIN — Medication 2 UNITS: at 08:24

## 2022-01-01 RX ADMIN — Medication 10 ML: at 23:41

## 2022-01-01 RX ADMIN — Medication 10 ML: at 21:44

## 2022-01-01 RX ADMIN — Medication 10 ML: at 23:57

## 2022-01-01 RX ADMIN — QUETIAPINE FUMARATE 25 MG: 25 TABLET ORAL at 21:20

## 2022-01-01 RX ADMIN — Medication 2 UNITS: at 08:13

## 2022-01-01 RX ADMIN — Medication 10 ML: at 14:00

## 2022-01-01 RX ADMIN — Medication 3 MG: at 21:43

## 2022-01-01 RX ADMIN — Medication 10 ML: at 05:09

## 2022-01-01 RX ADMIN — SODIUM CHLORIDE 50 ML/HR: 9 INJECTION, SOLUTION INTRAVENOUS at 18:42

## 2022-01-01 RX ADMIN — HALOPERIDOL LACTATE 5 MG: 5 INJECTION, SOLUTION INTRAMUSCULAR at 19:52

## 2022-01-01 RX ADMIN — SODIUM CHLORIDE 100 ML/HR: 9 INJECTION, SOLUTION INTRAVENOUS at 05:55

## 2022-01-01 RX ADMIN — Medication 3 MG: at 23:39

## 2022-01-01 RX ADMIN — ENOXAPARIN SODIUM 40 MG: 100 INJECTION SUBCUTANEOUS at 08:43

## 2022-01-01 RX ADMIN — Medication 3 UNITS: at 22:47

## 2022-01-01 RX ADMIN — QUETIAPINE FUMARATE 25 MG: 25 TABLET ORAL at 17:58

## 2022-01-01 RX ADMIN — LORAZEPAM 2 MG: 1 TABLET ORAL at 21:31

## 2022-01-01 RX ADMIN — ENOXAPARIN SODIUM 40 MG: 100 INJECTION SUBCUTANEOUS at 09:20

## 2022-01-01 RX ADMIN — LORAZEPAM 1 MG: 2 INJECTION INTRAMUSCULAR; INTRAVENOUS at 18:11

## 2022-01-01 RX ADMIN — QUETIAPINE FUMARATE 25 MG: 25 TABLET ORAL at 11:15

## 2022-01-01 RX ADMIN — LORAZEPAM 1 MG: 2 INJECTION INTRAMUSCULAR; INTRAVENOUS at 21:50

## 2022-01-01 RX ADMIN — LORAZEPAM 2 MG: 2 INJECTION INTRAMUSCULAR; INTRAVENOUS at 21:20

## 2022-01-01 RX ADMIN — Medication 10 ML: at 15:43

## 2022-01-01 RX ADMIN — ACETAMINOPHEN 650 MG: 325 TABLET ORAL at 16:44

## 2022-01-12 PROBLEM — R41.82 AMS (ALTERED MENTAL STATUS): Status: ACTIVE | Noted: 2022-01-01

## 2022-01-12 NOTE — ED TRIAGE NOTES
Patient ambulated from the lobby to room 5 with the charge nurse. Patient very talkative and laughing. The family wrote down a complaint of confusion. He knows his name and the area that he lives in and his birthday. Wife says that the symptoms started a couple of days ago but got worse today. Wife is tearful now, states that she is scared of him, verbal threats last night. Discussing with the ER doctor now.

## 2022-01-13 NOTE — ED NOTES
Patient  Very agitated now, climbing out of the bed and has urinated on the floor. Stripped bedding and placed fresh linens; new diaper placed and pulled up and repositioned in the bed.  Dr Serge Leon aware and now 4 nurses at the bedside

## 2022-01-13 NOTE — PROGRESS NOTES
Occupational therapy note:  Orders received, chart reviewed. Per RN patient minimally interactive and not following commands. Patient remains in wrist restraints. Will follow up with patient tomorrow to attempt OT evaluation. Hiral Flores MS OTR/L
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
## 2022-01-13 NOTE — ROUTINE PROCESS
Bedside shift change report given to [facility] (oncoming nurse) by Fede Brandon (offgoing nurse). Report included the following information SBAR, Kardex, Intake/Output, MAR, Accordion and Recent Results.

## 2022-01-13 NOTE — ED NOTES
Patient tolerated iv ativan well; 2 nurses at bedside. Patient pulling at things and starting to say \"nonsense words\" per the wife.

## 2022-01-13 NOTE — ED NOTES
Wife has gone home for the night. She has phone numbers for us and [facility] to get verbal updates. Patient now resting on the bed with a blanket covering him. Tolerated oral medication well.

## 2022-01-13 NOTE — PROGRESS NOTES
1/13/2022   12:15 PM  CM received call back for spouse, she is on the way to [facility], will meet w/ her when she arrives. KARTIK He       11:07 AM  Pt admitted w/ AMS, dementia at baseline,CM attempted to complete assessment w/ spouse Carlota Rubio to home number no answer, called mobile, LVM will attempt again.   KARTIK He

## 2022-01-13 NOTE — PROGRESS NOTES
Attempted to call patient's wife twice. No answer on cell phone or home phone. Left message requesting she call me back on her cell phone. 1830: Was able to connect with pt's wife. Was updated on current course and tentative plans.     DO EUSEBIO [facility]

## 2022-01-13 NOTE — ED NOTES
Bedside shift change report given to vinh ruiz  (oncoming nurse) by vinh wesley  (offgoing nurse). Report included the following information SBAR.

## 2022-01-13 NOTE — PROGRESS NOTES
Palliative Medicine  [place]: [phone]  [facility], located within [facility]: [phone] ([phone])        Code Status: Full Code    Advance Care Planning:  Advance Care Planning 4/2/2020   Confirm Advance Directive Yes, on file   LNOK:   Primary Decision Maker: Julia Gilbert - [phone]      Patient / Family Encounter Documentation    Participants (names): Patient  Nakita Lam [facility]    Narrative:     I spoke with Latonia Long on phone. We discussed weekend events - he expressed frustration at being asked to make patient a DNR after a tough night Saturday. He is appropriately frustrated and overwhelmed given visiting restrictions, being quarantined and not being able to support wife in the way she has supported him in past with his health problems. Latonia Long is adamant that he wants to do everything to keep patient alive right now including having her remain a full code. He expressed disbelief that DNR is even an option and we took some time to discuss (hypothetically) when DNRs are appropriate and why this question came up again. He expressed anger and emotional \"rawness\" and I suggested a family meeting with daughter Kaz Coffman so they can get a big picture update at the same time and ask questions. Latonia Long is going back to work tomorrow and would like to be with daughter during meeting. He was tentative for a phone meeting Wed at 2pm. Will call Tuesday morning to confirm. Goals of Care / Plan:     Family meeting with  and daughter tentatively Wed 2 pm.      Thank you for the opportunity to be involved in the care of Ms. Ginger Mayfield and her family.     Ricky Marti LMSW, Supervisee in Social Work  Palliative Medicine   [phone]    Start time: 9:55  End time: 10:25 Physical Therapy  Patient continues to be lethargic and minimally interactive and unable to follow commands. Spoke with nurse. Patient is also currently still in wrist restraints. We will defer and re-attempt tomorrow.   Thank you  America Lundberg PT,DPT,NCS
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
## 2022-01-13 NOTE — ED NOTES
Patient pulled out his right ac iv lock; bandaid applied to site to control bleeding.  Dr Bebeto Perez aware and writing more orders
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
## 2022-01-13 NOTE — ED NOTES
Patient talking out loud to no one, trying to get the side rails down, very active. Needing frequent reassurance. Wife aware of situation at bedside.

## 2022-01-13 NOTE — PROGRESS NOTES
Family medicine notified that patient isn't following any commands, and per stand his garbled speech and vocal quality he does not meet bedside swallow evaluation, and would need speech therapy to see him.
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
## 2022-01-13 NOTE — PROGRESS NOTES
Patient is completely disoriented in bed with wrist restraints in place. FP at bedside assessing patient.

## 2022-01-14 NOTE — PROGRESS NOTES
Spiritual Care Assessment/Progress Note  [address]      NAME: Hyacinth Brown      MRN: [id number]  AGE: 68 y.o. SEX: male  Baptist Affiliation: Taoism   Language: English     1/14/2022     Total Time (in minutes): 18     Spiritual Assessment begun in [facility] 5M1 MED SURG 1 through conversation with:         [x]Patient        [x] Family    [] Friend(s)        Reason for Consult: Palliative Care, Initial/Spiritual Assessment     Spiritual beliefs: (Please include comment if needed)     [x] Identifies with a jadon tradition:         [] Supported by a jadon community:            [] Claims no spiritual orientation:           [] Seeking spiritual identity:                [] Adheres to an individual form of spirituality:           [] Not able to assess:                           Identified resources for coping:      [] Prayer                               [] Music                  [] Guided Imagery     [x] Family/friends                 [] Pet visits     [] Devotional reading                         [] Unknown     [] Other:                                              Interventions offered during this visit: (See comments for more details)    Patient Interventions: Affirmation of emotions/emotional suffering     Family/Friend(s):  Affirmation of emotions/emotional suffering,Affirmation of jadon,Catharsis/review of pertinent events in supportive environment,Coping skills reviewed/reinforced,Iconic (affirming the presence of God/Higher Power),Normalization of emotional/spiritual concerns,Prayer (actual)     Plan of Care:     [] Support spiritual and/or cultural needs    [] Support AMD and/or advance care planning process      [] Support grieving process   [] Coordinate Rites and/or Rituals    [] Coordination with community clergy   [] No spiritual needs identified at this time   [] Detailed Plan of Care below (See Comments)  [] Make referral to Music Therapy  [] Make referral to Pet Therapy     [] Make referral to Addiction services  [] Make referral to [facility]  [] Make referral to Spiritual Care Partner  [] No future visits requested        [x] Follow up visits as needed     Visited pt for palliative initial spiritual assessment. Pt's wife Finn Glynn was at bedside. Pt did not recognize his wife at time of visit. He was unable to engage a conversation. Finn Glynn spoke of their 52 year marriage and the pain of witnessing his cognitive decline. She has concerns about her safety at home and is considering options for the pt's care going forward. The couple have two daughters, one of whom is an NP. Her family is supportive of her.    Chaplain Val, MDiv, MS, [facility]

## 2022-01-14 NOTE — PROGRESS NOTES
Problem: Mobility Impaired (Adult and Pediatric)  Goal: *Acute Goals and Plan of Care (Insert Text)  Description: FUNCTIONAL STATUS PRIOR TO ADMISSION: Patient was modified independent  for functional mobility up to 2days PTA    HOME SUPPORT PRIOR TO ADMISSION: The patient lived with wife but did not require assist per wife. Pt did not bathe in shower. Per chart review,. Lawernce Roughen Lawernce Roughen Dtr stating that pt was confused at time and urinating in corner of rooms. Physical Therapy Goals  Initiated 1/14/2022  1. Patient will move from supine to sit and sit to supine  in bed with supervision/set-up within 7 day(s). 2.  Patient will transfer from bed to chair and chair to bed with minimal assistance/contact guard assist using the least restrictive device within 7 day(s). 3.  Patient will perform sit to stand with minimal assistance/contact guard assist within 7 day(s). 4.  Patient will ambulate with minimal assistance/contact guard assist for 100 feet with the least restrictive device within 7 day(s). 5.  Patient will ascend/descend 2 stairs with 2 handrail(s) with minimal assistance/contact guard assist within 7 day(s). Outcome: Progressing Towards Goal   PHYSICAL THERAPY EVALUATION  Patient: Aimee Wolf (89 y.o. male)  Date: 1/14/2022  Primary Diagnosis: AMS (altered mental status) [R41.82]        Precautions:   Fall    ASSESSMENT  Based on the objective data described below, the patient presents with admission due to AMS/agitation/agression. Restraints applied. Pt with advanced Alz Dementia. Chart stating that pt has threatened wife on many occasions. He was ambulatory 2 days PTA without use of asst device. Dtr stating that pt has urinated in corner of rooms at times due to dementia. Pt received supine in bed with BUE restraints in place. Wife present and confirming that pt is much more calm and cooperative today. Pt pleasant and exhibiting word finding issues.   Able to follow functional commands to get to EOB.  Sit to stand with HHAx1 and Min A.  Brief donned in standing of which pt initiating assitance to apply. Attempted gait of 5\" forward and retro, yet pt ataxic and needing a strong Mod Ax2 with this. Returned to supine with Min A x2. Lunch tray set up. Pt able to feed self with RUE. LUE restraint replaced. RN informed that pt in need of R restrained re-applied. Activity blanket provieded for pt to use. SNF recommended for this pt to address findings. Unpredictability of advanced nature of Alz dz requiring 24hr care. Current Level of Function Impacting Discharge (mobility/balance): Mod Ax2/fair to poor    Functional Outcome Measure: The patient scored 25/100 on the barthel outcome measure   Other factors to consider for discharge: per above      Patient will benefit from skilled therapy intervention to address the above noted impairments. PLAN :  Recommendations and Planned Interventions: bed mobility training, transfer training, gait training, therapeutic exercises, and neuro mus re-ed, edema control, fam ed. Frequency/Duration: Patient will be followed by physical therapy:  5 times a week to address goals. Recommendation for discharge: (in order for the patient to meet his/her long term goals)  Therapy up to 5 days/week in SNF setting    This discharge recommendation:  Has been made in collaboration with the attending provider and/or case management    IF patient discharges home will need the following DME: to be determined (TBD)         SUBJECTIVE:   Patient stated I thank you.     OBJECTIVE DATA SUMMARY:   HISTORY:    Past Medical History:   Diagnosis Date    COPD (chronic obstructive pulmonary disease) ([facility] [address]     Diabetes ([facility] [address]     Endocrine disease     Essential hypertension     High cholesterol     Osteoarthritis     Skin cancer     Skin disorder     Sun-damaged skin     Sunburn, blistering      Past Surgical History:   Procedure Laterality Date    HX CERVICAL LAMINECTOMY      HX CHOLECYSTECTOMY      HX LUMBAR LAMINECTOMY      HX ORTHOPAEDIC Bilateral     hands and shoulders    HX TONSILLECTOMY         Personal factors and/or comorbidities impacting plan of care: per above and below    Home Situation  Home Environment: Private residence  # Steps to Enter: 2  Rails to Enter: Yes  One/Two Story Residence: Two story  Living Alone: No  Support Systems: Spouse/Significant Other  Tub or Shower Type: Shower    EXAMINATION/PRESENTATION/DECISION MAKING:   Critical Behavior:  Neurologic State: Alert  Orientation Level: Oriented to person  Cognition: Decreased command following,Decreased attention/concentration  Safety/Judgement: Lack of insight into deficits  Hearing:     Skin:  IV; condom cath  Edema:  WNL  Range Of Motion:  AROM: Generally decreased, functional                       Strength:    Strength: Generally decreased, functional                    Tone & Sensation:   Tone: Normal              Sensation: Impaired (numbness in feet)               Coordination:  Coordination: Generally decreased, functional  Vision:      Functional Mobility:  Bed Mobility:     Supine to Sit: Minimum assistance  Sit to Supine: Minimum assistance  Scooting: Minimum assistance  Transfers:  Sit to Stand: Minimum assistance;Assist x1;Additional time  Stand to Sit: Minimum assistance;Assist x1;Additional time        Bed to Chair: Minimum assistance              Balance:   Sitting: Intact; Without support  Standing: Impaired; Without support  Standing - Static: Constant support; Fair  Standing - Dynamic : Constant support; Fair  Ambulation/Gait Training:  Distance (ft): 5 Feet (ft)  Assistive Device: Other (comment) (HHAx2)  Ambulation - Level of Assistance: Moderate assistance;Assist x2        Gait Abnormalities: Ataxic;Decreased step clearance; Path deviations        Base of Support: Narrowed; Center of gravity altered     Speed/Charissa: Slow;Pace decreased (<100 feet/min)  Step Length: Right shortened;Left shortened Functional Measure:  Barthel Index:    Bathin  Bladder: 0  Bowels: 0  Groomin  Dressin  Feedin  Mobility: 0  Stairs: 0  Toilet Use: 5  Transfer (Bed to Chair and Back): 10  Total: 25/100       The Barthel ADL Index: Guidelines  1. The index should be used as a record of what a patient does, not as a record of what a patient could do. 2. The main aim is to establish degree of independence from any help, physical or verbal, however minor and for whatever reason. 3. The need for supervision renders the patient not independent. 4. A patient's performance should be established using the best available evidence. Asking the patient, friends/relatives and nurses are the usual sources, but direct observation and common sense are also important. However direct testing is not needed. 5. Usually the patient's performance over the preceding 24-48 hours is important, but occasionally longer periods will be relevant. 6. Middle categories imply that the patient supplies over 50 per cent of the effort. 7. Use of aids to be independent is allowed. Score Interpretation (from [address] [address])    Independent   60-79 Minimally independent   40-59 Partially dependent   20-39 Very dependent   <20 Totally dependent     -Savana Dudley., Barthel, D.W. (1965). Functional evaluation: the Barthel Index. [address] [facility] ([address] (1997). The Barthel activities of daily living index: self-reporting versus actual performance in the old (> or = 75 years). Journal [address] 45(7), 14 [facility], J.J.M.F, Bari العلي., Amber Orellana. (1999). Measuring the change in disability after inpatient rehabilitation; comparison of the responsiveness of the Barthel Index and Functional [place] Measure. Journal of Neurology, Neurosurgery, and Psychiatry, 66(4), 312-134.   -DOV Morin.MAGALI.LEXA, GEOVANNA Mora, Tony Rodriguez M.A. (2004) Assessment of post-stroke quality of life in cost-effectiveness studies: The usefulness of the Barthel Index and the EuroQoL-5D. Quality of Life Research, 15, 360-86           Physical Therapy Evaluation Charge Determination   History Examination Presentation Decision-Making   MEDIUM  Complexity : 1-2 comorbidities / personal factors will impact the outcome/ POC  MEDIUM Complexity : 3 Standardized tests and measures addressing body structure, function, activity limitation and / or participation in recreation  MEDIUM Complexity : Evolving with changing characteristics  Other outcome measures barthel  HIGH       Based on the above components, the patient evaluation is determined to be of the following complexity level: MEDIUM    Pain Rating:  none    Activity Tolerance:   Fair    After treatment patient left in no apparent distress:   Supine in bed, Call bell within reach, Bed / chair alarm activated, Caregiver / family present, and Side rails x 3    COMMUNICATION/EDUCATION:   The patients plan of care was discussed with: Occupational therapist, Registered nurse, and Case management. Fall prevention education was provided and the patient/caregiver indicated understanding., Patient/family have participated as able in goal setting and plan of care., Patient/family agree to work toward stated goals and plan of care. , and Patient is unable to participate in goal setting and plan of care.     Thank you for this referral.  Larisa Messer, PT   Time Calculation: 28 mins

## 2022-01-14 NOTE — PROGRESS NOTES
Problem: Dysphagia (Adult)  Goal: *Acute Goals and Plan of Care (Insert Text)  Description: Swallowing goals initiated 1-14-22:  1) tolerate regular diet, thin liquids without s/s aspiration by 1-17-22 1/14/2022 0957 by LIMA Putnam  Outcome: Progressing Towards Goal  1/14/2022 0956 by Sotero Cross SLP  Outcome: Progressing Towards Goal   [address] EVALUATION  Patient: Rupert Hernandez (59 y.o. male)  Date: 1/14/2022  Primary Diagnosis: AMS (altered mental status) [R41.82]        Precautions: aspiration       ASSESSMENT :  Based on the objective data described below, the patient presents with functional swallow. His throat was extremely dry and covered in dried blood at start of evaluation. Cleared with PO boluses. Patient was in (B) hand restraints and was frustrated that he could not feed self. He may benefit from sitter to reduce his agitation, provide redirection. He appeared to enjoy engaging with people. Admitted 1-12-22 with worsening confusion and agitation. Head CT:  moderate central atrophy, CXR: negative. PMH: dementia 7-8 years. , DM<  OPD<  HTN< HLD, skin CA,  PUD,  tonsillectomy. Patient will benefit from skilled intervention to address the above impairments. Patients rehabilitation potential is considered to be Fair     PLAN :  Recommendations and Planned Interventions:  Start regular diet, thin liquids. Regular diet will allow the most finger foods to enable him to self feed. If he is having trouble swallowing, then consider downgrade to purees, thins  Frequency/Duration: Patient will be followed by speech-language pathology 2 times a week to address goals. Discharge Recommendations: memory care center     SUBJECTIVE:   Patient stated wooooooooooo, woooooooooo.  .    OBJECTIVE:     Past Medical History:   Diagnosis Date    COPD (chronic obstructive pulmonary disease) ([facility] [address]     Diabetes ([facility] [address]     Endocrine disease     Essential hypertension     High cholesterol     Osteoarthritis     Skin cancer     Skin disorder     Sun-damaged skin     Sunburn, blistering      Past Surgical History:   Procedure Laterality Date    HX CERVICAL LAMINECTOMY      HX CHOLECYSTECTOMY      HX LUMBAR LAMINECTOMY      HX ORTHOPAEDIC Bilateral     hands and shoulders    HX TONSILLECTOMY       Prior Level of Function/Home Situation:   Home Situation  Support Systems: Spouse/Significant Other,Child(abril) (pt lives w/ spouse in t residence, at baseline pt is ambulatory w/ cane, can bathe and dress himeself, spouse assists w/ medications and transport)  Diet prior to admission: regular, thins  Current Diet:  NPO   Cognitive and Communication Status:  Neurologic State: Eyes open spontaneously  Orientation Level: Oriented to person,Disoriented to place,Disoriented to situation,Disoriented to time  Cognition:  (confused. not following commands. easily startled)     Perseveration: No perseveration noted  Safety/Judgement: Lack of insight into deficits  Oral Assessment:  Oral Assessment  Labial:  (masked)  Dentition: Natural;Limited  Oral Hygiene: WFL  Lingual: No impairment  Velum:  (the hard and soft palate were covered with dried blood that cleared with additional PO)  P.O. Trials:  Patient Position: upright in bed  Vocal quality prior to P.O.: No impairment  Consistency Presented: Solid;Puree; Thin liquid  How Presented: SLP-fed/presented;Spoon;Straw;Successive swallows   ORAL PHASE:   Bolus Acceptance: Impaired (he would only take 1 bite of cracker)  Bolus Formation/Control: No impairment     Propulsion: No impairment  Oral Residue: None  PHARYNGEAL PHASE:   Initiation of Swallow: No impairment  Laryngeal Elevation: Functional  Aspiration Signs/Symptoms:  (cough x1 after he drank water after cookie)                        NOMS:   The NOMS functional outcome measure was used to quantify this patient's level of swallowing impairment.   Based on the NOMS, the patient was determined to be at level 5 for swallow function       NOMS Swallowing Levels:  Level 1 (CN): NPO  Level 2 (CM): NPO but takes consistency in therapy  Level 3 (CL): Takes less than 50% of nutrition p.o. and continues with nonoral feedings; and/or safe with mod cues; and/or max diet restriction  Level 4 (CK): Safe swallow but needs mod cues; and/or mod diet restriction; and/or still requires some nonoral feeding/supplements  Level 5 (CJ): Safe swallow with min diet restriction; and/or needs min cues  Level 6 (CI): Independent with p.o.; rare cues; usually self cues; may need to avoid some foods or needs extra time  Level 7 ([address] [place] [address]): Independent for all p.o.  SOFI. (2003). National Outcomes Measurement System (NOMS): Adult Speech-Language Pathology User's Guide. Pain:  Pain Scale 1: Adult Nonverbal Pain Scale  Pain Intensity 1: 0       After treatment:   Patient left in no apparent distress in bed and Nursing notified    COMMUNICATION/EDUCATION:   Patient was educated regarding his deficit(s) of DYSPHAGIA  as this relates to his diagnosis of DEMENTIA. He demonstrated Poor - Terminal understanding as evidenced by DEMENTIA, AMS. The patient's plan of care including recommendations, planned interventions, and recommended diet changes were discussed with: Registered nurse. Patient is unable to participate in goal setting and plan of care.     Thank you for this referral.  Issac Daly, SLP

## 2022-01-14 NOTE — PROGRESS NOTES
Neurology Progress Note    Patient ID:  Solomon Valencia  [id number]  68 y.o.  1945    CC: dementia and behavior issues    Subjective:      Patient's condition remains the same. Baseline dementia with periods of confusion and agitation. Brain MRI did not reveal any acute process. Predominant frontotemporal atrophy. No abnormal enhancement. Brain MRA with did not reveal any flow-limiting stenosis or aneurysm. Current Facility-Administered Medications   Medication Dose Route Frequency    sodium chloride (NS) flush 5-40 mL  5-40 mL IntraVENous Q8H    sodium chloride (NS) flush 5-40 mL  5-40 mL IntraVENous PRN    acetaminophen (TYLENOL) tablet 650 mg  650 mg Oral Q6H PRN    Or    acetaminophen (TYLENOL) suppository 650 mg  650 mg Rectal Q6H PRN    polyethylene glycol (MIRALAX) packet 17 g  17 g Oral DAILY PRN    ondansetron (ZOFRAN ODT) tablet 4 mg  4 mg Oral Q8H PRN    Or    ondansetron (ZOFRAN) injection 4 mg  4 mg IntraVENous Q6H PRN    enoxaparin (LOVENOX) injection 40 mg  40 mg SubCUTAneous DAILY    glucose chewable tablet 16 g  4 Tablet Oral PRN    dextrose (D50W) injection syrg 12.5-25 g  12.5-25 g IntraVENous PRN    glucagon (GLUCAGEN) injection 1 mg  1 mg IntraMUSCular PRN    albuterol (PROVENTIL VENTOLIN) nebulizer solution 2.5 mg  2.5 mg Nebulization Q6H PRN    arformoterol 15 mcg/budesonide 0.5 mg neb solution   Nebulization BID RT    insulin lispro (HUMALOG) injection   SubCUTAneous AC&HS    QUEtiapine (SEROquel) tablet 25 mg  25 mg Oral QHS    0.9% sodium chloride infusion  100 mL/hr IntraVENous CONTINUOUS    nicotine (NICODERM CQ) 14 mg/24 hr patch 1 Patch  1 Patch TransDERmal DAILY        Review of Systems:    Pertinent items are noted in HPI. Objective:     Patient Vitals for the past 8 hrs:   BP Temp Pulse Resp SpO2   01/14/22 0736 (!) 168/76 97.5 °F (36.4 °C) 77 18 96 %   01/14/22 0445 (!) 141/85 98.1 °F (36.7 °C) 99 18 95 %       No intake/output data recorded.   01/12 1901 - 01/14 0700  In: 0   Out: 300 [Urine:300]    Lab Review   Recent Results (from the past 24 hour(s))   GLUCOSE, POC    Collection Time: 01/13/22  6:34 PM   Result Value Ref Range    Glucose (POC) 99 65 - 117 mg/dL    Performed by [address] [place] [address] Sw, POC    Collection Time: 01/13/22 10:03 PM   Result Value Ref Range    Glucose (POC) 104 65 - 117 mg/dL    Performed by [facility]    GLUCOSE, POC    Collection Time: 01/14/22  6:38 AM   Result Value Ref Range    Glucose (POC) 134 (H) 65 - 117 mg/dL    Performed by [facility]      PHYSICAL EXAM:     NEUROLOGICAL EXAM:     Appearance: The patient is well developed, well nourished, unable to provide a coherent history and is in no acute distress. Mental Status: Oriented to person and place. Fluent, no aphasia or dysarthria. Poor memory. Intermittently follows commands. Talks nonsensically. Cranial Nerves:   Blinks to threat. MILAGRO, EOM's full, no nystagmus, no ptosis. Facial sensation is normal. Corneal reflexes are intact. Facial movement is symmetric. Hearing is grossly intact. Sternocleidomastoid and trapezius muscles are normal. Tongue is midline. Motor:  5/5 strength in upper and lower proximal and distal muscles. Normal bulk and tone. Reflexes:   Deep tendon reflexes 1+/4 and symmetrical. Downgoing toes. Sensory:   Normal to noxious. Gait:  Not tested. Tremor:   No tremor noted. Cerebellar:  Unable to do. Assessment:   Alzheimer's dementia with behavioral issues    Plan:   Neurological examination reveals significant cognitive impairment typically seen in dementia with no focal findings. Consider Alzheimer's dementia with emerging behavioral issues. No clear evidence currently as a reason for abrupt deterioration in behavior changes per laboratory work-up. Likely related to disease progression.     Head CT without contrast did not reveal any acute process.   Brain MRI with and without contrast revealed no acute process. Predominantly frontotemporal atrophy. No abnormal enhancement. Patient likely has frontotemporal dementia which is predisposed to psychosis and delirium. Brain MRA did not reveal any flow-limiting stenosis or aneurysm. EEG was ordered and attempted and could not be done due to patient's agitated state.     Recommend trial of antipsychotics to control patient's behavior since patient has been cleared by speech therapy.      When able resume home medications for dementia and depression.     No further recommendations from a neurological standpoint.     Signed:  Bari Scanlon MD  1/14/2022  11:24 AM

## 2022-01-14 NOTE — PROGRESS NOTES
Bedside and Verbal shift change report given to Carlos A Garibay RN (oncoming nurse) by Nicki Ohara RN (offgoing nurse). Report included the following information SBAR, Kardex, Intake/Output, MAR, Accordion and Recent Results.

## 2022-01-14 NOTE — PROGRESS NOTES
Restraint type: Soft restraint:  right wrist and left wrist  Reason for restraints: Interference with medical equipment or treatment  Duration: 24 hours    Restraints must be removed when an alternative is available and effective and/or patient no longer meets criteria. Orders must be renewed every calendar day or when discontinued.     MD Ezra Asif [facility] Family Medicine Resident

## 2022-01-14 NOTE — PROGRESS NOTES
1600: Family medicine notified that patient is becoming more restless. She stated to give the evening dose of scheduled Seroquel now.

## 2022-01-14 NOTE — PROGRESS NOTES
Palliative Medicine team attempted visit; Physician is currently at bedside speaking with family. Will attempt follow up at later time.

## 2022-01-14 NOTE — PROGRESS NOTES
1910 - Verbal shift change report given to [address] [place] [address] (oncoming nurse) by Shaina Vieira (offgoing nurse). Report included the following information SBAR, ED Summary, Procedure Summary, Intake/Output, MAR and Dual Neuro Assessment.

## 2022-01-14 NOTE — PROGRESS NOTES
[address] [place] [address]   Office [phone]  Fax [phone]          Assessment and Plan     Aimee Wolf is a 68 y.o. male with a PMHx of Alzheimer's disease, DM2, HTN, HLD, COPD, skin cancer who is admitted for workup for altered mental status. He has spent 2 night(s) in the hospital.    24 Hour Events: Pt continued on soft restraints overnight. Altered mental status in setting of Alzheimer's dementia: Patient with longstanding history of Alzheimer's dementia with acute and sudden onset of AMS with aggressive behavior towards wife. CT head n/a/p. MRI/MRA w/out major occlusion or stroke, reported \"central atrophy c/w known L2 mets\". Lab work, including volatiles, ammonia, TSH, UDS, EtOH, CBC, BMP all unremarkable. Echo w/ LVEF 55-60% w/ normal sys fxn and diastolic dysfxn. - Neuro consulted, appreciate recs   - Will f/u w/ radiologist this afternoon for clarification  - Zyprexa 5mg oral dissolving tab vs Seroquel PO 25 mg daily for agitation   - Choice depends on swallowing ability, evaluated by speech   - PT/OT/CM consulted, appreciate recs  - NPO until passes bedside swallow  - Bilateral wrist restraints ordered     Elevated troponin: POA troponin of 10. Low concern for ACS given patient history. Repeat 14.      UA positive for blood: POA UA with moderate blood, however has 5-10 RBC. Concern for rhabdomyolysis vs underlying malignancy (bladder vs RCC). CK mildly elevated at 415.  - Continuous IVF, NS at 100 ml/hr     Elevated bilirubin: Unclear etiology. In past, has had elevated TB up to 2.8. Pt with h/o cholecystectomy. Indirect bili 1.5, direct 0.3.   - Daily CMP     DM2: Chronic, stable. POA A1c 6.7. Not on meds, per wife. - ACHS SSI, normal sensitivity, w/ POC glucose checks  - Hypoglycemia protocols     Hypertension: Chronic, stable. Not on meds per wife. - BP on admission 116/74  - Continue to monitor and adjust as BP's trend     COPD: Chronic, stable.  Per wife, using Symbicort and ProAir PRN, with increased use recently. Infection less likely given pro-jess <0.05.  - Continue home Symbicort  - Albuterol PRN     HLD: Chronic, stable. POA lipids , HDL 55, , . Not on meds  - Consider outpt statin therapy     MDD/DENISE: Chronic, stable. Not on meds, per pt wife, on Zoloft in past - however pt's wife said he d/c'ed use after not noticing much of a difference.           FEN/GI - NPO. (Speech to see)  Activity - Ambulate with assistance. Fall precautions. DVT prophylaxis - Lovenox  GI prophylaxis - Not indicated at this time  Fall prophylaxis - Fall precautions ordered. Disposition - Admit to Medical. Plan to d/c to Assisted Living vs SNF. Consulting PT, OT and CM  Code Status - Full. Discussed with patient / caregivers. Next of Kin Name and [address] Ortiz,  Spouse - [phone]      I appreciate the opportunity to participate in the care of this patient,  Dustin Chavez MD  [facility] Medicine Resident         Subjective / Objective     Subjective: Pt not fully capable of conversing this morning. Pt is not agitated. He does know his name but is still confused. He denies being in any pain or discomfort. Temp (24hrs), Av.7 °F (36.5 °C), Min:97.5 °F (36.4 °C), Max:98.1 °F (36.7 °C)     Physical Exam  Constitutional:       General: He is not in acute distress. Eyes:      Extraocular Movements: Extraocular movements intact. Conjunctiva/sclera: Conjunctivae normal.   Cardiovascular:      Rate and Rhythm: Normal rate and regular rhythm. Pulses: Normal pulses. Heart sounds: Normal heart sounds. Pulmonary:      Effort: Pulmonary effort is normal.      Breath sounds: Normal breath sounds. Abdominal:      General: Abdomen is flat. Palpations: Abdomen is soft. Musculoskeletal:      Right lower leg: No edema. Left lower leg: No edema. Skin:     General: Skin is warm and dry.       Capillary Refill: Capillary refill takes less than 2 seconds. Neurological:      General: No focal deficit present. Mental Status: He is alert. He is disoriented and confused. Comments: Oriented to self only. Psychiatric:         Behavior: Behavior is not agitated or aggressive. Behavior is cooperative. Cognition and Memory: Cognition is impaired. Memory is impaired.         Respiratory:   O2 Device: None (Room air)     I/O:  Date 01/13/22 0700 - 01/14/22 0659 01/14/22 0700 - 01/15/22 0659   Shift 0700-1859 1900-0659 24 Hour Total 0700-1859 1900-0659 24 Hour Total   INTAKE   I.V.(mL/kg/hr)  0(0) 0(0)        Volume (0.9% sodium chloride infusion)  0 0      Shift Total(mL/kg)  0(0) 0(0)      OUTPUT   Urine(mL/kg/hr)  300(0.3) 300(0.2)        Urine Voided  300 300      Shift Total(mL/kg)  300(3.7) 300(3.7)      NET  -300 -300      Weight (kg) 81.2 81.2 81.2 81.2 81.2 81.2       Inpatient Medications  Current Facility-Administered Medications   Medication Dose Route Frequency    sodium chloride (NS) flush 5-40 mL  5-40 mL IntraVENous Q8H    sodium chloride (NS) flush 5-40 mL  5-40 mL IntraVENous PRN    acetaminophen (TYLENOL) tablet 650 mg  650 mg Oral Q6H PRN    Or    acetaminophen (TYLENOL) suppository 650 mg  650 mg Rectal Q6H PRN    polyethylene glycol (MIRALAX) packet 17 g  17 g Oral DAILY PRN    ondansetron (ZOFRAN ODT) tablet 4 mg  4 mg Oral Q8H PRN    Or    ondansetron (ZOFRAN) injection 4 mg  4 mg IntraVENous Q6H PRN    enoxaparin (LOVENOX) injection 40 mg  40 mg SubCUTAneous DAILY    glucose chewable tablet 16 g  4 Tablet Oral PRN    dextrose (D50W) injection syrg 12.5-25 g  12.5-25 g IntraVENous PRN    glucagon (GLUCAGEN) injection 1 mg  1 mg IntraMUSCular PRN    albuterol (PROVENTIL VENTOLIN) nebulizer solution 2.5 mg  2.5 mg Nebulization Q6H PRN    arformoterol 15 mcg/budesonide 0.5 mg neb solution   Nebulization BID RT    insulin lispro (HUMALOG) injection   SubCUTAneous AC&HS    QUEtiapine (SEROquel) tablet 25 mg  25 mg Oral QHS    0.9% sodium chloride infusion  100 mL/hr IntraVENous CONTINUOUS    nicotine (NICODERM CQ) 14 mg/24 hr patch 1 Patch  1 Patch TransDERmal DAILY         Allergies  Allergies   Allergen Reactions    Pcn [Penicillins] Rash    Sulfa (Sulfonamide Antibiotics) Rash    Demerol [Meperidine] Nausea and Vomiting     Severe vomiting         CBC:  Recent Labs     01/13/22  0548 01/12/22 1913   WBC 6.1 5.9   HGB 15.2 13.6   HCT 43.7 39.1    631       Metabolic Panel:  Recent Labs     01/13/22  0548 01/12/22 1913    140   K 3.5 3.8    105   CO2 29 28   BUN 13 17   CREA 1.09 1.34*   * 199*   CA 9.5 8.8   MG 2.4 2.0   ALB 4.0 3.8   ALT 21 24              For Billing    Chief Complaint   Patient presents with    Altered mental status       Hospital Problems  Date Reviewed: 10/27/2021          Codes Class Noted POA    * (Principal) AMS (altered mental status) ICD-10-CM: R41.82  ICD-9-CM: 780.97  1/12/2022 Unknown        Type 2 diabetes mellitus with hyperglycemia, without long-term current use of insulin (HCC) (Chronic) ICD-10-CM: E11.65  ICD-9-CM: 250.00, 790.29  7/5/2017 Yes        Dementia without behavioral disturbance ([facility] ICD-10-CM: F03.90  ICD-9-CM: 294.20  4/6/2016 Yes        PUD (peptic ulcer disease) (Chronic) ICD-10-CM: K27.9  ICD-9-CM: 533.90  3/30/2016 Yes        Iron deficiency anemia (Chronic) ICD-10-CM: D50.9  ICD-9-CM: 280.9  3/30/2016 Yes        COPD (chronic obstructive pulmonary disease) (HCC) (Chronic) ICD-10-CM: J44.9  ICD-9-CM: 496  3/30/2016 Yes        HTN (hypertension) (Chronic) ICD-10-CM: I10  ICD-9-CM: 401.9  3/30/2016 Yes

## 2022-01-14 NOTE — PROGRESS NOTES
Verbal shift change report given to Diane Rene RN (oncoming nurse) by Alisia Lutz (offgoing nurse). Report included the following information SBAR, ED Summary, Procedure Summary, Intake/Output, MAR and Dual Neuro Assessment.

## 2022-01-14 NOTE — ACP (ADVANCE CARE PLANNING)
Primary Decision Maker (Active): Kishore Rizo - [phone]  Advance Care Planning 1/14/2022   Patient's Healthcare Decision Maker is: Legal Next of Raji Blanca   Primary Decision Maker Name Bess Rodriges   Primary Decision Maker Phone Number [phone]   Primary Decision Maker Relationship to Patient Wife   Confirm Advance Directive Not on file   Patient Would Like to Complete Advance Directive Unable due to advanced dementia      Pt does not have AMD on file, is unable to complete at this time due to advanced dementia. In absence of verified Medical POA, wife Bess Rodriges is legal NOK and surrogate decision maker. Pt currently has full code order in place; per Care Manager, wife is interested in speaking with a Provider re: changing code status to DNR.

## 2022-01-14 NOTE — PROGRESS NOTES
Problem: Self Care Deficits Care Plan (Adult)  Goal: *Therapy Goal (Edit Goal, Insert Text)  Description: FUNCTIONAL STATUS PRIOR TO ADMISSION: Patient has history of dementia and lives with spouse. She reports until recently patient was completing basic ADL tasks with independence, though he was not showering. He has a cane though does not typically use it for ambulation      HOME SUPPORT: The patient lived with spouse. Occupational Therapy Goals  Initiated 1/14/2022  1. Patient will perform grooming at sink with supervision/set-up within 7 day(s). 2.  Patient will perform upper body dressing with supervision/set-up within 7 day(s). 3.  Patient will perform lower body dressing with minimal assistance/contact guard assist within 7 day(s). 4.  Patient will perform toilet transfers with supervision/set-up within 7 day(s). 5.  Patient will perform all aspects of toileting with minimal assistance/contact guard assist within 7 day(s). Outcome: Not Met   OCCUPATIONAL THERAPY EVALUATION  Patient: Jasmyne Granda (62 y.o. male)  Date: 1/14/2022  Primary Diagnosis: AMS (altered mental status) [R41.82]        Precautions:   Fall    ASSESSMENT  Based on the objective data described below, the patient presents with decline in ADL task and mobility due to general weakness, impaired standing tolerance, impaired standing balance. Patient has confusion at baseline due to Alzheimer's dementia. He has been agitated during this admission though was pleasant and cooperative today. He is unsteady on his feet and is at risk for falls. SNF level rehab services recommended to maximize performance in stand prior to return home     Current Level of Function Impacting Discharge (ADLs/self-care): up to max assist for LB ADL, toileting. Min to Mod A for toilet transfers     Functional Outcome Measure:   The patient scored Total: 25/100 on the Barthel Index outcome measure       Other factors to consider for discharge: spouse Patient will benefit from skilled therapy intervention to address the above noted impairments. PLAN :  Recommendations and Planned Interventions: self care training, functional mobility training, therapeutic exercise, balance training, visual/perceptual training, therapeutic activities, endurance activities, patient education, home safety training, and family training/education    Frequency/Duration: Patient will be followed by occupational therapy 3 times a week to address goals.     Recommendation for discharge: (in order for the patient to meet his/her long term goals)  Therapy up to 5 days/week in SNF setting    This discharge recommendation:  Has been made in collaboration with the attending provider and/or case management    IF patient discharges home will need the following DME: none       SUBJECTIVE:   Patient confused, though pleasant and cooperative     OBJECTIVE DATA SUMMARY:   HISTORY:   Past Medical History:   Diagnosis Date    COPD (chronic obstructive pulmonary disease) ([facility]     Diabetes ([facility]     Endocrine disease     Essential hypertension     High cholesterol     Osteoarthritis     Skin cancer     Skin disorder     Sun-damaged skin     Sunburn, blistering      Past Surgical History:   Procedure Laterality Date    HX CERVICAL LAMINECTOMY      HX CHOLECYSTECTOMY      HX LUMBAR LAMINECTOMY      HX ORTHOPAEDIC Bilateral     hands and shoulders    HX TONSILLECTOMY         Expanded or extensive additional review of patient history:     Home Situation  Home Environment: Private residence  # Steps to Enter: 2  Rails to Enter: Yes  One/Two Story Residence: Two story  Living Alone: No  Support Systems: Spouse/Significant Other  Tub or Shower Type: Shower    Hand dominance: Right    EXAMINATION OF PERFORMANCE DEFICITS:  Cognitive/Behavioral Status:  Neurologic State: Alert  Orientation Level: Oriented to person  Cognition: Decreased command following;Decreased attention/concentration Perseveration: No perseveration noted  Safety/Judgement: Lack of insight into deficits      Range of Motion:    AROM: Generally decreased, functional                         Strength:    Strength: Generally decreased, functional                Coordination:  Coordination: Generally decreased, functional  Fine Motor Skills-Upper: Left Intact; Right Intact    Gross Motor Skills-Upper: Left Intact; Right Intact    Tone & Sensation:    Tone: Normal  Sensation: Impaired (numbness in feet)                      Balance:  Sitting: Intact; Without support  Standing: Impaired; Without support  Standing - Static: Constant support; Fair  Standing - Dynamic : Constant support; Fair    Functional Mobility and Transfers for ADLs:  Bed Mobility:  Supine to Sit: Minimum assistance  Sit to Supine: Minimum assistance  Scooting: Minimum assistance    Transfers:  Sit to Stand: Minimum assistance;Assist x1;Additional time  Stand to Sit: Minimum assistance;Assist x1;Additional time  Bed to Chair: Minimum assistance    ADL Assessment:  Feeding: Setup;Supervision    Oral Facial Hygiene/Grooming: Minimum assistance    Bathing: Maximum assistance    Upper Body Dressing: Maximum assistance    Lower Body Dressing: Maximum assistance    Toileting: Maximum assistance                ADL Intervention and task modifications:          Cognitive Retraining  Safety/Judgement: Lack of insight into deficits      Functional Measure:    Barthel Index:  Bathin  Bladder: 0  Bowels: 0  Groomin  Dressin  Feedin  Mobility: 0  Stairs: 0  Toilet Use: 5  Transfer (Bed to Chair and Back): 10  Total: 25/100      The Barthel ADL Index: Guidelines  1. The index should be used as a record of what a patient does, not as a record of what a patient could do. 2. The main aim is to establish degree of independence from any help, physical or verbal, however minor and for whatever reason. 3. The need for supervision renders the patient not independent.   4. A patient's performance should be established using the best available evidence. Asking the patient, friends/relatives and nurses are the usual sources, but direct observation and common sense are also important. However direct testing is not needed. 5. Usually the patient's performance over the preceding 24-48 hours is important, but occasionally longer periods will be relevant. 6. Middle categories imply that the patient supplies over 50 per cent of the effort. 7. Use of aids to be independent is allowed. Score Interpretation (from [address])    Independent   60-79 Minimally independent   40-59 Partially dependent   20-39 Very dependent   <20 Totally dependent     -Savana Dudley., Barthel, D.W. (1965). Functional evaluation: the Barthel Index. [address] ([address] (1997). The Barthel activities of daily living index: self-reporting versus actual performance in the old (> or = 75 years). Journal of [address] [place] [address] 45(7), [facility], J..., [facility], [facility]. (1999). Measuring the change in disability after inpatient rehabilitation; comparison of the responsiveness of the Barthel Index and Functional McAllister Measure. Journal of Neurology, Neurosurgery, and Psychiatry, 66(4), 904-652. Clemencia Gilford, N.J.A, GEOVANNA Mora, & Jose Hernandez M.A. (2004) Assessment of post-stroke quality of life in cost-effectiveness studies: The usefulness of the Barthel Index and the EuroQoL-5D.  Quality of Life Research, 15, 821-98     Occupational Therapy Evaluation Charge Determination   History Examination Decision-Making   LOW Complexity : Brief history review  LOW Complexity : 1-3 performance deficits relating to physical, cognitive , or psychosocial skils that result in activity limitations and / or participation restrictions  LOW Complexity : No comorbidities that affect functional and no verbal or physical assistance needed to complete eval tasks       Based on the above components, the patient evaluation is determined to be of the following complexity level: LOW   Pain Rating:  None indicated     Activity Tolerance:   Fair    After treatment patient left in no apparent distress:    Supine in bed, Call bell within reach, Bed / chair alarm activated, Caregiver / family present, and Restraint on LUE applied, RUE free for self feeding. Spouse advised to call nurse when patient is finished eating. Fidget blanket provided for distraction and to assist in keeping patient from pulling on lines/leads. Nurse made aware     COMMUNICATION/EDUCATION:   The patients plan of care was discussed with: Physical therapist and Registered nurse. Home safety education was provided and the caregiver indicated understanding. and Patient is unable to participate in goal setting and plan of care. This patients plan of care is appropriate for delegation to [facility].     Thank you for this referral.  Ambika Dorsey OT  Time Calculation: 30 mins

## 2022-01-14 NOTE — PROGRESS NOTES
1/14/2022  Case Management Progress Note    3:49 PM  Per request of MD, met with patient's daughter and wife at bedside. They are leaning towards hospice and are eager to speak with palliative team. Explained again that there are two options for hospice: either at home with extra support or in a facility. Explained that we may be able to skill patient for comfort care at a nursing facility to provide time to arrange longer term solutions if needed. Emphasized again that [facility] with [facility] for Medicaid screening will be very important. Patient's wife and daughter in understanding. ROD Leger    12:33 PM  Patient is 68year old male admitted 1/12 with altered mental status  Patient's RUR is 10% green/low risk for readmission  Covid test: none this admission, may need for placement  Chart reviewed--patient discussed at [facility] with patient's wife this morning. She asked about making him a DNR and potentially hospice care. I spoke with MD and requested a palliative care consult, which MD agreed with. Also discussed patient with Rashad Martinez with palliative. Talked with patient's wife about how she feels she cannot handle the patient at home and explained that we are not doing much for him medically, but there are some options including getting patient into a skilled nursing facility short term, even skilling him for comfort care if they are interested in going the hospice route. This option gives family some buffer time to look for longer term solutions, and with wife's approval I have sent referrals to the [address] who I know can do skilled care for comfort measures. Patient's wife and daughter have already been looking into assisted living facilities and a Medicaid screening is pending; wife said she had received a message from Magda, and I encouraged her to follow up as soon as she could.  I emphasized that knowing whether or not patient qualifies for Medicaid will open up our discharge planning options. Will continue to follow and update as discharge plan becomes more clear. Transition of Care Plan   1. Continue medical management/treatment  2. Palliative care consult  3. Therapy working with patient at time of this writing  4. Referrals sent to the [facility] as they may also be able to skill for comfort care  5.  CM will continue to follow    Roylene Goodell, MSW

## 2022-01-15 NOTE — PROGRESS NOTES
[address] [place] [address]   Office [phone]  Fax [phone]          Assessment and Plan     Mushtaq Chaudhry is a 68 y.o. male with a PMHx of Alzheimer's disease, DM2, HTN, HLD, COPD, skin cancer who is admitted for workup of acutely altered mental status in the s/o worsening dementia. He has spent 4 night(s) in the hospital.    24 Hour Events: Code [place] called overnight due to patient agitation. Patient had removed restraints, ripped out IV, and found walking broussard. Received Ativan 2mg IM with persistent agitation. Was given another 5mg Haldol with improvement. Alzheimer's dementia w/ behavioral disturbance: Acutely worsened from baseline. CT/MRI/MRA abd lab w/u unremarkable to date. Neurology recommended initiation of antipsychotic. - Family expressing wishes to pursue home hospice vs long term care, CM following and orders for hospice evaluation placed 1/15  - Palliative c/s, appreciate rec's   - Seroquel PO 25 mg qhs added 1/14  - Soft mittens remain in place     Elevated troponin: POA troponin of 10. Low concern for ACS given patient history. Repeat 14.      UA positive for blood: Concern for rhabdomyolysis vs underlying malignancy (bladder vs RCC). CK mildly elevated at 415. S/p NS IVF. - Consider uro follow up as outpatient      Elevated bilirubin: Unclear etiology. In past, has had elevated TB up to 2.8. Pt with h/o cholecystectomy. Indirect bili 1.5, direct 0.3.   - Daily CMP     DM2: A1c 6.7. Not on meds, per wife. - ACHS SSI, normal sensitivity, w/ POC glucose checks     Hypertension: Chronic, stable. Not on meds per wife. - Monitor     COPD: Chronic, stable. Per wife, using Symbicort and ProAir PRN, with increased use recently. Infection less likely given pro-jess <0.05.  - Continue home Symbicort  - Albuterol PRN     HLD:  POA lipids , HDL 55, , .  Not on meds  - Consider outpt statin therapy     MDD/DENISE: Not on meds, per pt wife, on Zoloft in past - however pt's wife said he d/c'ed use after not noticing much of a difference. Nicotine dependence: pt is a lifelong tobacco dip user. - Daily nicotine patch 21mg/24hr        FEN/GI - Easy-to-chew diet. Activity - Ambulate with assistance. DVT prophylaxis - Lovenox. GI prophylaxis - Not indicated at this time. Fall prophylaxis - Fall precautions in place. Disposition - Medical. Plan to d/c to [facility] vs SNF vs Hospice. Consulting PT/OT, CM, Palliative  Code Status - Full. Discussed with patient / caregivers. I appreciate the opportunity to participate in the care of this patient,  Lauren Mcneill MD  Family Medicine Resident         Subjective / Objective     Subjective: Asleep and only briefly wakes up for exam.     Objective:     Temp (24hrs), Av.9 °F (36.6 °C), Min:97.4 °F (36.3 °C), Max:98.5 °F (36.9 °C)     Physical Exam  Constitutional:       General: He is not in acute distress. Comments: Mitts in place     Cardiovascular:      Rate and Rhythm: Normal rate and regular rhythm. Pulses: Normal pulses. Heart sounds: Normal heart sounds. Pulmonary:      Effort: Pulmonary effort is normal.      Breath sounds: Normal breath sounds. Abdominal:      General: Abdomen is flat. Palpations: Abdomen is soft. Musculoskeletal:      Right lower leg: No edema. Left lower leg: No edema. Skin:     General: Skin is warm and dry. Neurological:      Mental Status: He is confused. Psychiatric:         Behavior: Behavior is not agitated or aggressive. Behavior is cooperative. Cognition and Memory: Cognition is impaired. Memory is impaired.         Respiratory:   O2 Device: None (Room air)     I/O:      Inpatient Medications  Current Facility-Administered Medications   Medication Dose Route Frequency    melatonin tablet 3 mg  3 mg Oral QHS    LORazepam (ATIVAN) injection 2 mg  2 mg IntraMUSCular Q4H PRN    arformoterol 15 mcg/budesonide 0.5 mg neb solution Nebulization BID PRN    sodium chloride (NS) flush 5-40 mL  5-40 mL IntraVENous Q8H    sodium chloride (NS) flush 5-40 mL  5-40 mL IntraVENous PRN    acetaminophen (TYLENOL) tablet 650 mg  650 mg Oral Q6H PRN    Or    acetaminophen (TYLENOL) suppository 650 mg  650 mg Rectal Q6H PRN    polyethylene glycol (MIRALAX) packet 17 g  17 g Oral DAILY PRN    ondansetron (ZOFRAN ODT) tablet 4 mg  4 mg Oral Q8H PRN    Or    ondansetron (ZOFRAN) injection 4 mg  4 mg IntraVENous Q6H PRN    enoxaparin (LOVENOX) injection 40 mg  40 mg SubCUTAneous DAILY    glucose chewable tablet 16 g  4 Tablet Oral PRN    dextrose (D50W) injection syrg 12.5-25 g  12.5-25 g IntraVENous PRN    glucagon (GLUCAGEN) injection 1 mg  1 mg IntraMUSCular PRN    albuterol (PROVENTIL VENTOLIN) nebulizer solution 2.5 mg  2.5 mg Nebulization Q6H PRN    insulin lispro (HUMALOG) injection   SubCUTAneous AC&HS    QUEtiapine (SEROquel) tablet 25 mg  25 mg Oral QHS    [Held by provider] 0.9% sodium chloride infusion  50 mL/hr IntraVENous CONTINUOUS    nicotine (NICODERM CQ) 14 mg/24 hr patch 1 Patch  1 Patch TransDERmal DAILY         Allergies  Allergies   Allergen Reactions    Pcn [Penicillins] Rash    Sulfa (Sulfonamide Antibiotics) Rash    Demerol [Meperidine] Nausea and Vomiting     Severe vomiting         CBC:  Recent Labs     01/15/22  0829   WBC 6.4   HGB 15.6   HCT 44.3          Metabolic Panel:  Recent Labs     01/15/22  0829      K 3.8   *   CO2 25   BUN 16   CREA 0.95   *   CA 9.0   MG 2.3   ALB 3.4*   ALT 28              For Billing    Chief Complaint   Patient presents with    Altered mental status       Hospital Problems  Date Reviewed: 10/27/2021          Codes Class Noted POA    * (Principal) AMS (altered mental status) ICD-10-CM: R41.82  ICD-9-CM: 780.97  1/12/2022 Unknown        Type 2 diabetes mellitus with hyperglycemia, without long-term current use of insulin (HCC) (Chronic) ICD-10-CM: E11.65  ICD-9-CM: 250.00, 790.29  7/5/2017 Yes        Dementia without behavioral disturbance ([facility] ICD-10-CM: F03.90  ICD-9-CM: 294.20  4/6/2016 Yes        PUD (peptic ulcer disease) (Chronic) ICD-10-CM: K27.9  ICD-9-CM: 533.90  3/30/2016 Yes        Iron deficiency anemia (Chronic) ICD-10-CM: D50.9  ICD-9-CM: 280.9  3/30/2016 Yes        COPD (chronic obstructive pulmonary disease) (HCC) (Chronic) ICD-10-CM: J44.9  ICD-9-CM: 496  3/30/2016 Yes        HTN (hypertension) (Chronic) ICD-10-CM: I10  ICD-9-CM: 401.9  3/30/2016 Yes

## 2022-01-15 NOTE — PROGRESS NOTES
Bedside and Verbal shift change report given to HARJIT Xie (oncoming nurse) by Rickey Silva (offgoing nurse). Report included the following information SBAR, Kardex, Procedure Summary, Intake/Output, MAR, Recent Results and Med Rec Status.

## 2022-01-15 NOTE — PROGRESS NOTES
Hospital Day 3:   CM Consult Noted:   12:56 PM- Weekend CM consulted for hospice info session- referral forwarded to [facility]. Will continue to follow and assist as needed.      ROD Benito, [address]

## 2022-01-15 NOTE — PROGRESS NOTES
[address] [place] [address]   Office [phone]  Fax [phone]          Assessment and Plan     Bre Gibbons is a 68 y.o. male with a PMHx of Alzheimer's disease, DM2, HTN, HLD, COPD, skin cancer who is admitted for workup of acutely altered mental status in the s/o worsening dementia. He has spent 3 night(s) in the hospital.    24 Hour Events: Order renewed for restraints, switched to soft mittens. Alzheimer's dementia w/ behavioral disturbance: Acutely worsened from baseline. CT/MRI/MRA abd lab w/u unremarkable to date. Neurology recommended initiation of antipsychotic. - Family expressing wishes to pursue home hospice vs long term care, CM following  - Palliative c/s, appreciate rec's   - Seroquel PO 25 mg qhs added 1/14  - Soft mittens remain in place     Elevated troponin: POA troponin of 10. Low concern for ACS given patient history. Repeat 14.      UA positive for blood: Concern for rhabdomyolysis vs underlying malignancy (bladder vs RCC). CK mildly elevated at 415. S/p NS IVF. - Consider uro follow up as outpatient      Elevated bilirubin: Unclear etiology. In past, has had elevated TB up to 2.8. Pt with h/o cholecystectomy. Indirect bili 1.5, direct 0.3.   - Daily CMP     DM2: A1c 6.7. Not on meds, per wife. - ACHS SSI, normal sensitivity, w/ POC glucose checks     Hypertension: Chronic, stable. Not on meds per wife. - Monitor     COPD: Chronic, stable. Per wife, using Symbicort and ProAir PRN, with increased use recently. Infection less likely given pro-jess <0.05.  - Continue home Symbicort  - Albuterol PRN     HLD:  POA lipids , HDL 55, , . Not on meds  - Consider outpt statin therapy     MDD/DENISE: Not on meds, per pt wife, on Zoloft in past - however pt's wife said he d/c'ed use after not noticing much of a difference. Nicotine dependence: pt is a lifelong tobacco dip user.   - Daily nicotine patch 21mg/24hr        FEN/GI - Easy-to-chew diet.  Activity - Ambulate with assistance. DVT prophylaxis - Lovenox. GI prophylaxis - Not indicated at this time. Fall prophylaxis - Fall precautions in place. Disposition - Medical. Plan to d/c to intermediate vs SNF vs Hospice. Consulting PT/OT, CM, Palliative  Code Status - Full. Discussed with patient / caregivers. I appreciate the opportunity to participate in the care of this patient,  Miguel Varela DO  Family Medicine Resident         Subjective / Objective     Subjective: Not responding to questions. Objective:     Temp (24hrs), Av.5 °F (36.4 °C), Min:97.3 °F (36.3 °C), Max:97.8 °F (36.6 °C)     Physical Exam  Constitutional:       General: He is not in acute distress. Comments: Mitts in place     Eyes:      Extraocular Movements: Extraocular movements intact. Conjunctiva/sclera: Conjunctivae normal.   Cardiovascular:      Rate and Rhythm: Normal rate and regular rhythm. Pulses: Normal pulses. Heart sounds: Normal heart sounds. Pulmonary:      Effort: Pulmonary effort is normal.      Breath sounds: Normal breath sounds. Abdominal:      General: Abdomen is flat. Palpations: Abdomen is soft. Musculoskeletal:      Right lower leg: No edema. Left lower leg: No edema. Skin:     General: Skin is warm and dry. Capillary Refill: Capillary refill takes less than 2 seconds. Neurological:      General: No focal deficit present. Mental Status: He is alert. He is disoriented and confused. Comments: Oriented to self only. Psychiatric:         Behavior: Behavior is not agitated or aggressive. Behavior is cooperative. Cognition and Memory: Cognition is impaired. Memory is impaired. Respiratory:   O2 Device: None (Room air)     I/O:  Date 22 - 01/15/22 0659 01/15/22 0700 - 22 0659   Shift 4076-1144 5104-8526 24 Hour Total 4563-3277 4598-4918 24 Hour Total   INTAKE   P.O. 720  720        P. O. 720  720      Shift Total(mL/kg) 720(8.9)  720(8.9)      OUTPUT   Urine(mL/kg/hr)  800(0.8) 800(0.4)        Urine Voided  800 800        Urine Occurrence(s)  1 x 1 x      Shift Total(mL/kg)  800(9.9) 800(9.9)       -800 -80      Weight (kg) 81.2 81.2 81.2 81.2 81.2 81.2       Inpatient Medications  Current Facility-Administered Medications   Medication Dose Route Frequency    arformoterol 15 mcg/budesonide 0.5 mg neb solution   Nebulization BID PRN    sodium chloride (NS) flush 5-40 mL  5-40 mL IntraVENous Q8H    sodium chloride (NS) flush 5-40 mL  5-40 mL IntraVENous PRN    acetaminophen (TYLENOL) tablet 650 mg  650 mg Oral Q6H PRN    Or    acetaminophen (TYLENOL) suppository 650 mg  650 mg Rectal Q6H PRN    polyethylene glycol (MIRALAX) packet 17 g  17 g Oral DAILY PRN    ondansetron (ZOFRAN ODT) tablet 4 mg  4 mg Oral Q8H PRN    Or    ondansetron (ZOFRAN) injection 4 mg  4 mg IntraVENous Q6H PRN    enoxaparin (LOVENOX) injection 40 mg  40 mg SubCUTAneous DAILY    glucose chewable tablet 16 g  4 Tablet Oral PRN    dextrose (D50W) injection syrg 12.5-25 g  12.5-25 g IntraVENous PRN    glucagon (GLUCAGEN) injection 1 mg  1 mg IntraMUSCular PRN    albuterol (PROVENTIL VENTOLIN) nebulizer solution 2.5 mg  2.5 mg Nebulization Q6H PRN    insulin lispro (HUMALOG) injection   SubCUTAneous AC&HS    QUEtiapine (SEROquel) tablet 25 mg  25 mg Oral QHS    0.9% sodium chloride infusion  100 mL/hr IntraVENous CONTINUOUS    nicotine (NICODERM CQ) 14 mg/24 hr patch 1 Patch  1 Patch TransDERmal DAILY         Allergies  Allergies   Allergen Reactions    Pcn [Penicillins] Rash    Sulfa (Sulfonamide Antibiotics) Rash    Demerol [Meperidine] Nausea and Vomiting     Severe vomiting         CBC:  Recent Labs     01/15/22  0829 01/13/22  0548 01/12/22  1913   WBC 6.4 6.1 5.9   HGB 15.6 15.2 13.6   HCT 44.3 43.7 39.1    209 812       Metabolic Panel:  Recent Labs     01/15/22  0829 01/13/22  0548 01/12/22  1913    141 140   K 3.8 3.5 3.8   * 108 105   CO2 25 29 28   BUN 16 13 17   CREA 0.95 1.09 1.34*   * 134* 199*   CA 9.0 9.5 8.8   MG 2.3 2.4 2.0   ALB 3.4* 4.0 3.8   ALT 28 21 24              For Billing    Chief Complaint   Patient presents with    Altered mental status       Hospital Problems  Date Reviewed: 10/27/2021          Codes Class Noted POA    * (Principal) AMS (altered mental status) ICD-10-CM: R41.82  ICD-9-CM: 780.97  1/12/2022 Unknown        Type 2 diabetes mellitus with hyperglycemia, without long-term current use of insulin (HCC) (Chronic) ICD-10-CM: E11.65  ICD-9-CM: 250.00, 790.29  7/5/2017 Yes        D[facility] without [facility] disturbance [facility] ICD-10-CM: F03.90  ICD-9-CM: 294.20  4/6/2016 Yes        PUD (peptic ulcer disease) (Chronic) ICD-10-CM: K27.9  ICD-9-CM: 533.90  3/30/2016 Yes        Iron deficiency anemia (Chronic) ICD-10-CM: D50.9  ICD-9-CM: 280.9  3/30/2016 Yes        COPD (chronic obstructive pulmonary disease) (HCC) (Chronic) ICD-10-CM: J44.9  ICD-9-CM: 496  3/30/2016 Yes        HTN (hypertension) (Chronic) ICD-10-CM: I10  ICD-9-CM: 401.9  3/30/2016 Yes

## 2022-01-16 NOTE — PROGRESS NOTES
Code [place] called at 19:33. Went to bedside and pt was in the hallway with security and staff, encouraging patient to return to his room. He reluctantly went back to his room but was still agitated. Per nursing, pt had removed his wrist restraints and ripped his IV out, walking in the hallway, agitated. Pt was difficult to reorient but finally agreed to receive medication after he was explained and shown what he would be getting. He was very confused, oriented only to self and wished to go home. He received Ativan 2mg IM, but patient remained agitated and still wanted to get up and go home. After sitting with patient and speaking to him for 10 minutes, pt continued to be agitated and wished to leave.  He then received 5mg Haldol IM and after another 10 minutes, he agreed to lie down and rest.    Mag Gonzalez MD  20:30 PM

## 2022-01-16 NOTE — PROGRESS NOTES
Bedside and Verbal shift change report given to HARJIT Chiu (oncoming nurse) by Ann Price (offgoing nurse). Report included the following information SBAR, Kardex, Procedure Summary, Intake/Output, MAR, Recent Results and Med Rec Status.

## 2022-01-16 NOTE — PROGRESS NOTES
0930 Pt constantly removing bilateral mittens, climbing out of bed without calling for assistance. Discontinued bilateral mittens. Attempted to re-orient patient to situation and to using the call bell when he needs assistance. Bed alarm set. Will continue to monitor. 1100 Pt OOB and walking out into hallway, bed alarm alarming. Nursing staff attempting to re-orient patient and remind him to remain in bed unless he calls the nursing staff prior. 1515 Patient continues to get out of bed and walk around room without calling for nursing staff. Administered Ativan 2mg IM for patient anxiety/restlessness. Sitter requested through nursing supervisor. Attempts to re-orient patient to room and process unsuccessful thus far. 1550 Pt bed alarm going off, patient wandering down the hallway. Nursing staff escorted patient back to room and re-oriented to room and using call bell if he needs to get out of bed. Pt back in bed now, and bed alarm set.

## 2022-01-16 NOTE — PROGRESS NOTES
Notify MD that patient does not have an IV access. Patient had pulled out access at the beginning of shift while agitated. Md stated to leave patient without IV for now.

## 2022-01-16 NOTE — PROGRESS NOTES
Dez Barreto called. Patient removed bilateral wrist restraints, walking in room very agitated, knocked over IV pole, knocked over bedside commode, ripped IV out and threw at RN. Walked into hallway and walked into another patient's room. Attempted to reorient patient and encourage patient to go back to room, patient refused. Called family practice. Able to get patient back into room and sit on the side of the bed. 65 - Family practice MD present at bedside, gave one time Ativan 2 mg IM. 200 - Family practice MD present at bedside, gave Haldol 5mg IM per MD orders. 2005 - Patient starting to get sleepy but still sitting at side of bed. PCT present to stay with patient. 2030 - Bedside shift change report given to 3916 Steven Wellington (oncoming nurse) by Haridk Burger (offgoing nurse). Report included the following information SBAR, Kardex, ED Summary, Intake/Output, MAR, Recent Results and Med Rec Status.
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
## 2022-01-17 NOTE — PROGRESS NOTES
Problem: Mobility Impaired (Adult and Pediatric)  Goal: *Acute Goals and Plan of Care (Insert Text)  Description: FUNCTIONAL STATUS PRIOR TO ADMISSION: Patient was modified independent  for functional mobility up to 2days PTA    HOME SUPPORT PRIOR TO ADMISSION: The patient lived with wife but did not require assist per wife. Pt did not bathe in shower. Per chart review,. Stacey Huitron Dtr stating that pt was confused at time and urinating in corner of rooms. Physical Therapy Goals  Initiated 1/14/2022  1. Patient will move from supine to sit and sit to supine  in bed with supervision/set-up within 7 day(s). 2.  Patient will transfer from bed to chair and chair to bed with minimal assistance/contact guard assist using the least restrictive device within 7 day(s). 3.  Patient will perform sit to stand with minimal assistance/contact guard assist within 7 day(s). 4.  Patient will ambulate with minimal assistance/contact guard assist for 100 feet with the least restrictive device within 7 day(s). 5.  Patient will ascend/descend 2 stairs with 2 handrail(s) with minimal assistance/contact guard assist within 7 day(s). 1/17/2022 1525 by Maribel Chung PT  Outcome: Not Met  PHYSICAL THERAPY TREATMENT  Patient: Rosa Candelaria (55 y.o. male)  Date: 1/17/2022  Diagnosis: AMS (altered mental status) [R41.82] AMS (altered mental status)       Precautions: Fall  Chart, physical therapy assessment, plan of care and goals were reviewed. ASSESSMENT  Patient continues with skilled PT services and is not progressing towards goals. Patient this afternoon with decline in functional mobility requiring maxAx2 for multiple sit <> stand transfers with RW. Patient is very confused (A&O x 1) with frequent but unintelligible verbalizations and poor command following.  Per chart, patient yesterday had been ambulating out of bed in hallway on 2 occassions without staff assist. Unclear how patient was able to accomplish this based on therapy treatment today and modAx2 presentation on previous PT treatment. Unable to gait train today. Patient with wet bedding and brief, bedding changed during third sit > stand trial. Patient only able to maintain semi standing with maxAx1 for approximately 10-20s on each trial. Returned to supine totalAx2. Continue to recommend rehab with 24/7 care. Current Level of Function Impacting Discharge (mobility/balance): maxAx2    Other factors to consider for discharge: advanced alzheimer's dementia         PLAN :  Patient continues to benefit from skilled intervention to address the above impairments. Continue treatment per established plan of care. to address goals. Recommendation for discharge: (in order for the patient to meet his/her long term goals)  Therapy up to 5 days/week in SNF setting    This discharge recommendation:  Has not yet been discussed the attending provider and/or case management    IF patient discharges home will need the following DME: to be determined (TBD)       SUBJECTIVE:   Patient stated .    OBJECTIVE DATA SUMMARY:   Patient received attempting to exit bed. Cleared by RN to participate in therapy session. Critical Behavior:  Neurologic State: Restless,Eyes open spontaneously,Confused  Orientation Level: Oriented to person,Disoriented to place,Disoriented to situation,Disoriented to time  Cognition: Decreased command following,Impulsive,Impaired decision making,Memory loss,Poor safety awareness  Safety/Judgement: Lack of insight into deficits  Functional Mobility Training:  Bed Mobility:     Supine to Sit: Minimum assistance; Additional time  Sit to Supine: Moderate assistance;Assist x2  Scooting: Minimum assistance; Additional time        Transfers:  Sit to Stand: Maximum assistance;Assist x2; x 4 trials   Stand to Sit: Maximum assistance;Assist x2                             Balance:  Sitting: Intact; With support;High guard  Standing: Impaired; With support;Pull to stand  Standing - Static: Constant support; Fair  Standing - Dynamic : Constant support;Poor  Ambulation/Gait Training:   NTT                        Pain Rating:  patient without complaints of pain, however verbalizes pain with passive movement of RLE by PT    Activity Tolerance:   Poor and requires frequent rest breaks    After treatment patient left in no apparent distress:   Supine in bed, Call bell within reach, Bed / chair alarm activated and side rails x 4 as found    COMMUNICATION/COLLABORATION:   The patients plan of care was discussed with: Occupational therapist, Registered nurse and Rehabilitation technician.      Andria De La Vega PT, DPT   Time Calculation: 26 mins
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
## 2022-01-17 NOTE — PROGRESS NOTES
[address] [facility] [address] [address]   Office [phone]  Fax [phone]          Assessment and Plan     Fior Hood is a 68 y.o. male with a PMHx of Alzheimer's disease, DM2, HTN, HLD, COPD, skin cancer who is admitted for workup of acutely altered mental status in the s/o worsening dementia. He has spent 5 night(s) in the hospital.    24 Hour Events: NAEO    Alzheimer's dementia w/ behavioral disturbance: Acutely worsened from baseline. CT/MRI/MRA abd lab w/u unremarkable to date. Neurology recommended initiation of antipsychotic. Currently not requiring restraints. - Family expressing wishes to pursue home hospice vs long term care - palliative and hospice to see today  - Seroquel PO 25 mg qhs added 1/14     Elevated troponin: POA troponin of 10. Low concern for ACS given patient history. Repeat 14.      UA positive for blood: Concern for rhabdomyolysis vs underlying malignancy (bladder vs RCC). CK mildly elevated at 415. S/p NS IVF. - Consider uro follow up as outpatient      Elevated bilirubin: Unclear etiology. In past, has had elevated TB up to 2.8. Pt with h/o cholecystectomy. Indirect bili 1.5, direct 0.3.   - Daily CMP     DM2: A1c 6.7. Not on meds, per wife. - ACHS SSI, normal sensitivity, w/ POC glucose checks     Hypertension: Chronic, stable. Not on meds per wife. - Monitor     COPD: Chronic, stable. Per wife, using Symbicort and ProAir PRN, with increased use recently. Infection less likely given pro-jess <0.05.  - Continue home Symbicort  - Albuterol PRN     HLD:  POA lipids , HDL 55, , . Not on meds  - Consider outpt statin therapy     MDD/DENISE: Not on meds, per pt wife, on Zoloft in past - however pt's wife said he d/c'ed use after not noticing much of a difference. Nicotine dependence: pt is a lifelong tobacco dip user. - Daily nicotine patch 21mg/24hr        FEN/GI - Easy-to-chew diet. Activity - Ambulate with assistance.   DVT prophylaxis - Lovenox. GI prophylaxis - Not indicated at this time. Fall prophylaxis - Fall precautions in place. Disposition - Medical. Plan to d/c to correction vs SNF vs Hospice. Consulting PT/OT, CM, Palliative  Code Status - Full. Discussed with patient / caregivers. I appreciate the opportunity to participate in the care of this patient,  Carole Valle DO  Family Medicine Resident         Subjective / Objective     Subjective: Sleeping comfortably     Objective:     Temp (24hrs), Av.6 °F (36.4 °C), Min:97.4 °F (36.3 °C), Max:98 °F (36.7 °C)     Physical Exam  Constitutional:       General: He is not in acute distress. Comments: Sleepy but arousable. Speech is incomprehensible. Cardiovascular:      Rate and Rhythm: Normal rate and regular rhythm. Pulses: Normal pulses. Heart sounds: Normal heart sounds. Pulmonary:      Effort: Pulmonary effort is normal.      Breath sounds: Normal breath sounds. Abdominal:      General: Abdomen is flat. Palpations: Abdomen is soft. Musculoskeletal:      Right lower leg: No edema. Left lower leg: No edema. Skin:     General: Skin is warm and dry. Neurological:      Mental Status: He is disoriented and confused. Psychiatric:         Behavior: Behavior is not agitated or aggressive. Cognition and Memory: Cognition is impaired. Memory is impaired.         Respiratory:   O2 Device: None (Room air)     I/O:      Inpatient Medications  Current Facility-Administered Medications   Medication Dose Route Frequency    melatonin tablet 3 mg  3 mg Oral QHS    LORazepam (ATIVAN) injection 2 mg  2 mg IntraMUSCular Q4H PRN    arformoterol 15 mcg/budesonide 0.5 mg neb solution   Nebulization BID PRN    sodium chloride (NS) flush 5-40 mL  5-40 mL IntraVENous Q8H    sodium chloride (NS) flush 5-40 mL  5-40 mL IntraVENous PRN    acetaminophen (TYLENOL) tablet 650 mg  650 mg Oral Q6H PRN    Or    acetaminophen (TYLENOL) suppository 650 mg  650 mg Rectal Q6H PRN    polyethylene glycol (MIRALAX) packet 17 g  17 g Oral DAILY PRN    ondansetron (ZOFRAN ODT) tablet 4 mg  4 mg Oral Q8H PRN    Or    ondansetron (ZOFRAN) injection 4 mg  4 mg IntraVENous Q6H PRN    enoxaparin (LOVENOX) injection 40 mg  40 mg SubCUTAneous DAILY    glucose chewable tablet 16 g  4 Tablet Oral PRN    dextrose (D50W) injection syrg 12.5-25 g  12.5-25 g IntraVENous PRN    glucagon (GLUCAGEN) injection 1 mg  1 mg IntraMUSCular PRN    albuterol (PROVENTIL VENTOLIN) nebulizer solution 2.5 mg  2.5 mg Nebulization Q6H PRN    insulin lispro (HUMALOG) injection   SubCUTAneous AC&HS    QUEtiapine (SEROquel) tablet 25 mg  25 mg Oral QHS    nicotine (NICODERM CQ) 14 mg/24 hr patch 1 Patch  1 Patch TransDERmal DAILY         Allergies  Allergies   Allergen Reactions    Pcn [Penicillins] Rash    Sulfa (Sulfonamide Antibiotics) Rash    Demerol [Meperidine] Nausea and Vomiting     Severe vomiting         CBC:  Recent Labs     01/15/22  0829   WBC 6.4   HGB 15.6   HCT 44.3          Metabolic Panel:  Recent Labs     01/15/22  0829      K 3.8   *   CO2 25   BUN 16   CREA 0.95   *   CA 9.0   MG 2.3   ALB 3.4*   ALT 28              For Billing    Chief Complaint   Patient presents with    Altered mental status       Hospital Problems  Date Reviewed: 10/27/2021          Codes Class Noted POA    * (Principal) AMS (altered mental status) ICD-10-CM: R41.82  ICD-9-CM: 780.97  1/12/2022 Unknown        Type 2 diabetes mellitus with hyperglycemia, without long-term current use of insulin (HCC) (Chronic) ICD-10-CM: E11.65  ICD-9-CM: 250.00, 790.29  7/5/2017 Yes        Dementia without behavioral disturbance (HCC) ICD-10-CM: F03.90  ICD-9-CM: 294.20  4/6/2016 Yes        PUD (peptic ulcer disease) (Chronic) ICD-10-CM: K27.9  ICD-9-CM: 533.90  3/30/2016 Yes        Iron deficiency anemia (Chronic) ICD-10-CM: D50.9  ICD-9-CM: 280.9  3/30/2016 Yes        COPD (chronic obstructive pulmonary disease) ([facility] (Chronic) ICD-10-CM: J44.9  ICD-9-CM: 841  3/30/2016 Yes        HTN (hypertension) (Chronic) ICD-10-CM: I10  ICD-9-CM: 401.9  3/30/2016 Yes

## 2022-01-17 NOTE — PROGRESS NOTES
Palliative Medicine Social Work Note      SW attempted visit. Wife was no longer present, pt was sitting on side of bed preparing to work with PT/OT. SW discussed pt's care at length with Care Manager and with [facility] liaison. Current plan is for SNF, pending acceptance at a facility. Pt now has inpt DNR order in place, will need DDNR completed prior to discharge. SW will attempt supportive visit when wife is present. Thank you for including Palliative Medicine in the care of Mr. Chávez.      [address] [place] [address], [facility]  Palliative Medicine:  [phone])

## 2022-01-17 NOTE — PROGRESS NOTES
1/17/2022  Case Management Progress Note    2:11 PM  Referrals also sent to [facility] and David Haile, MSW    2:00 PM  Had conversation with patient's wife in person and daughter Wilber Ro via phone. They do not want to bring the patient home, but are unable to afford long term care placement in a memory care facility. They are interested in hospice care but in the course of the conversation it seems that the best way forward is continuing to look for a facility to skill the patient for a short period of time to give patient's family a buffer period to look into other options. Patient's daughter has been looking into memory care centers and the cost is too high. Medicaid screening still pending, but patient's wife is concerned he will not qualify. I will complete a UAI for the patient additionally. I did explain the general process of a Medicaid spend down program and the way that it would take time for Medicaid to kick in either way. Sent further referrals to [facility] and [facility] [facility], as family has no preference. Will continue to follow and assist with discharge planning. Darrel Esparza, MSW    12:28 PM  Patient is 68year old male admitted 1/12 with altered mental status  Patient's RUR is 11% green/low risk for readmission  Covid test: none this admission, may need for placement  Chart reviewed--patient discussed at [address] [place] [address] with MD, RN, and hospice liaison this morning. I understand at this point that hospice is an option, but patient's wife really does not want to bring him home. I have sent referrals for patient to potentially be skilled for comfort care and the [facility] is assessing to see what they can do. I have also sent a message to Medicaid screener to follow up as I have not seen any notes about the screening yet. Will continue to follow and update as I find out more. Transition of Care Plan   1. Continue medical management/treatment  2.  Working on disposition planning--the [facility] is reviewing to see if we can find something to skill him for  3. Sent email to follow up on Medicaid screening   4. Family does not want to take patient home   5.  CM will continue to follow and assist with discharge planning    ROD Tamayo

## 2022-01-18 NOTE — PROGRESS NOTES
Bedside and Verbal shift change report given to Isaac Nguyễn (oncoming nurse) by Bar Pires (offgoing nurse). Report included the following information SBAR, Kardex, Intake/Output, Accordion and Recent Results.

## 2022-01-18 NOTE — PROGRESS NOTES
[address] [address]   Office [phone]  Fax [phone]          Assessment and Plan     Jasmyne Granda is a 68 y.o. male with a PMHx of Alzheimer's disease, DM2, HTN, HLD, COPD, skin cancer who is admitted for workup of acutely altered mental status in the s/o worsening dementia. He has spent 6 night(s) in the hospital.    24 Hour Events: Patient is now DNR status. Requires frequent reorienting throughout the night. Alzheimer's dementia w/ behavioral disturbance: Acutely worsened from baseline. CT/MRI/MRA abd lab w/u unremarkable to date. Neurology recommended initiation of antipsychotic. Currently not requiring restraints. - Family expressing wishes to pursue home hospice vs long term care   - CM sending SNF referrals  - Seroquel 25mg BID per hospice recommendations  - Melatonin prn for insomnia  - Palliative following, appreciate recommendations     Elevated troponin: POA troponin of 10. Low concern for ACS given patient history. Repeat 14.      UA positive for blood: Concern for rhabdomyolysis vs underlying malignancy (bladder vs RCC). CK mildly elevated at 415. S/p NS IVF. - Consider uro follow up as outpatient      Elevated bilirubin: Unclear etiology. In past, has had elevated TB up to 2.8. Pt with h/o cholecystectomy. Indirect bili 1.5, direct 0.3.   - Daily CMP     DM2: A1c 6.7. Not on meds, per wife. - ACHS SSI, normal sensitivity, w/ POC glucose checks     Hypertension: Chronic, stable. Not on meds per wife. - Monitor     COPD: Chronic, stable. Per wife, using Symbicort and ProAir PRN, with increased use recently. Infection less likely given pro-jess <0.05.  - Continue home Symbicort  - Albuterol PRN     HLD:  POA lipids , HDL 55, , . Not on meds  - Consider outpt statin therapy     MDD/DENISE: Not on meds, per pt wife, on Zoloft in past - however pt's wife said he d/c'ed use after not noticing much of a difference.     Nicotine dependence: pt is a lifelong tobacco dip user. - Daily nicotine patch 21mg/24hr        FEN/GI - Carb restricted diet. Activity - Ambulate with assistance. DVT prophylaxis - Lovenox. GI prophylaxis - Not indicated at this time. Fall prophylaxis - Fall precautions in place. Disposition - Plan to d/c to SNF vs Hospice. Consulting PT/OT, CM, Palliative  Code Status - DNR. Discussed with patient / caregivers. I appreciate the opportunity to participate in the care of this patient,  Joel Cote MD  [facility] Medicine Resident         Subjective / Objective     Subjective:  Patient examined at bedside. He is asleep and comfortable. Briefly woke up for exam but was not answering questions. Objective:   Visit Vitals  /80 (BP 1 Location: Left upper arm, BP Patient Position: At rest)   Pulse 86   Temp 97.9 °F (36.6 °C)   Resp 19   Ht 6' (1.829 m)   Wt 179 lb (81.2 kg)   SpO2 96%   BMI 24.28 kg/m²        Physical Exam  Constitutional:       General: He is not in acute distress. Comments:      Cardiovascular:      Rate and Rhythm: Normal rate and regular rhythm. Pulses: Normal pulses. Heart sounds: Normal heart sounds. Pulmonary:      Effort: Pulmonary effort is normal.      Breath sounds: Normal breath sounds. Abdominal:      General: Abdomen is flat. Palpations: Abdomen is soft. Musculoskeletal:      Right lower leg: No edema. Left lower leg: No edema. Skin:     General: Skin is warm and dry. Neurological:      Mental Status: He is disoriented and confused. Psychiatric:         Behavior: Behavior is not agitated or aggressive. Cognition and Memory: Cognition is impaired. Memory is impaired. Date 01/17/22 0700 - 01/18/22 0659 01/18/22 0700 - 01/19/22 0659   Shift 6521-0342 2825-5163 24 Hour Total 6539-5382 6562-6422 24 Hour Total   INTAKE   P.O.    337  337     P. O.    337  337   I.V.(mL/kg/hr)    0  0     I.V.    0  0   Blood    0  0     Autotransfused    0  0   Other 0  0     Other    0  0   Shift Total(mL/kg)    337(4.2)  337(4.2)   OUTPUT   Urine(mL/kg/hr)    0  0     Urine Voided    0  0     Urine Occurrence(s)    1 x  1 x   Emesis/NG output    0  0     Emesis    0  0     Emesis Occurrence(s)    0 x  0 x   Other    0  0     Other Output    0  0   Stool    0  0     Stool Occurrence(s)    0 x  0 x     Stool    0  0   Blood    0  0     Quantitative Blood Loss    0  0     Blood    0  0   Shift Total(mL/kg)    0(0)  0(0)   NET    337  337   Weight (kg) 81.2 81.2 81.2 81.2 81.2 81.2   Inpatient Medications  Current Facility-Administered Medications   Medication Dose Route Frequency    melatonin tablet 3 mg  3 mg Oral QHS    LORazepam (ATIVAN) injection 2 mg  2 mg IntraMUSCular Q4H PRN    arformoterol 15 mcg/budesonide 0.5 mg neb solution   Nebulization BID PRN    sodium chloride (NS) flush 5-40 mL  5-40 mL IntraVENous Q8H    sodium chloride (NS) flush 5-40 mL  5-40 mL IntraVENous PRN    acetaminophen (TYLENOL) tablet 650 mg  650 mg Oral Q6H PRN    Or    acetaminophen (TYLENOL) suppository 650 mg  650 mg Rectal Q6H PRN    polyethylene glycol (MIRALAX) packet 17 g  17 g Oral DAILY PRN    ondansetron (ZOFRAN ODT) tablet 4 mg  4 mg Oral Q8H PRN    Or    ondansetron (ZOFRAN) injection 4 mg  4 mg IntraVENous Q6H PRN    enoxaparin (LOVENOX) injection 40 mg  40 mg SubCUTAneous DAILY    glucose chewable tablet 16 g  4 Tablet Oral PRN    dextrose (D50W) injection syrg 12.5-25 g  12.5-25 g IntraVENous PRN    glucagon (GLUCAGEN) injection 1 mg  1 mg IntraMUSCular PRN    albuterol (PROVENTIL VENTOLIN) nebulizer solution 2.5 mg  2.5 mg Nebulization Q6H PRN    insulin lispro (HUMALOG) injection   SubCUTAneous AC&HS    QUEtiapine (SEROquel) tablet 25 mg  25 mg Oral QHS    nicotine (NICODERM CQ) 14 mg/24 hr patch 1 Patch  1 Patch TransDERmal DAILY         Allergies  Allergies   Allergen Reactions    Pcn [Penicillins] Rash    Sulfa (Sulfonamide Antibiotics) Rash    Demerol [Meperidine] Nausea and Vomiting     Severe vomiting         CBC:  Recent Labs     01/18/22  0630   WBC 8.1   HGB 14.7   HCT 41.8          Metabolic Panel:  Recent Labs     01/18/22  0630      K 3.5      CO2 29   BUN 11   CREA 0.87   *   CA 9.1   MG 2.1              For Billing    Chief Complaint   Patient presents with    Altered mental status       Hospital Problems  Date Reviewed: 10/27/2021          Codes Class Noted POA    * (Principal) AMS (altered mental status) ICD-10-CM: R41.82  ICD-9-CM: 780.97  1/12/2022 Unknown        Type 2 diabetes mellitus with hyperglycemia, without long-term current use of insulin (HCC) (Chronic) ICD-10-CM: E11.65  ICD-9-CM: 250.00, 790.29  7/5/2017 Yes        Dementia without behavioral disturbance ([facility] ICD-10-CM: F03.90  ICD-9-CM: 294.20  4/6/2016 Yes        PUD (peptic ulcer disease) (Chronic) ICD-10-CM: K27.9  ICD-9-CM: 533.90  3/30/2016 Yes        Iron deficiency anemia (Chronic) ICD-10-CM: D50.9  ICD-9-CM: 280.9  3/30/2016 Yes        COPD (chronic obstructive pulmonary disease) (HCC) (Chronic) ICD-10-CM: J44.9  ICD-9-CM: 496  3/30/2016 Yes        HTN (hypertension) (Chronic) ICD-10-CM: I10  ICD-9-CM: 401.9  3/30/2016 Yes

## 2022-01-18 NOTE — PROGRESS NOTES
Comprehensive Nutrition Assessment    Type and Reason for Visit: Initial,RD nutrition re-screen/LOS    Nutrition Recommendations/Plan:   1. Modify diet order to regular, 4 Carb Choice. 2. Provide meal assistance/cues as needed  3. Modify supplement - Provide Ensure High Protein BID (320 kcal, 38 g carbs, 32 g protein)      Nutrition Assessment:    Pt is a 68year old male admitted with AMS. He has a past medical history of COPD, Diabetes, Endocrine disease, Essential hypertension, High cholesterol, Osteoarthritis, Skin cancer, Skin disorder, Sun-damaged skin, and Sunburn, blistering. Patient unable to provide any reliable hx at time of RD visit. NOK phone line busy. Meal documentation lacking, but appears to average 75% of all meals. Mild weight loss noted x 2 years, but nothing clinically significant. Unsure UBW. No noted N/V/D. NKFA. Patient on regular diet with no carb restriction - last three BG , 175, 255. RD to modify diet order. PO intake likely meeting >/= 85% kcal needs and >/=75% protein needs. Wt Readings from Last 10 Encounters:   01/13/22 81.2 kg (179 lb)   10/27/21 82.1 kg (181 lb)   06/08/21 85.7 kg (189 lb)   10/13/20 93.9 kg (207 lb)   11/26/19 94.8 kg (209 lb)   07/09/19 90.1 kg (198 lb 9.6 oz)   04/01/19 86.6 kg (191 lb)   03/26/19 85.7 kg (189 lb)   03/06/19 90 kg (198 lb 8 oz)   12/06/18 91.7 kg (202 lb 2 oz)     Last 3 Recorded Weights in this Encounter    01/12/22 1911 01/13/22 1011 01/13/22 1405   Weight: 81.6 kg (180 lb) 81.6 kg (179 lb 14.3 oz) 81.2 kg (179 lb)       Malnutrition Assessment:  Malnutrition Status:   At risk for malnutrition (specify) - older adult with advanced dementia  Context:  Acute illness     Findings of the 6 clinical characteristics of malnutrition:   Energy Intake:  No significant decrease in energy intake  Weight Loss:  No significant weight loss     Body Fat Loss:  No significant body fat loss,     Muscle Mass Loss:  No significant muscle mass loss, Fluid Accumulation:  No significant fluid accumulation,     Strength:  Not performed     Estimated Daily Nutrient Needs:  Energy (kcal): 2052; Weight Used for Energy Requirements: Current  Protein (g): 81-97 (1.0-1.2); Weight Used for Protein Requirements: Current  Fluid (ml/day): 2052; Method Used for Fluid Requirements: 1 ml/kcal    Nutrition Related Findings:       ABD: WNL 1/18  Last BM: 1/17  Edema: None noted 1/18    Nutr. Labs:  Lab Results   Component Value Date/Time    GFR est AA >60 01/18/2022 06:30 AM    GFR est non-AA >60 01/18/2022 06:30 AM    Creatinine (POC) 0.9 08/02/2013 11:38 AM    Creatinine 0.87 01/18/2022 06:30 AM    BUN 11 01/18/2022 06:30 AM    Sodium 140 01/18/2022 06:30 AM    Potassium 3.5 01/18/2022 06:30 AM    Chloride 107 01/18/2022 06:30 AM    CO2 29 01/18/2022 06:30 AM     Lab Results   Component Value Date/Time    Glucose 178 (H) 01/18/2022 06:30 AM    Glucose (POC) 284 (H) 01/18/2022 12:04 PM     Lab Results   Component Value Date/Time    Hemoglobin A1c 6.7 (H) 01/13/2022 05:48 AM       Nutr. Meds:  Lovenox, Humalog, Zofran PRN, Miralax PRN    Wounds:    None       Current Nutrition Therapies:  ADULT ORAL NUTRITION SUPPLEMENT Lunch, Dinner; Clear Liquid  ADULT DIET Regular    Anthropometric Measures:  · Height:  6' (182.9 cm)  · Current Body Wt:  81.2 kg (179 lb)   · Admission Body Wt:  180 lb    · Usual Body Wt:        · Ideal Body Wt:  178 lbs:  100.6 %   · Body mass index is 24.28 kg/m².   · BMI Category:  Normal weight (BMI 22.0-24.9) age over 72       Nutrition Diagnosis:   · Altered nutrition-related lab values related to endocrine dysfunction as evidenced by  (elevated blood glucose, A1C)    Nutrition Interventions:   Food and/or Nutrient Delivery: Modify current diet  Nutrition Education and Counseling: No recommendations at this time  Coordination of Nutrition Care: Continue to monitor while inpatient,Interdisciplinary rounds    Goals:  PO intake >/= 75% of all meals and BG averaging <180 within 5 - 7 days       Nutrition Monitoring and Evaluation:   Behavioral-Environmental Outcomes: None identified  Food/Nutrient Intake Outcomes: Food and nutrient intake  Physical Signs/Symptoms Outcomes: Biochemical data,Weight,Meal time behavior    Discharge Planning:     Too soon to determine     Electronically signed by Jason Flowers RD on 7/22/3120   Contact: [phone] or via [facility]

## 2022-01-18 NOTE — PROGRESS NOTES
Problem: Mobility Impaired (Adult and Pediatric)  Goal: *Acute Goals and Plan of Care (Insert Text)  Description: FUNCTIONAL STATUS PRIOR TO ADMISSION: Patient was modified independent  for functional mobility up to 2days PTA    HOME SUPPORT PRIOR TO ADMISSION: The patient lived with wife but did not require assist per wife. Pt did not bathe in shower. Per chart review,. Rosenda Farrell Dtr stating that pt was confused at time and urinating in corner of rooms. Physical Therapy Goals  Initiated 1/14/2022  1. Patient will move from supine to sit and sit to supine  in bed with supervision/set-up within 7 day(s). 2.  Patient will transfer from bed to chair and chair to bed with minimal assistance/contact guard assist using the least restrictive device within 7 day(s). 3.  Patient will perform sit to stand with minimal assistance/contact guard assist within 7 day(s). 4.  Patient will ambulate with minimal assistance/contact guard assist for 100 feet with the least restrictive device within 7 day(s). 5.  Patient will ascend/descend 2 stairs with 2 handrail(s) with minimal assistance/contact guard assist within 7 day(s). Outcome: Progressing Towards Goal  Note:   PHYSICAL THERAPY TREATMENT  Patient: Ena Horne (21 y.o. male)  Date: 1/18/2022  Diagnosis: AMS (altered mental status) [R41.82] AMS (altered mental status)       Precautions: Fall  Chart, physical therapy assessment, plan of care and goals were reviewed. ASSESSMENT  Patient continues with skilled PT services and progressing towards goals. Pt oriented to self only. Climbing OOB and standing in room. Difficulty following verbal cues. Requires Min A for functional transfers when motivated. Declined gait training with HHA or with using RW.      Current Level of Function Impacting Discharge (mobility/balance): Min A     Other factors to consider for discharge:          PLAN :  Patient continues to benefit from skilled intervention to address the above impairments. Continue treatment per established plan of care. to address goals. Recommendation for discharge: (in order for the patient to meet his/her long term goals)  Therapy up to 5 days/week in SNF setting    This discharge recommendation:  Has been made in collaboration with the attending provider and/or case management    IF patient discharges home will need the following DME: to be determined (TBD)       SUBJECTIVE:   Patient stated we can go on that vacation.     OBJECTIVE DATA SUMMARY:   Critical Behavior:  Neurologic State: Confused,Restless,Eyes open spontaneously  Orientation Level: Oriented to person  Cognition: Decreased attention/concentration,Decreased command following,Impaired decision making,Impulsive,Memory loss,Poor safety awareness  Safety/Judgement: Lack of insight into deficits  Functional Mobility Training:  Bed Mobility:     Supine to Sit: Contact guard assistance  Sit to Supine: Contact guard assistance           Transfers:  Sit to Stand: Minimum assistance  Stand to Sit: Minimum assistance                             Balance:  Sitting: Intact; With support;High guard  Standing: Impaired;Pull to stand; With support  Standing - Static: Constant support; Fair  Standing - Dynamic : Poor  Ambulation/Gait Training:                                                        Stairs: Therapeutic Exercises:     Pain Rating:  Denies pain    Activity Tolerance:   Fair    After treatment patient left in no apparent distress:   Sitting in chair and Call bell within reach    COMMUNICATION/COLLABORATION:   The patients plan of care was discussed with: Registered nurse. Estrellita Cheadle   Time Calculation: 22 mins

## 2022-01-18 NOTE — PROGRESS NOTES
[phone] [id number]     1945  68 y.o.  male    Patient Telephone Number: [phone] (home)   [employer] Affiliation: Pam Guerin   Language: English   Patient Active Problem List    Diagnosis Date Noted    AMS (altered mental status) 01/12/2022    High cholesterol 10/27/2021    [facility] 10/26/2018    Type 2 diabetes mellitus with hyperglycemia, without long-term current use of insulin ([facility] 07/05/2017    Osteoarthritis 03/06/2017    Advance care planning 12/05/2016    Dementia without behavioral disturbance ([facility] 04/06/2016    PUD (peptic ulcer disease) 03/30/2016    Iron deficiency anemia 03/30/2016    Candidal esophagitis ([facility] 03/30/2016    COPD (chronic obstructive pulmonary disease) ([facility] 03/30/2016    HTN (hypertension) 03/30/2016    Blood loss anemia 03/28/2016    GI bleed 03/28/2016        Date: 1/18/2022            Total Time (in minutes): 25          SFM  MED SURG 2    Mental Status:   [x] Alert [  ] Forgetful [x]  Confused  [  ] Minimally responsive  [  ] Sleeping    Communication Status: [  ] Impaired Speech [  ] Nonverbal -N/A    Physical Status:   [  ] Oxygen in use  [  ] Hard of Hearing [  ] Vision Impaired  [  ] Ambulatory  [  ] Ambulatory with assistance [  ] Non-ambulatory -N/A    Music Preferences, Background: Pt said he likes everything. His spouse confirmed he likes a wide range of genres, giving the example of 'from Country to Classical.' She also shared pt likes Traditional Hymns. Pt's spouse shared that the pt played a wind instrument in the marching band in school. Clinical Problem addressed: Emotional support for pt/family. Goal(s) met in session:  Physical/Pain management (Scale of 1-10):    Pre-session rating: Pt denied pain. Post-session rating: N/A: Please see Session Observations below.   [  ] Increased relaxation   [  ] Affected breathing patterns  [  ] Decreased muscle tension   [  ] Decreased agitation  [  ] Affected heart rate    [  ] Increased alertness     Emotional/Psychological:  [x] Increased self-expression   [  ] Decreased aggressive behavior   [  ] Decreased feelings of stress  [  ] Discussed healthy coping skills     [  ] Improved mood    [  ] Decreased withdrawn behavior     Social:  [  ] Decreased feelings of isolation/loneliness [x] Positive social interaction   [x] Provided support and/or comfort for family/friends    Spiritual:  [  ] Spiritual support    [  ] Expressed peace  [  ] Expressed jadon    [  ] Discussed beliefs    Techniques Utilized (Check all that apply):   [  ] Procedural support MT [  ] Music for relaxation [x] Patient preferred music  [  ] Isabella analysis  [  ] Song choice  [  ] Music for validation  [  ] Entrainment  [x] Movement to music [  ] Guided visualization  [  ] Stas Valencia  [  ] Patient instrument playing [  ] Becka Cesar writing  [x] Sing along   [  ] Vaibhav De Los Santos  [  ] Sensory stimulation  [x] Active Listening  [  ] Music for spiritual support [  ] Making of CDs as gifts    Session Observations:  F/up visit; Patient (pt) was alert sitting in a chair. His spouse Simona More was sitting next to him. This music therapist (MT) asked pt how he was feeling. The pt displayed confusion throughout the session, as evidenced by (AEB) initiating conversation and responding to questions with seemingly irrelevant information to the context. MT asked the pt about his music preferences and music background. Pt responded and his spouse confirmed and added to what he shared. MT sat across from the pt and his spouse and sang and played the [employer] with guitar. Pt's affect brightened and he increased self-expression in response to the music AEB moving his upper body and feet to the beat and singing along to parts of the song. Pt expressed enjoyment in the music. Pt's spouse observed the pt's singing along, too.  Pt then needed to use the restroom so the session concluded prematurely. Pt's spouse thanked MT for the session.     ALAN Mcnair (Music Therapist-Board Certified)  Spiritual Care Department  Referral-based service

## 2022-01-18 NOTE — PROGRESS NOTES
Patient tolerating regular diet, thin liquids without s/s aspiration. No additional SLP warranted at this time.

## 2022-01-18 NOTE — PROGRESS NOTES
[address] [place] [address]   Office [phone]  Fax [phone]          Assessment and Plan     Jennifer Stein is a 68 y.o. male with a PMHx of Alzheimer's disease, DM2, HTN, HLD, COPD, skin cancer who is admitted for workup of acutely altered mental status in the s/o worsening dementia. He has spent 6 night(s) in the hospital.    24 Hour Events: Patient needs frequent reorienting but responds to re-directioning. Alzheimer's dementia w/ behavioral disturbance: Acutely worsened from baseline. CT/MRI/MRA abd lab w/u unremarkable to date. Neurology recommended initiation of antipsychotic. Currently not requiring restraints. - Family expressing wishes to pursue home hospice vs long term care   - CM sending SNF referrals  - Seroquel PO 25 mg qhs added 1/14 (has not required)      Elevated troponin: POA troponin of 10. Low concern for ACS given patient history. Repeat 14.      UA positive for blood: Concern for rhabdomyolysis vs underlying malignancy (bladder vs RCC). CK mildly elevated at 415. S/p NS IVF. - Consider uro follow up as outpatient      Elevated bilirubin: Unclear etiology. In past, has had elevated TB up to 2.8. Pt with h/o cholecystectomy. Indirect bili 1.5, direct 0.3.   - Daily CMP     DM2: A1c 6.7. Not on meds, per wife. - ACHS SSI, normal sensitivity, w/ POC glucose checks     Hypertension: Chronic, stable. Not on meds per wife. - Monitor     COPD: Chronic, stable. Per wife, using Symbicort and ProAir PRN, with increased use recently. Infection less likely given pro-jess <0.05.  - Continue home Symbicort  - Albuterol PRN     HLD:  POA lipids , HDL 55, , . Not on meds  - Consider outpt statin therapy     MDD/DENISE: Not on meds, per pt wife, on Zoloft in past - however pt's wife said he d/c'ed use after not noticing much of a difference. Nicotine dependence: pt is a lifelong tobacco dip user.   - Daily nicotine patch 21mg/24hr        FEN/GI - Regular diet.  Activity - Ambulate with assistance. DVT prophylaxis - Lovenox. GI prophylaxis - Not indicated at this time. Fall prophylaxis - Fall precautions in place. Disposition - Plan to d/c to SNF vs Hospice. Consulting PT/OT, CM, Palliative  Code Status - Full. Discussed with patient / caregivers. I appreciate the opportunity to participate in the care of this patient,  Jesus Ramirez MD  [facility] Medicine Resident         Subjective / Objective     Subjective:  Patient sitting up in bed with nursing at bedside as he was trying to get out of bed. Patient stated \"just slap me across the face\". He was able to be redirected and laid back down in bed calmly. Objective:   Visit Vitals  /85 (BP 1 Location: Left upper arm, BP Patient Position: At rest)   Pulse 73   Temp 98.2 °F (36.8 °C)   Resp 19   Ht 6' (1.829 m)   Wt 179 lb (81.2 kg)   SpO2 97%   BMI 24.28 kg/m²        Physical Exam  Constitutional:       General: He is not in acute distress. Comments: Speech mostly incomprehensible. Cardiovascular:      Rate and Rhythm: Normal rate and regular rhythm. Pulses: Normal pulses. Heart sounds: Normal heart sounds. Pulmonary:      Effort: Pulmonary effort is normal.      Breath sounds: Normal breath sounds. Abdominal:      General: Abdomen is flat. Palpations: Abdomen is soft. Musculoskeletal:      Right lower leg: No edema. Left lower leg: No edema. Skin:     General: Skin is warm and dry. Neurological:      Mental Status: He is disoriented and confused. Psychiatric:         Behavior: Behavior is not agitated or aggressive. Cognition and Memory: Cognition is impaired. Memory is impaired.           Inpatient Medications  Current Facility-Administered Medications   Medication Dose Route Frequency    melatonin tablet 3 mg  3 mg Oral QHS    LORazepam (ATIVAN) injection 2 mg  2 mg IntraMUSCular Q4H PRN    arformoterol 15 mcg/budesonide 0.5 mg neb solution Nebulization BID PRN    sodium chloride (NS) flush 5-40 mL  5-40 mL IntraVENous Q8H    sodium chloride (NS) flush 5-40 mL  5-40 mL IntraVENous PRN    acetaminophen (TYLENOL) tablet 650 mg  650 mg Oral Q6H PRN    Or    acetaminophen (TYLENOL) suppository 650 mg  650 mg Rectal Q6H PRN    polyethylene glycol (MIRALAX) packet 17 g  17 g Oral DAILY PRN    ondansetron (ZOFRAN ODT) tablet 4 mg  4 mg Oral Q8H PRN    Or    ondansetron (ZOFRAN) injection 4 mg  4 mg IntraVENous Q6H PRN    enoxaparin (LOVENOX) injection 40 mg  40 mg SubCUTAneous DAILY    glucose chewable tablet 16 g  4 Tablet Oral PRN    dextrose (D50W) injection syrg 12.5-25 g  12.5-25 g IntraVENous PRN    glucagon (GLUCAGEN) injection 1 mg  1 mg IntraMUSCular PRN    albuterol (PROVENTIL VENTOLIN) nebulizer solution 2.5 mg  2.5 mg Nebulization Q6H PRN    insulin lispro (HUMALOG) injection   SubCUTAneous AC&HS    QUEtiapine (SEROquel) tablet 25 mg  25 mg Oral QHS    nicotine (NICODERM CQ) 14 mg/24 hr patch 1 Patch  1 Patch TransDERmal DAILY         Allergies  Allergies   Allergen Reactions    Pcn [Penicillins] Rash    Sulfa (Sulfonamide Antibiotics) Rash    Demerol [Meperidine] Nausea and Vomiting     Severe vomiting         CBC:  Recent Labs     01/18/22  0630 01/15/22  0829   WBC 8.1 6.4   HGB 14.7 15.6   HCT 41.8 44.3    760       Metabolic Panel:  Recent Labs     01/18/22  0630 01/15/22  0829    141   K 3.5 3.8    110*   CO2 29 25   BUN 11 16   CREA 0.87 0.95   * 155*   CA 9.1 9.0   MG 2.1 2.3   ALB  --  3.4*   ALT  --  28              For Billing    Chief Complaint   Patient presents with    Altered mental status       Hospital Problems  Date Reviewed: 10/27/2021          Codes Class Noted POA    * (Principal) AMS (altered mental status) ICD-10-CM: R41.82  ICD-9-CM: 780.97  1/12/2022 Unknown        Type 2 diabetes mellitus with hyperglycemia, without long-term current use of insulin (HCC) (Chronic) ICD-10-CM: E11.65  ICD-9-CM: 250.00, 790.29  7/5/2017 Yes        Dementia without behavioral disturbance ([facility] ICD-10-CM: F03.90  ICD-9-CM: 294.20  4/6/2016 Yes        PUD (peptic ulcer disease) (Chronic) ICD-10-CM: K27.9  ICD-9-CM: 533.90  3/30/2016 Yes        Iron deficiency anemia (Chronic) ICD-10-CM: D50.9  ICD-9-CM: 280.9  3/30/2016 Yes        COPD (chronic obstructive pulmonary disease) (HCC) (Chronic) ICD-10-CM: J44.9  ICD-9-CM: 496  3/30/2016 Yes        HTN (hypertension) (Chronic) ICD-10-CM: I10  ICD-9-CM: 401.9  3/30/2016 Yes

## 2022-01-18 NOTE — PROGRESS NOTES
Palliative Medicine      Code Status: DNR    Advance Care Planning:  Advance Care Planning 1/18/2022   Patient's Healthcare Decision Maker is: Legal Next of Raji 69   Primary Decision Maker Name Mahogany Bautista   Primary Decision Maker Phone Number [phone]    Primary Decision Maker Relationship to Patient Wife   Confirm Advance Directive None   Patient Would Like to Complete Advance Directive Unable   Does the patient have other document types Do Not Resuscitate       Patient / Family Encounter Documentation    Participants (names): Pt, wife Asia Julian    Narrative: SW was informed by Care Manager that wife was requesting assistance completing DDNR. SW met with pt and wife. Pt was up in recliner in doorway of room, was pleasantly confused though has had periods of significant agitation/combativeness. Wife shared that she and pt have been  close to 48 yrs, have two dtrs, both of whom work in health care (NP, RN). Pt is retired, worked for the [employer], was an EMT, did Search and Rescue. Wife stated pt is typically mild mannered by nature though \"does have a temper. \"  Wife shared that she had not felt safe with pt in the home due to threatening behavior just prior to admission. SW reviewed DDNR form. Wife spoke of the emotions weighing on her as she makes these difficult decisions but was clear in her desire to allow pt a peaceful passing when he reaches the end of his life. Wife inquired about documenting preferences re: artificial nutrition. SW discussed POST form with wife, provided copy for wife to discuss with dtrs before completing. Psychosocial Issues Identified/ Resilience Factors: Caregiver stress, financial concerns, wife is unable to care for pt at home but is unable to afford cost of facilities. Dtrs are supportive. Wife was very receptive to Music Therapy consult, shared that music has always played an important role in pt's life.   No spiritual concerns identified;  has made supportive visit. Caregiver Gray: High burden  Does the caregiver feel confident administering medication? Would depend on pt's behavior   Does the caregiver need any help connecting with community resources? Yes, is exploring options for care moving forward, including possible Medicaid if pt qualifies  Does the caregiver feel confident assisting with activities of daily living? Would depend on pt's behavior     Goals of Care / Plan: DDNR was signed by wife on this date; Family Practice was notified that form was left on chart for provider's signature. SW will follow up with wife tomorrow or later this week to complete POST form once wife has reviewed with dtrs. Care Manager is assisting with discharge plans. SW will follow for support. Discussed with Family Practice, CM, Music Therapist.    Thank you for including Palliative Medicine in the care of Mr. Chávez.      Rhona Manjarrez LCSW, [facility]-  [phone] = understands/communicates without difficulty

## 2022-01-18 NOTE — PROGRESS NOTES
[phone] [id number]     1945  68 y.o.  male    Patient Telephone Number: [phone] (home)   Worship Affiliation: Heavenly Evelin   Language: English   Patient Active Problem List    Diagnosis Date Noted    AMS (altered mental status) 01/12/2022    High cholesterol 10/27/2021    [facility] 10/26/2018    Type 2 diabetes mellitus with hyperglycemia, without long-term current use of insulin [facility] 07/05/2017    Osteoarthritis 03/06/2017    Advance care planning 12/05/2016    Dementia without behavioral disturbance ([facility] 04/06/2016    PUD (peptic ulcer disease) 03/30/2016    Iron deficiency anemia 03/30/2016    Candidal esophagitis ([facility] 03/30/2016    COPD (chronic obstructive pulmonary disease) ([facility] 03/30/2016    HTN (hypertension) 03/30/2016    Blood loss anemia 03/28/2016    GI bleed 03/28/2016        Date: 1/18/2022            Total Time (in minutes): 5          SFM  MED SURG 2    Mental Status:   [x] Alert [  ] Forgetful [x]  Confused  [  ] Minimally responsive  [  ] Sleeping    Communication Status: [  ] Impaired Speech [  ] Nonverbal -N/A    Physical Status:   [  ] Oxygen in use  [  ] Hard of Hearing [  ] Vision Impaired  [  ] Ambulatory  [  ] Ambulatory with assistance [  ] Non-ambulatory -N/A    Music Preferences, Background: N/A: Please see Session Observations below. Clinical Problem addressed: N/A: Please see Session Observations below. Goal(s) met in session: N/A: Please see Session Observations below.   Physical/Pain management (Scale of 1-10):    Pre-session rating ___________    Post-session rating __________  [  ] Increased relaxation   [  ] Affected breathing patterns  [  ] Decreased muscle tension   [  ] Decreased agitation  [  ] Affected heart rate    [  ] Increased alertness     Emotional/Psychological:  [  ] Increased self-expression   [  ] Decreased aggressive behavior   [  ] Decreased feelings of stress  [  ] Discussed healthy coping skills     [  ] Improved mood    [  ] Decreased withdrawn behavior     Social:  [  ] Decreased feelings of isolation/loneliness [  ] Positive social interaction   [  ] Provided support and/or comfort for family/friends    Spiritual:  [  ] Spiritual support    [  ] Expressed peace  [  ] Expressed jadon    [  ] Discussed beliefs    Techniques Utilized (Check all that apply): N/A: Please see Session Observations below. [  ] Procedural support MT [  ] Music for relaxation [  ] Patient preferred music  [  ] Isabella analysis  [  ] Song choice  [  ] Music for validation  [  ] Entrainment  [  ] Movement to music [  ] Guided visualization  [  ] Jalaine Oyster  [  ] Patient instrument playing [  ] Ada Labella writing  [  ] [facility] along   [  ] Delisa Prose  [  ] Sensory stimulation  [  ] Active Listening  [  ] Music for spiritual support [  ] Making of CDs as gifts    Session Observations:  Referral from JONATHON Olvera, Palliative . Patient (pt) was alert sitting in a chair and a female visitor was sitting next to him. This music therapist (MT) attempted to offer music therapy but saw that the Palliative  was having a conversation with the pt and his visitor. Will follow as able.     Sobeida Guzman MT-BC (Music Therapist-Board Certified)  Spiritual Care Department  Referral-based service

## 2022-01-18 NOTE — PROGRESS NOTES
Music Therapy [facility] 240 [phone]     1945  68 y.o.  male    Patient Telephone Number: [phone] (home)   Moravian Affiliation: Tesfaye Negrete   Language: English   Patient Active Problem List    Diagnosis Date Noted    AMS (altered mental status) 01/12/2022    High cholesterol 10/27/2021    Type 2 diabetes with nephropathy ([facility] 10/26/2018    Type 2 diabetes mellitus with hyperglycemia, without long-term current use of insulin ([facility] 07/05/2017    Osteoarthritis 03/06/2017    Advance care planning 12/05/2016    Dementia without behavioral disturbance ([facility] 04/06/2016    PUD (peptic ulcer disease) 03/30/2016    Iron deficiency anemia 03/30/2016    Candidal esophagitis ([facility] 03/30/2016    COPD (chronic obstructive pulmonary disease) ([facility] 03/30/2016    HTN (hypertension) 03/30/2016    Blood loss anemia 03/28/2016    GI bleed 03/28/2016        Date: 1/18/2022            Total Time (in minutes): 17          SFM  MED SURG 2    Mental Status:   [x] Alert [  ] [place] [  ]  Confused  [  ] Minimally responsive  [  ] Sleeping    Communication Status: [  ] Impaired Speech [  ] Nonverbal -N/A    Physical Status:   [  ] Oxygen in use  [  ] Hard of Hearing [  ] Vision Impaired  [  ] Ambulatory  [  ] Ambulatory with assistance [  ] Non-ambulatory -N/A    Music Preferences, Background: Pt said he likes everything. His spouse confirmed he likes a wide range of genres, giving the example of 'from Country to Classical.' She also shared pt likes Traditional Hymns. Pt's spouse shared that the pt played a wind instrument in the marching band in school. Clinical Problem addressed: Emotional support for pt/family.     Goal(s) met in session:  Physical/Pain management (Scale of 1-10):    Pre-session rating ___________    Post-session rating __________  [  ] Increased relaxation   [  ] Affected breathing patterns  [  ] Decreased muscle tension   [  ] Decreased agitation  [ ] Affected heart rate    [  ] Increased alertness     Emotional/Psychological:  [x] Increased self-expression   [  ] Decreased aggressive behavior   [  ] Decreased feelings of stress  [x] Discussed healthy coping skills     [  ] Improved mood    [  ] Decreased withdrawn behavior     Social:  [  ] Decreased feelings of isolation/loneliness [  ] Positive social interaction   [x] Provided support and/or comfort for family/friends    Spiritual:  [x] Spiritual support    [  ] Expressed peace  [  ] Expressed jadon    [  ] Discussed beliefs    Techniques Utilized (Check all that apply):   [  ] Procedural support MT [x] Music for relaxation [x] Patient preferred music  [  ] Isabella analysis  [  ] Song choice  [  ] Music for validation  [  ] Entrainment  [x] Movement to music [  ] Guided visualization  [  ] Lendell Rump  [  ] Patient instrument playing [  ] Clare Neighbours writing  [x] Sing along   [  ] Hulon Grates  [  ] Sensory stimulation  [x] Active Listening  [x] Music for spiritual support [  ] Making of CDs as gifts    Session Observations:  F/up visit; Patient (pt) was moved from the chair back into bed. His spouse was at bedside. This music therapist (MT) offered to complete this music therapy session that needed to be paused earlier and pt readily agreed. MT sang and played the hymn It is Well with My Soul with guitar. Pt attempted to open a granola bar, and hummed along to parts of the hymn as he did. Pt and his spouse expressed enjoyment in the music. MT asked the pt and his spouse if the pt had a favorite hymn. Pt's spouse suggested How Great Thou Art. MT sang and played this with guWhoteverr. Pt sang along and his spouse was tearful in response to the music. During the first chorus, the pt, it appeared upon seeing his spouse become tearful, reached his hand out to her. Pt's spouse stood and held the pt hand lovingly. Pt then struggled again to open the granola bar, and his spouse assisted him with this.  Pt ate this as his spouse expressed gratitude for the session. She said it was meaningful to hear the songs and to see the pt respond to the music. She increased self-expression, tearfully expressing the difficulties of the pt's health decline, her gratitude for having her daughters' support and acknowledging the loss they were all experiencing around the pt's dementia. MT provided active listening and words of validation. Pt's spouse said she and their entire family enjoys listening to music to help with coping and MT offered words of support.     Janet Perry MT-BC (Music Therapist-Board Certified)  Spiritual Care Department  Referral-based service

## 2022-01-18 NOTE — PROGRESS NOTES
1/18/2022  Case Management Progress Note    4:52 PM  No updates at this time, also called Netta Lozano at [facility] ([phone]). He did not answer and unable to leave a message at this time. Will try again in the morning. ROD Jacques     1:34 PM  Patient is 68year old male admitted 1/12 with altered mental status  Patient's RUR is 9% green/low risk for readmission  Covid test: negative 1/17  Chart reviewed--patient discussed at IDR rounds  Patient has been denied by Ric Chowdhury, [address], and the [facility]. Referrals are still pending to [facility] Brunson, [place] [address], [facility] and [facility]. The [facility] is extremely tight on beds right now, but I have spoken with Cam (liaison at [place] [address]) and she is reviewing. Patient is not currently restrained and appreciate hospice and palliative input. Patient's wife is concerned about getting DNR signed--patient currently does have an inpatient DNR order, and will need a DDNR signed prior to discharge and MD is aware. Will reassure patient's wife that patient is currently a DNR and we will get the [facility] signed prior to discharge. I also sent a message to [facility] with Palliative team letting her know about wife's concern. Transition of Care Plan   1. Continue medical management/treatment  2. SNF referrals pending  3. Patient now a DNR, sent message to Palliative SW about wife's concern as well  4. Transportation will likely need to be stretcher   5.  CM will continue to follow    ROD Jacques

## 2022-01-18 NOTE — PROGRESS NOTES
Hospice medical director    Chart reviewed and discussed with hospice liaisonSusie yesterday and then [facility] today. Very difficult situation as patient appears to have frontotemporal dementia which typically causes significant behavioral disturbances and also does not follow the normal trajectory of typical Alzheimer's dementia. Patient clearly having issues with behavioral disturbances in the hospital and this appears to been a fairly rapid decline per all the notes. Having said that, he still is ambulating apparently is eating so I could not justify general inpatient level of care at this time. I do think he could be appropriate for hospice care at routine level care either in a facility or at home given the frontotemporal dementia. In this clinical scenario, I would recommend being more aggressive with medication management if patient will allow. Consider starting Seroquel at minimal 25 mg twice a day but given his size, would consider even starting at 50 mg twice a day for comfort. Could also consider IM Geodon 20 mg twice daily to see if this provides better control of his behavioral disturbances. While these medications do come with warnings about increased risk of stroke, if his wife's focus remains comfort, I think the benefit outweighs the risk and we certainly use these medications on a regular basis in hospice care.

## 2022-01-19 NOTE — PROGRESS NOTES
Bedside and Verbal shift change report given to Rupert Milligan RN (oncoming nurse) by [facility], RN (offgoing nurse). Report included the following information SBAR, Kardex, Intake/Output, MAR and Recent Results.

## 2022-01-19 NOTE — PROGRESS NOTES
1/19/2022  Case Management Progress Note    12:18 PM  Patient is 68year old male admitted 1/12 with altered mental status  Patient's RUR is 10% green/low risk for readmission  Covid test: negative 1/17  Chart reviewed--patient discussed at IDR rounds  Appreciate help of CM  Lynne Sanchez in the disposition planning of this patient. We are awaiting a call back from liaison Bella Salazar from [address] who may be able to take the patient. He does not need any new covid testing as far as we know. Patient will need stretcher transport due to confusion. Spoke with Lynne Sanchez about referral to [facility] as requested by MD--we will see how [facility] pans out and go from there. Transition of Care Plan  1. Continue medical management  2. Working on SNF placement--[facility] may take  3. Stretcher transport for discharge   4.  CM will continue to follow and update    ROD Lantigua

## 2022-01-19 NOTE — ACP (ADVANCE CARE PLANNING)
Advance Care Planning 1/19/2022   Patient's Healthcare Decision Maker is: Legal Next of Raji Blanca   Primary Decision Maker Name Abram Guillaume   Primary Decision Maker Phone Number [phone]   Primary Decision Maker Relationship to Patient Wife   Confirm Advance Directive None   Patient Would Like to Complete Advance Directive Unable   Does the patient have other document types Do Not Resuscitate;MOST/MOLST/POST/POLST     DDNR and POST forms have been signed by wife on pt's behalf. Originals were placed on chart to be sent out with pt at time of discharge. Copies were placed on chart to be scanned into medical record, copies were returned to wife.

## 2022-01-20 NOTE — PROGRESS NOTES
Bedside and Verbal shift change report given to HARJIT Anguiano (oncoming nurse) by John Whitmore RN (offgoing nurse). Report included the following information SBAR, Kardex, Intake/Output, MAR and Recent Results.

## 2022-01-20 NOTE — PROGRESS NOTES
[address] [place] [address]   Office [phone]  Fax [phone]          Assessment and Plan     Kaur Bull is a 68 y.o. male with a PMHx of Alzheimer's disease, DM2, HTN, HLD, COPD, skin cancer who is admitted for workup of acutely altered mental status in the s/o worsening dementia. He has spent 8 night(s) in the hospital.    24 Hour Events: Signed DDNR and POST forms per wife's request. Awaiting SNF placement - potentially at [facility]. Alzheimer's dementia w/ behavioral disturbance: Acutely worsened from baseline. MRI w/ central atrophy. Neurology recommended initiation of antipsychotic. Per review, not taking Aricept 10mg outpatient. Currently not requiring restraints, patient easily redirectable. - Family expressing wishes to pursue SNF until further care decisions can be made  - CM sending SNF referrals  - Seroquel 25mg BID   - Palliative following, appreciate recommendations  - Melatonin prn for insomnia  - Labs spaced to F    DM2: A1c 6.7. Per chart review, Metformin 1000mg qhs outpatient. BG remain elevated during admission, so started on lantus.   - Lantus 5U QHS  - ACHS SSI, normal sensitivity, w/ POC glucose checks    Hypertension: Chronic, stable. Not on meds per wife but losartan-HCTZ listed as home med. - BP have been stable w/o intervention, continue to monitor       COPD: Chronic, stable. Per wife, using Symbicort and ProAir PRN, with increased use recently. - Symbicort neb BID PRN  - Albuterol neb PRN      HLD:  TChol 206, .8 (1/13/22). Not taking home Zocor 20mg. Unable to calculate ASCVD risk given age. - Consider restarting outpatient statin therapy with PCP    MDD/DENISE: Not on meds, per pt wife. On Zoloft in past, however pt's wife said he d/c'ed use after not noticing much of a difference. Nicotine dependence: Lifelong tobacco dip user. - Daily nicotine patch 21mg/24hr     Elevated troponin (stable): 10 >14.  EKG NSR with occassional PVCs, no ST segment changes.      UA positive for blood: Initially concerned for rhabdomyolysis, CK only mildly elevated at 415. S/p IVF on admission.   - Consider uro follow up as outpatient if persists on 6wk f/u UA     Elevated bilirubin: Unclear etiology. In past, has had elevated TB up to 2.8. Pt with h/o cholecystectomy. Indirect bili 1.5, direct 0.3. No appearance of jaundice.  - Consider outpt workup  - CMP MWF           FEN/GI - Carb restricted diet. Activity - Ambulate with assistance. DVT prophylaxis - Lovenox. GI prophylaxis - Not indicated at this time. Fall prophylaxis - Fall precautions in place. Disposition - Plan to d/c to SNF vs Hospice. Consulting PT/OT, CM, Palliative  Code Status - DNR. Discussed with patient / caregivers. I appreciate the opportunity to participate in the care of this patient,  Autumn Hoang MD  [facility] Medicine Resident         Subjective / Objective     Subjective:  Patient examined at chair. No complaints. Objective:   Visit Vitals  /78 (BP 1 Location: Right upper arm, BP Patient Position: At rest)   Pulse 70   Temp 97.9 °F (36.6 °C)   Resp 14   Ht 6' (1.829 m)   Wt 179 lb (81.2 kg)   SpO2 96%   BMI 24.28 kg/m²        Physical Exam  Constitutional:       General: He is not in acute distress. Comments:      Cardiovascular:      Rate and Rhythm: Normal rate and regular rhythm. Pulses: Normal pulses. Heart sounds: Normal heart sounds. Pulmonary:      Effort: Pulmonary effort is normal.      Breath sounds: Normal breath sounds. Abdominal:      General: Abdomen is flat. Palpations: Abdomen is soft. Musculoskeletal:      Right lower leg: No edema. Left lower leg: No edema. Skin:     General: Skin is warm and dry. Neurological:      Mental Status: He is disoriented and confused. Psychiatric:         Behavior: Behavior is not agitated or aggressive. Cognition and Memory: Cognition is impaired. Memory is impaired. Date 01/19/22 0700 - 01/20/22 0659 01/20/22 0700 - 01/21/22 0659   Shift [id number] 1900-0659 24 Hour Total 7321-1177 2422-5537 24 Hour Total   INTAKE   Shift Total(mL/kg)         OUTPUT   Urine(mL/kg/hr)           Urine Occurrence(s)  2 x 2 x      Shift Total(mL/kg)         NET         Weight (kg) 81.2 81.2 81.2 81.2 81.2 81.2   Inpatient Medications  Current Facility-Administered Medications   Medication Dose Route Frequency    lidocaine 4 % patch 1 Patch  1 Patch TransDERmal Q24H    insulin glargine (LANTUS) injection 5 Units  5 Units SubCUTAneous QHS    LORazepam (ATIVAN) injection 1 mg  1 mg IntraMUSCular Q4H PRN    QUEtiapine (SEROquel) tablet 25 mg  25 mg Oral BID    melatonin tablet 3 mg  3 mg Oral QHS    arformoterol 15 mcg/budesonide 0.5 mg neb solution   Nebulization BID PRN    sodium chloride (NS) flush 5-40 mL  5-40 mL IntraVENous Q8H    sodium chloride (NS) flush 5-40 mL  5-40 mL IntraVENous PRN    acetaminophen (TYLENOL) tablet 650 mg  650 mg Oral Q6H PRN    Or    acetaminophen (TYLENOL) suppository 650 mg  650 mg Rectal Q6H PRN    polyethylene glycol (MIRALAX) packet 17 g  17 g Oral DAILY PRN    ondansetron (ZOFRAN ODT) tablet 4 mg  4 mg Oral Q8H PRN    Or    ondansetron (ZOFRAN) injection 4 mg  4 mg IntraVENous Q6H PRN    enoxaparin (LOVENOX) injection 40 mg  40 mg SubCUTAneous DAILY    glucose chewable tablet 16 g  4 Tablet Oral PRN    dextrose (D50W) injection syrg 12.5-25 g  12.5-25 g IntraVENous PRN    glucagon (GLUCAGEN) injection 1 mg  1 mg IntraMUSCular PRN    albuterol (PROVENTIL VENTOLIN) nebulizer solution 2.5 mg  2.5 mg Nebulization Q6H PRN    insulin lispro (HUMALOG) injection   SubCUTAneous AC&HS    nicotine (NICODERM CQ) 14 mg/24 hr patch 1 Patch  1 Patch TransDERmal DAILY         Allergies  Allergies   Allergen Reactions    Pcn [Penicillins] Rash    Sulfa (Sulfonamide Antibiotics) Rash    Demerol [Meperidine] Nausea and Vomiting     Severe vomiting CBC:  Recent Labs     01/18/22  0630   WBC 8.1   HGB 14.7   HCT 41.8          Metabolic Panel:  Recent Labs     01/18/22  0630      K 3.5      CO2 29   BUN 11   CREA 0.87   *   CA 9.1   MG 2.1              For Billing    Chief Complaint   Patient presents with    Altered mental status       Hospital Problems  Date Reviewed: 10/27/2021          Codes Class Noted POA    * (Principal) AMS (altered mental status) ICD-10-CM: R41.82  ICD-9-CM: 780.97  1/12/2022 Unknown        Type 2 diabetes mellitus with hyperglycemia, without long-term current use of insulin (HCC) (Chronic) ICD-10-CM: E11.65  ICD-9-CM: 250.00, 790.29  7/5/2017 Yes        Dementia without behavioral disturbance ([facility] ICD-10-CM: F03.90  ICD-9-CM: 294.20  4/6/2016 Yes        PUD (peptic ulcer disease) (Chronic) ICD-10-CM: K27.9  ICD-9-CM: 533.90  3/30/2016 Yes        Iron deficiency anemia (Chronic) ICD-10-CM: D50.9  ICD-9-CM: 280.9  3/30/2016 Yes        COPD (chronic obstructive pulmonary disease) (HCC) (Chronic) ICD-10-CM: J44.9  ICD-9-CM: 496  3/30/2016 Yes        HTN (hypertension) (Chronic) ICD-10-CM: I10  ICD-9-CM: 401.9  3/30/2016 Yes

## 2022-01-20 NOTE — PROGRESS NOTES
Problem: Non-Violent Restraints  Goal: Removal from restraints as soon as assessed to be safe  Outcome: Not Progressing Towards Goal  Goal: No harm/injury to patient while restraints in use  Outcome: Not Progressing Towards Goal  Goal: Patient's dignity will be maintained  Outcome: Not Progressing Towards Goal  Goal: Patient Interventions  Outcome: Not Progressing Towards Goal     Problem: Pressure Injury - Risk of  Goal: *Prevention of pressure injury  Description: Document Tomas Scale and appropriate interventions in the flowsheet.   Outcome: Not Progressing Towards Goal  Note: Pressure Injury Interventions:  Sensory Interventions: Assess changes in LOC,Assess need for specialty bed    Moisture Interventions: Absorbent underpads    Activity Interventions: Increase time out of bed    Mobility Interventions: Assess need for specialty bed    Nutrition Interventions: Document food/fluid/supplement intake    Friction and Shear Interventions: Apply protective barrier, creams and emollients,HOB 30 degrees or less

## 2022-01-20 NOTE — PROGRESS NOTES
Occupational Therapy: Chart reviewed and consulted with team members. Patient recently assisted back to bed by 3 staff members, and patient sleeping soundly at time of attempt for OT services. Defer OT today and follow up as able per POC.    Anant Morales, OTR/L

## 2022-01-20 NOTE — PROGRESS NOTES
1/20/2022  Case Management Progress Note    3:54 PM  Patient is 68year old male admitted 1/12 with altered mental status  Patient's RUR is 10% green/low risk for readmission  Covid test: negative 1/17  Chart reviewed--patient discussed at [facility] SNF is considering patient, at last check they asked if he was vaccinated (he is). Appreciate the help of CM  Carlyn Iyer with this patient, she has been communicating with Andrea Stone, the admissions liaison out there at [facility]. Patient did work with PT today however had decreased ability today, unclear whether due to objective weakness or subjective resistance. Will continue to follow. Transition of Care Plan   1. Continue medical management/treatment  2. Working on SNF placement  3. Patient will eventually need long term care  4. Transportation--likely stretcher  5.  CM will continue to follow    ROD Monahan

## 2022-01-20 NOTE — PROGRESS NOTES
Problem: Mobility Impaired (Adult and Pediatric)  Goal: *Acute Goals and Plan of Care (Insert Text)  Description: FUNCTIONAL STATUS PRIOR TO ADMISSION: Patient was modified independent  for functional mobility up to 2days PTA    HOME SUPPORT PRIOR TO ADMISSION: The patient lived with wife but did not require assist per wife. Pt did not bathe in shower. Per chart review,. Herbert Torrez Herbertcharlie Torrez Dtr stating that pt was confused at time and urinating in corner of rooms. Physical Therapy Goals  Initiated 1/14/2022  1. Patient will move from supine to sit and sit to supine  in bed with supervision/set-up within 7 day(s). 2.  Patient will transfer from bed to chair and chair to bed with minimal assistance/contact guard assist using the least restrictive device within 7 day(s). 3.  Patient will perform sit to stand with minimal assistance/contact guard assist within 7 day(s). 4.  Patient will ambulate with minimal assistance/contact guard assist for 100 feet with the least restrictive device within 7 day(s). 5.  Patient will ascend/descend 2 stairs with 2 handrail(s) with minimal assistance/contact guard assist within 7 day(s). Outcome: Progressing Towards Goal   PHYSICAL THERAPY TREATMENT  Patient: Umza Calix (56 y.o. male)  Date: 1/20/2022  Diagnosis: AMS (altered mental status) [R41.82] AMS (altered mental status)       Precautions: Fall  Chart, physical therapy assessment, plan of care and goals were reviewed. ASSESSMENT  Patient continues with skilled PT services and is not progressing towards goals with inconsistent gains. Pt observed leaning to R in saroj-chair recliner, disheveled without hospital socks attempting to eat lunch, yet spilling on self. Provided socks and assistance with meal. Pt with inablility to cut food. Recommending assistance  via nursing staff with meals and/or soft diet. RN called to assist with transfer out of chair to bed since pt has been up all morning per RN.   Explained skin concerns to RN. Waffle cushion provided recommending for nursing staff to place under pt and to limit sitting tolerance. Also recommended sitter for this pt. Lift team called to assist with Total Ax2-3 to get pt back into bed. Pt resistant to help. Pt went to sleep immediately when in supine. SNF placement needed and 24hr care. Current Level of Function Impacting Discharge (mobility/balance): Total Ax2/poor    Other factors to consider for discharge: per above         PLAN :  Patient continues to benefit from skilled intervention to address the above impairments. Continue treatment per established plan of care. to address goals. Recommendation for discharge: (in order for the patient to meet his/her long term goals)  SNF setting    This discharge recommendation:  Has been made in collaboration with the attending provider and/or case management    IF patient discharges home will need the following DME: none       SUBJECTIVE:   Patient stated in audible words.     OBJECTIVE DATA SUMMARY:   Critical Behavior:  Neurologic State: Confused  Orientation Level: Oriented to person  Cognition: Decreased command following  Safety/Judgement: Lack of insight into deficits  Functional Mobility Training:  Bed Mobility:        Sit to Supine: Total assistance;Assist x2  Scooting: Total assistance;Assist x2        Transfers:  Sit to Stand: Total assistance;Maximum assistance;Assist x2  Stand to Sit: Total assistance;Maximum assistance;Assist x2                             Balance:  Sitting: Impaired  Sitting - Static: Poor (constant support)  Sitting - Dynamic: Poor (constant support)  Standing: Impaired  Standing - Static: Poor  Standing - Dynamic : Poor  Ambulation/Gait Training:  Distance (ft): 1 Feet (ft)     Ambulation - Level of Assistance:  Total assistance;Maximum assistance;Assist x2                                               Activity Tolerance:   Poor    After treatment patient left in no apparent distress: Supine in bed, Call bell within reach, Bed / chair alarm activated, and Side rails x 3    COMMUNICATION/COLLABORATION:   The patients plan of care was discussed with: Registered nurse.      Johnson Casillas, PT   Time Calculation: 25 mins

## 2022-01-20 NOTE — PROGRESS NOTES
Spoke with [facility]. He stated that DERRICK is requesting additional information/physician documentation of last recorded wandering episode. Spoke with nursing and chart reviewed - last episode was on Sunday 1/16. Physician notified of above.  Aundra Denver, MSW

## 2022-01-20 NOTE — PROGRESS NOTES
Bedside, Verbal and Written shift change report given to [address] (oncoming nurse) by Alejandra Jimenez (offgoing nurse). Report included the following information SBAR, Kardex, ED Summary, Procedure Summary, Intake/Output, MAR, Recent Results and Med Rec Status.

## 2022-01-21 NOTE — PROGRESS NOTES
[address] [address]   Office [phone]  Fax [phone]          Assessment and Plan     Ena Horne is a 68 y.o. male with a PMHx of Alzheimer's disease, DM2, HTN, HLD, COPD, skin cancer who is admitted for workup of acutely altered mental status in the s/o worsening dementia. He has spent 9 night(s) in the hospital.    24 Hour Events: Awaiting SNF placement - potentially at [facility]. Alzheimer's dementia w/ behavioral disturbance: Acutely worsened from baseline. MRI w/ central atrophy. Neurology recommended initiation of antipsychotic. Per review, not taking Aricept 10mg outpatient. Currently not requiring restraints, patient easily redirectable, lasts wandering episode 1/16 PM.  - Family expressing wishes to pursue SNF until further care decisions can be made  - CM sending SNF referrals  - Seroquel 25mg BID   - Palliative following, appreciate recommendations  - Melatonin prn for insomnia  - Labs spaced to [facility]    DM2: A1c 6.7. Per chart review, Metformin 1000mg qhs outpatient. BG remain elevated during admission, so started on lantus.   - Lantus 5U QHS  - ACHS SSI, normal sensitivity, w/ POC glucose checks    Hypertension: Chronic, stable. Not on meds per wife but losartan-HCTZ listed as home med. - BP have been stable w/o intervention, continue to monitor       COPD: Chronic, stable. Per wife, using Symbicort and ProAir PRN, with increased use recently. - Symbicort neb BID PRN  - Albuterol neb PRN      HLD:  TChol 206, .8 (1/13/22). Not taking home Zocor 20mg. Unable to calculate ASCVD risk given age. - Consider restarting outpatient statin therapy with PCP    MDD/DENISE: Not on meds, per pt wife. On Zoloft in past, however pt's wife said he d/c'ed use after not noticing much of a difference. Nicotine dependence: Lifelong tobacco dip user. - Daily nicotine patch 21mg/24hr     Elevated troponin (stable): 10 >14.  EKG NSR with occassional PVCs, no ST segment changes.      UA positive for blood: Initially concerned for rhabdomyolysis, CK only mildly elevated at 415. S/p IVF on admission.   - Consider uro follow up as outpatient if persists on 6wk f/u UA     Elevated bilirubin: Unclear etiology. In past, has had elevated TB up to 2.8. Pt with h/o cholecystectomy. Indirect bili 1.5, direct 0.3. No appearance of jaundice.  - Consider outpt workup  - CMP MWF           FEN/GI - Carb restricted diet. Activity - Ambulate with assistance. DVT prophylaxis - Lovenox. GI prophylaxis - Not indicated at this time. Fall prophylaxis - Fall precautions in place. Disposition - Plan to d/c to SNF vs Hospice. Consulting PT/OT, CM, Palliative  Code Status - DNR. Discussed with patient / caregivers. I appreciate the opportunity to participate in the care of this patient,  Elizabeth Keys MD  [facility] Medicine Resident         Subjective / Objective     Subjective:  Patient examined at bedside. Resting comfortably. Pulls blankets back over head when try to examine. Objective:   Visit Vitals  /74 (BP 1 Location: Left upper arm, BP Patient Position: Sitting)   Pulse 72   Temp 97.4 °F (36.3 °C)   Resp 19   Ht 6' (1.829 m)   Wt 179 lb (81.2 kg)   SpO2 98%   BMI 24.28 kg/m²        Physical Exam  Constitutional:       General: He is not in acute distress. Comments:      Cardiovascular:      Rate and Rhythm: Normal rate and regular rhythm. Pulses: Normal pulses. Heart sounds: Normal heart sounds. Pulmonary:      Effort: Pulmonary effort is normal.      Breath sounds: Normal breath sounds. Musculoskeletal:      Right lower leg: No edema. Left lower leg: No edema. Skin:     General: Skin is warm and dry. Neurological:      Mental Status: He is disoriented and confused. Psychiatric:         Behavior: Behavior is not agitated or aggressive. Cognition and Memory: Cognition is impaired. Memory is impaired.           Date 01/20/22 0700 - 01/21/22 3856 01/21/22 0700 - 01/22/22 0659   Shift [id number] 1900-0659 24 Hour Total 0700-1859 1900-0659 24 Hour Total   INTAKE   Shift Total(mL/kg)         OUTPUT   Urine(mL/kg/hr) 0(0)  0(0)        Urine Voided 0  0        Urine Occurrence(s) 1 x  1 x      Emesis/NG output 0  0        Emesis 0  0        Emesis Occurrence(s) 0 x  0 x      Other 0  0        Other Output 0  0      Stool 0  0        Stool Occurrence(s) 0 x  0 x        Stool 0  0      Blood 0  0        Quantitative Blood Loss 0  0        Blood 0  0      Shift Total(mL/kg) 0(0)  0(0)      NET 0  0      Weight (kg) 81.2 81.2 81.2 81.2 81.2 81.2   Inpatient Medications  Current Facility-Administered Medications   Medication Dose Route Frequency    lidocaine 4 % patch 1 Patch  1 Patch TransDERmal Q24H    insulin glargine (LANTUS) injection 5 Units  5 Units SubCUTAneous QHS    LORazepam (ATIVAN) injection 1 mg  1 mg IntraMUSCular Q4H PRN    QUEtiapine (SEROquel) tablet 25 mg  25 mg Oral BID    melatonin tablet 3 mg  3 mg Oral QHS    arformoterol 15 mcg/budesonide 0.5 mg neb solution   Nebulization BID PRN    sodium chloride (NS) flush 5-40 mL  5-40 mL IntraVENous Q8H    sodium chloride (NS) flush 5-40 mL  5-40 mL IntraVENous PRN    acetaminophen (TYLENOL) tablet 650 mg  650 mg Oral Q6H PRN    Or    acetaminophen (TYLENOL) suppository 650 mg  650 mg Rectal Q6H PRN    polyethylene glycol (MIRALAX) packet 17 g  17 g Oral DAILY PRN    ondansetron (ZOFRAN ODT) tablet 4 mg  4 mg Oral Q8H PRN    Or    ondansetron (ZOFRAN) injection 4 mg  4 mg IntraVENous Q6H PRN    enoxaparin (LOVENOX) injection 40 mg  40 mg SubCUTAneous DAILY    glucose chewable tablet 16 g  4 Tablet Oral PRN    dextrose (D50W) injection syrg 12.5-25 g  12.5-25 g IntraVENous PRN    glucagon (GLUCAGEN) injection 1 mg  1 mg IntraMUSCular PRN    albuterol (PROVENTIL VENTOLIN) nebulizer solution 2.5 mg  2.5 mg Nebulization Q6H PRN    insulin lispro (HUMALOG) injection   SubCUTAneous AC&HS    nicotine (NICODERM CQ) 14 mg/24 hr patch 1 Patch  1 Patch TransDERmal DAILY         Allergies  Allergies   Allergen Reactions    Pcn [Penicillins] Rash    Sulfa (Sulfonamide Antibiotics) Rash    Demerol [Meperidine] Nausea and Vomiting     Severe vomiting         CBC:  Recent Labs     01/19/22  0530   WBC 5.4   HGB 14.9   HCT 43.4          Metabolic Panel:  Recent Labs     01/19/22  0530   MG 2.1              For Billing    Chief Complaint   Patient presents with    Altered mental status       Hospital Problems  Date Reviewed: 10/27/2021          Codes Class Noted POA    * (Principal) AMS (altered mental status) ICD-10-CM: R41.82  ICD-9-CM: 780.97  1/12/2022 Unknown        Type 2 diabetes mellitus with hyperglycemia, without long-term current use of insulin (HCC) (Chronic) ICD-10-CM: E11.65  ICD-9-CM: 250.00, 790.29  7/5/2017 Yes        Dementia without behavioral disturbance ([facility] ICD-10-CM: F03.90  ICD-9-CM: 294.20  4/6/2016 Yes        PUD (peptic ulcer disease) (Chronic) ICD-10-CM: K27.9  ICD-9-CM: 533.90  3/30/2016 Yes        Iron deficiency anemia (Chronic) ICD-10-CM: D50.9  ICD-9-CM: 280.9  3/30/2016 Yes        COPD (chronic obstructive pulmonary disease) (HCC) (Chronic) ICD-10-CM: J44.9  ICD-9-CM: 496  3/30/2016 Yes        HTN (hypertension) (Chronic) ICD-10-CM: I10  ICD-9-CM: 401.9  3/30/2016 Yes

## 2022-01-21 NOTE — PROGRESS NOTES
Problem: Mobility Impaired (Adult and Pediatric)  Goal: *Acute Goals and Plan of Care (Insert Text)  Description: FUNCTIONAL STATUS PRIOR TO ADMISSION: Patient was modified independent  for functional mobility up to 2days PTA    HOME SUPPORT PRIOR TO ADMISSION: The patient lived with wife but did not require assist per wife. Pt did not bathe in shower. Per chart review,. Joe Patel Dtr stating that pt was confused at time and urinating in corner of rooms. Physical Therapy Goals  Initiated 1/14/2022; continue same goals as of 1/21/2022  1. Patient will move from supine to sit and sit to supine  in bed with supervision/set-up within 7 day(s). 2.  Patient will transfer from bed to chair and chair to bed with minimal assistance/contact guard assist using the least restrictive device within 7 day(s). 3.  Patient will perform sit to stand with minimal assistance/contact guard assist within 7 day(s). 4.  Patient will ambulate with minimal assistance/contact guard assist for 100 feet with the least restrictive device within 7 day(s). 5.  Patient will ascend/descend 2 stairs with 2 handrail(s) with minimal assistance/contact guard assist within 7 day(s). Outcome: Progressing Towards Goal   PHYSICAL THERAPY TREATMENT: WEEKLY REASSESSMENT  Patient: Anju Winters (89 y.o. male)  Date: 1/21/2022  Primary Diagnosis: AMS (altered mental status) [R41.82]        Precautions:   Fall      ASSESSMENT  Patient continues with skilled PT services and is progressing towards goals. Communicated with nurse cleared for therapy. Patient supine on bed when received. Rolled on the edge of bed mod assist x 2, supine to sit mod assist x 2, sit to stand mod assist x 2, ambulate towards the chair mod assist x 2 hand held assist. Slow pace gait no loss of balance need some verbal and tactile cues for safety, need to redirect patient at times to follow task.  OOB to chair as tolerated performed some active range of motion exercise on both LE all planes. Placed saroj chair near the door facing the nurse  communicated with nurse who agreed to monitor patient. Patient's progression toward goals since last assessment: good    Current Level of Function Impacting Discharge (mobility/balance): mod assist x 2 with transfers and ambulation hand held assist      Functional Outcome Measure: The patient scored 25/100 on the barthel index outcome measure   Other factors to consider for discharge: fall         PLAN :  Goals have been updated based on progression since last assessment. Patient continues to benefit from skilled intervention to address the above impairments. Recommendations and Planned Interventions: bed mobility training, transfer training, gait training, therapeutic exercises, neuromuscular re-education, orthotic/prosthetic training, patient and family training/education, and therapeutic activities      Frequency/Duration: Patient will be followed by physical therapy:  5 times a week to address goals. Recommendation for discharge: (in order for the patient to meet his/her long term goals)  Therapy up to 5 days/week in SNF setting    This discharge recommendation:  Has been made in collaboration with the attending provider and/or case management    IF patient discharges home will need the following DME: patient owns DME required for discharge         SUBJECTIVE:   Patient stated [address]     OBJECTIVE DATA SUMMARY:   HISTORY:    Past Medical History:   Diagnosis Date    COPD (chronic obstructive pulmonary disease) ([facility]     Diabetes ([facility]     Endocrine disease     Essential hypertension     High cholesterol     Osteoarthritis     Skin cancer     Skin disorder     Sun-damaged skin     Sunburn, blistering      Past Surgical History:   Procedure Laterality Date    HX CERVICAL LAMINECTOMY      HX CHOLECYSTECTOMY      HX LUMBAR LAMINECTOMY      HX ORTHOPAEDIC Bilateral     hands and shoulders    HX TONSILLECTOMY         Personal factors and/or comorbidities impacting plan of care:     Home Situation  Home Environment: Private residence  # Steps to Enter: 2  Rails to Enter: Yes  One/Two Story Residence: Two story  Living Alone: No  Support Systems: Spouse/Significant Other  Patient Expects to be Discharged to[de-identified] Skilled nursing facility  Tub or Shower Type: Shower    EXAMINATION/PRESENTATION/DECISION MAKING:   Critical Behavior:  Neurologic State: Alert  Orientation Level: Disoriented X4  Cognition: Decreased attention/concentration,Decreased command following  Safety/Judgement: Lack of insight into deficits  Hearing:    Range Of Motion:  AROM: Generally decreased, functional                       Strength:    Strength: Generally decreased, functional                    Tone & Sensation:                                  Coordination:  Coordination: Generally decreased, functional  Vision:      Functional Mobility:  Bed Mobility:  Rolling: Moderate assistance  Supine to Sit: Moderate assistance  Sit to Supine: Moderate assistance  Scooting: Moderate assistance  Transfers:  Sit to Stand: Moderate assistance;Assist x2  Stand to Sit: Moderate assistance;Assist x2  Stand Pivot Transfers: Moderate assistance;Assist x2     Bed to Chair: Moderate assistance;Assist x2              Balance:   Sitting: Impaired;High guard  Sitting - Static: Fair (occasional)  Sitting - Dynamic: Fair (occasional)  Standing: Impaired; With support  Standing - Static: Constant support; Fair  Standing - Dynamic : Constant support; Fair  Ambulation/Gait Training:  Distance (ft): 2 Feet (ft)  Assistive Device: Gait belt; Other (comment) (hand held assist)  Ambulation - Level of Assistance: Moderate assistance;Assist x2; Additional time     Gait Description (WDL): Exceptions to WDL  Gait Abnormalities: Path deviations; Step to gait        Base of Support: Widened     Speed/Charissa: Fluctuations; Slow  Step Length: Right shortened;Left shortened                      Therapeutic Exercises: Educate and instructed patient to continue active range of motion exercise on both legs while up on chair or on bed multiple times. Recommend patient to be up on the chair at least 3 times a day every meal times as tolerated. Functional Measure:  Barthel Index:    Bathin  Bladder: 0  Bowels: 0  Groomin  Dressin  Feedin  Mobility: 0  Stairs: 0  Toilet Use: 5  Transfer (Bed to Chair and Back): 10  Total: 25/100       The Barthel ADL Index: Guidelines  1. The index should be used as a record of what a patient does, not as a record of what a patient could do. 2. The main aim is to establish degree of independence from any help, physical or verbal, however minor and for whatever reason. 3. The need for supervision renders the patient not independent. 4. A patient's performance should be established using the best available evidence. Asking the patient, friends/relatives and nurses are the usual sources, but direct observation and common sense are also important. However direct testing is not needed. 5. Usually the patient's performance over the preceding 24-48 hours is important, but occasionally longer periods will be relevant. 6. Middle categories imply that the patient supplies over 50 per cent of the effort. 7. Use of aids to be independent is allowed. Score Interpretation (from [address] [address])    Independent   60-79 Minimally independent   40-59 Partially dependent   20-39 Very dependent   <20 Totally dependent     -Savana Dudley., Barthel, D.W. (1965). Functional evaluation: the Barthel Index. [facility] ([address] (). The Barthel activities of daily living index: self-reporting versus actual performance in the old (> or = 75 years). Journal of [address] [place] [address] 45(7), 14 [facility], .KassandraKassandraF, Gustavo Ross., Jake Marie. (1999).  Measuring the change in disability after inpatient rehabilitation; comparison of the responsiveness of the Barthel Index and Functional [place] Measure. Journal of Neurology, Neurosurgery, and Psychiatry, 66(4), 633-166. MARI Matamoros.LEXA, GEOVANNA Mora, & Josué Jorge M.A. (2004) Assessment of post-stroke quality of life in cost-effectiveness studies: The usefulness of the Barthel Index and the EuroQoL-5D. Quality of Life Research, 13, 427-15          Pain Rating:  Barthel Index:    Bathin  Bladder: 0  Bowels: 0  Groomin  Dressin  Feedin  Mobility: 0  Stairs: 0  Toilet Use: 5  Transfer (Bed to Chair and Back): 10  Total: 25/100       The Barthel ADL Index: Guidelines  1. The index should be used as a record of what a patient does, not as a record of what a patient could do. 2. The main aim is to establish degree of independence from any help, physical or verbal, however minor and for whatever reason. 3. The need for supervision renders the patient not independent. 4. A patient's performance should be established using the best available evidence. Asking the patient, friends/relatives and nurses are the usual sources, but direct observation and common sense are also important. However direct testing is not needed. 5. Usually the patient's performance over the preceding 24-48 hours is important, but occasionally longer periods will be relevant. 6. Middle categories imply that the patient supplies over 50 per cent of the effort. 7. Use of aids to be independent is allowed. Score Interpretation (from [address] [address])    Independent   60-79 Minimally independent   40-59 Partially dependent   20-39 Very dependent   <20 Totally dependent     -Savana Dudley., Barthel, D.W. (1965). Functional evaluation: the Barthel Index. [address] ([address] (1997). The Barthel activities of daily living index: self-reporting versus actual performance in the old (> or = 75 years).  Journal of [address] [place] [address] 45(7), 14 [facility], REBECA GUZMAN. Hazel Terry., Que Díaz., Yarely Guerrero (1999). Measuring the change in disability after inpatient rehabilitation; comparison of the responsiveness of the Barthel Index and Functional [place] Measure. Journal of Neurology, Neurosurgery, and Psychiatry, 66(4), 914-697. LAUREL Morelos, GEOVANNA Mora, & Mleva Vivar M.A. (2004) Assessment of post-stroke quality of life in cost-effectiveness studies: The usefulness of the Barthel Index and the EuroQoL-5D. Quality of Life Research, 15, 179-65        Activity Tolerance:   Good    After treatment patient left in no apparent distress:   Sitting in chair, Heels elevated for pressure relief, Call bell within reach, Bed / chair alarm activated, and Side rails x 3    COMMUNICATION/EDUCATION:   The patients plan of care was discussed with: Occupational therapist and Registered nurse. Fall prevention education was provided and the patient/caregiver indicated understanding. Thank you for this referral.  Radha Gordon, PT,WCC.    Time Calculation: 24 mins

## 2022-01-21 NOTE — PROGRESS NOTES
8358  Bedside and Verbal shift change report given to Madeleine Huynh RN (oncoming nurse) by Raciel Busch RN (offgoing nurse). Report included the following information SBAR, Kardex, Intake/Output, MAR, Accordion, Recent Results and Med Rec Status.

## 2022-01-21 NOTE — PROGRESS NOTES
Problem: Self Care Deficits Care Plan (Adult)  Goal: *Therapy Goal (Edit Goal, Insert Text)  Description: FUNCTIONAL STATUS PRIOR TO ADMISSION: Patient has history of dementia and lives with spouse. She reports until recently patient was completing basic ADL tasks with independence, though he was not showering. He has a cane though does not typically use it for ambulation      HOME SUPPORT: The patient lived with spouse. Occupational Therapy Goals  Weekly re-assessment 1/21- goals remain appropriate; Initiated 1/14/2022  1. Patient will perform grooming at sink with supervision/set-up within 7 day(s). 2.  Patient will perform upper body dressing with supervision/set-up within 7 day(s). 3.  Patient will perform lower body dressing with minimal assistance/contact guard assist within 7 day(s). 4.  Patient will perform toilet transfers with supervision/set-up within 7 day(s). 5.  Patient will perform all aspects of toileting with minimal assistance/contact guard assist within 7 day(s). Outcome: Progressing Towards Goal   OCCUPATIONAL THERAPY TREATMENT/WEEKLY RE-EVALUATION  Patient: Uzma Calix (45 y.o. male)  Date: 1/21/2022  Diagnosis: AMS (altered mental status) [R41.82] AMS (altered mental status)       Precautions: Fall  Chart, occupational therapy assessment, plan of care, and goals were reviewed. ASSESSMENT  Based on the objective data described below he is below baseline with ADL tasks. He is making fluctuating progression with OT services secondary to hx of dementia limiting his carry over, command following and safety awareness. Nursing cleared for therapy. Received sleeping in bed. Agreeable for therapy with additional time to wake up. Supine > sit with mod AX2 with additional time for processing. Good static sitting balance at EOB to participate in dressing tasks. Sit > stand with mod AX2 with forward flexed posture, reaching out for support from environment.   He ambulated 3-4 ft with mod Ax2 with HHA to chair. Left up in chair in doorway. Discussed with nursing. Current Level of Function Impacting Discharge (ADLs): gown min A, socks max A, self care transfer mod Ax2    Other factors to consider for discharge: Patient with hx of dementia. He is below baseline and would benefit from additional therapy services at [address] [place] [address]. If home, he will need 24/7 assistance as he has poor safety awareness. PLAN :  Goals have been updated based on progression since last assessment. Patient continues to benefit from skilled intervention to address the above impairments. Continue to follow patient 3 times a week to address goals. Recommend with staff: up to chair for meals and BSC for toileting as appropriate with x2 assist     Recommend next OT session: per POC    Recommendation for discharge: (in order for the patient to meet his/her long term goals)  Therapy up to 5 days/week in SNF setting    This discharge recommendation:  Has been made in collaboration with the attending provider and/or case management    Equipment recommendations for successful discharge (if) home: RW? Potentially poor carry over       SUBJECTIVE:   Patient stated \"I was a .     OBJECTIVE DATA SUMMARY:   Cognitive/Behavioral Status:  Neurologic State: Alert  Orientation Level: Disoriented to place; Disoriented to situation;Disoriented to time;Oriented to person  Cognition: Decreased attention/concentration;Poor safety awareness; Impaired decision making    Functional Mobility and Transfers for ADLs:  Bed Mobility:  Rolling: Moderate assistance  Supine to Sit: Moderate assistance  Sit to Supine: Moderate assistance  Scooting: Moderate assistance    Transfers:  Sit to Stand: Moderate assistance;Assist x2     Bed to Chair: Moderate assistance;Assist x2    Balance:  Sitting: Impaired;High guard  Sitting - Static: Fair (occasional)  Sitting - Dynamic: Fair (occasional)  Standing: Impaired; With support  Standing - Static: Constant support; Fair  Standing - Dynamic : Constant support; Fair    ADL Intervention:       Upper Body [address]: Minimum  assistance    Lower Body Dressing Assistance  Socks: Maximum assistance      Pain:  Denies pain    Activity Tolerance:   Fair    After treatment patient left in no apparent distress:   Sitting in chair, Call bell within reach, and saroj chair    COMMUNICATION/COLLABORATION:   The patients plan of care was discussed with: Physical Therapist and Registered Nurse    Brandi [place]ila OT  Time Calculation: 24 mins

## 2022-01-21 NOTE — PALLIATIVE CARE DISCHARGE
The Palliative Medicine team was consulted as part of your / your loved one's care in the hospital. Our team is a supportive service; we strive to relieve suffering and improve quality of life. We reviewed advance care planning information, which includes the following:  Advance Care Planning 1/19/2022   Patient's Healthcare Decision Maker is: Legal Next of Raji Blanca   Primary Decision Maker Name Mahogany Bautista   Primary Decision Maker Phone Number [phone]   Primary Decision Maker Relationship to Patient Wife   Confirm Advance Directive None   Patient Would Like to Complete Advance Directive Unable   Does the patient have other document types Do Not Resuscitate;MOST/MOLST/POST/POLST     We reviewed / discussed your code status as: DNR     Full Code means perform CPR in the event of cardiac arrest     [facility] means do NOT perform CPR in the event of cardiac arrest     Partial Code means you have specific preferences, please discuss with your health care team     No Order means this issue was not addressed / resolved during your stay    You have a Durable Do Not Resuscitate Order in place, which should travel with you. When you are in a facility, this form should be placed on your chart. Once you are home, it is recommended that the [facility] form be placed in a visible location such as on the refrigerator or bedroom door. Because of the importance of this information, we are providing you with a printed copy to share with other healthcare providers after this hospitalization is complete. If any of the above information is incomplete or incorrect, please contact the Palliative Medicine team at [phone].

## 2022-01-21 NOTE — PROGRESS NOTES
1/21/2022  Case Management Progress Note    12:16 PM  Patient is 68year old male admitted 1/12 with altered mental status  Patient's RUR is 9% green/low risk for readmission  Covid test: negative 1/17  Chart reviewed--patient discussed at IDR rounds  Per Aurelio Alston , [facility] SNF (sister facility to [facility]) is interested in patient, however they were wanting to know the discharge plan after SNF. I called and spoke with patient's daughter Araceli Harvey, and she confirms that after SNF their plan is to get things in place for him to come home on hospice. They are not decided what hospice service yet, but that is their plan. Reassured Conchita that it does seem like the seroquel is helping patient (per MD this morning) and that he reacted very well to music therapy. Will continue to follow and update. Transition of Care Plan   1. Continue medical management/treatment  2. [facility] interested in patient  3. Plan after that is home with hospice  4. Will need ambulance transport   5.  CM will continue to follow    ROD Lofton

## 2022-01-21 NOTE — PROGRESS NOTES
Bedside shift change report given to Celena Chan  (oncoming nurse) by Enriqueta Faye  (offgoing nurse). Report included the following information SBAR, Kardex, MAR, Recent Results and Med Rec Status.

## 2022-01-21 NOTE — PROGRESS NOTES
1/21/2022  Transition of Care Plan to SNF/Rehab    SNF/Rehab Transition:  Patient has been accepted to [facility] and meets criteria for admission. Patient will transported by Hospital to [address]* and expected to leave at 3:30pm.    Communication to Patient/Family:  Met with patient and wife, daughter (identified care giver) and they are agreeable to the transition plan. Communication to SNF/Rehab:  Bedside RN, Neal Palomo, has been notified to update the transition plan to the facility and call report (phone number [phone]). Patient is going to room 30A. Nursing Please include all hard scripts for controlled substances, med rec and dc summary, and AVS in packet. Reviewed and confirmed with facility, [facility], can manage the patient care needs for the following:     SNF/Rehab Transition:  Patient to follow-up with Home Health: none at this time  PCP/Specialist: Patient will follow up with MD at facility     Reviewed and confirmed with facility, [facility]* they can manage the patient care needs for the following:     [employer] with (X) only those applicable:    Medication:  [x]  Medications will be available at the facility  []  IV Antibiotics   []  Controlled Substance - hard copy to be sent with patient   []  Weekly Labs   Documents:  [] Hard RX  [] MAR  [] Kardex  [x] AVS  []Transfer Summary  []Discharge   Equipment:  []  CPAP/BiPAP  []  Wound Vacuum  []  Brumfield or Urinary Device  []  PICC/Central Line  []  Nebulizer  []  Ventilator   Treatment:  []Isolation (for MRSA, VRE, etc.)  []Surgical Drain Management  []Tracheostomy Care  []Dressing Changes  []Dialysis with transportation and chair time. []PEG Care  []Oxygen  []Daily Weights for Heart Failure   Dietary:  []Any diet limitations  []Tube Feedings   []Total Parenteral Management (TPN)   Eligible for Medicaid Long Term Services and Supports  Yes:  [] Eligible for medical assistance or will become eligible within 180 days and UAI completed. [] Provider/Patient and/or support system has requested screening. [x] UAI copy provided to patient or responsible party. [] UAI unavailable at discharge will send once processed to SNF provider. [] UAI unavailable at discharged mailed to patient  No:   [] Private pay and is not financially eligible for Medicaid within the next 180 days. [] Reside out-of-state.   [] A residents of a state owned/operated facility that is licensed  by [address] [employer] [facility] or [facility]  [] Enrollment in [facility] hospice services  [] [address] [place] [address] [facility]  [] Patient /Family declines to have screening completed or provide financial information for screening     Financial Resources:  Medicaid    [] Initiated and application pending   [] Full coverage     Advanced Care Plan:  []Surrogate Decision Maker of Care  []POA  [x]Communicated Code Status DNR    Other     Financial Resources:  Medicaid    [] Initiated and application pending   [] Full coverage     Advanced Care Plan:  []Surrogate Decision Maker of Care  []POA  [x]Communicated Code Status DNR    Other     ROD Tamayo

## 2022-01-21 NOTE — DISCHARGE INSTRUCTIONS
Patient Discharge Instructions    Seth Eduardo / [id number] : 1945    Admitted 2022 Discharged: 2022 6:14 AM     Primary care provider: Dana Palmer MD    Discharging provider:  Saray Pastrana MD  - Family Medicine Resident  Emmanuel Ruth MD, MD - Family Medicine, Attending    . . . . . . . . . . . . . . . . . . . . . . . . . . . . . . . . . . . . . . . . . . . . . . . . . . . . . . . . . . . . . . . . . . . . . . . . MEDICATION CHANGES  START:   - Seroquel 25mg twice a day (once in AM, once in PM)  - Nicotine patch daily     STOP: None     CHANGES: None    No other changes were made to your medications, please take all your other home medicines as previously prescribed. . . . . . . . . . . . . . . . . . . . . . . . . . . . . . . . . . . . . . . . . . . . . . . . . . . . . . . . . . . . . . . . . . . . . . . Shahriar Major FINAL DIAGNOSES & HOSPITAL COURSE:    Alzheimer's dementia w/ behavioral disturbance: Acutely worsened from baseline. MRI w/ central atrophy. Neurology recommended initiation of antipsychotic. Per review, not taking Aricept 10mg outpatient. Currently not requiring restraints, patient easily redirectable. - Family expressing wishes to pursue SNF until further care decisions can be made  - CM sending SNF referrals  - Seroquel 25mg BID   - Further management per accepting facility     DM2: A1c 6.7. Per chart review, Metformin 1000mg qhs outpatient. BG remain elevated during admission, so started on lantus while inpatient.  - Resume home metformin  - Further management per accepting facility      Hypertension: Chronic, stable. Not on meds per wife but losartan-HCTZ listed as home med. BP have been stable w/o intervention throughout hospital stay. - Further management per accepting facility      COPD: Chronic, stable. Per wife, using Symbicort and ProAir PRN, with increased use recently.    - Symbicort neb BID PRN  - Albuterol neb PRN   - Further management per accepting facility      HLD:  TChol 206, .8 (1/13/22). Not taking home Zocor 20mg. Unable to calculate ASCVD risk given age. - Further management per accepting facility     MDD/DENISE: Not on meds, per pt wife. On Zoloft in past, however pt's wife said he d/c'ed use after not noticing much of a difference. - Further management per accepting facility     Nicotine dependence: Lifelong tobacco dip user. - Daily nicotine patch 21mg/24hr  - Further management per accepting facility      Elevated troponin (stable): 10 >14. EKG NSR with occassional PVCs, no ST segment changes.      UA positive for blood: Initially concerned for rhabdomyolysis, CK only mildly elevated at 415. S/p IVF on admission.   - Consider uro follow up as outpatient if hematuria persists on 6wk f/u UA  - Further management per accepting facility      Elevated bilirubin: Unclear etiology. In past, has had elevated TB up to 2.8. Pt with h/o cholecystectomy. Indirect bili 1.5, direct 0.3. No appearance of jaundice.  - Consider outpt workup  - Further management per accepting facility     FOLLOW-UP CARE RECOMMENDATIONS:    APPOINTMENTS:  Follow-up Information     Follow up With Specialties Details Why Contact Info    Cipriano Jamil [address]  [phone]              It is very important that you keep follow-up appointment(s). Bring discharge papers, medication list (and/or medication bottles) to follow-up appointments for review by outpatient provider(s). FOLLOW-UP TESTS RECOMMENDED:   · Consider UA and further hyperbilirubinemia workup outpatient    ONGOING TREATMENT PLAN: As noted above. PENDING TEST RESULTS:  At the time of discharge the following test results are still pending: None. Please review these results as they become available.     Specific symptoms to watch for: chest pain, shortness of breath, fever, chills, nausea, vomiting, diarrhea, change in mentation, falling, weakness, bleeding. DIET:  Diabetic diet    ACTIVITY:  Activity as tolerated    WOUND CARE: None needed    EQUIPMENT needed:  None    INCIDENTAL FINDINGS:  None     GOALS OF CARE:       [] Eventual return to home/independent/assisted living       [x] Long term SNF       [] Hospice    Patient condition at discharge:   Functional status       [] Poor       [x] Deconditioned       [] Independent  Cognition       [] Lucid       [] Forgetful (Some senescence)       [x] Dementia  Catheters/lines (plus indication)       [] Brumfield       [] PICC           [] PEG       [x] None  Code status       [] Full code       [x] DNR  . . . . . . . . . . . . . . . . . . . . . . . . . . . . . . . . . . . . . . . . . . . . . . . . . . . . . . . . . . . . . . . . . . . . . . . Carroll Beyer      CHRONIC MEDICAL CONDITIONS:  Problem List as of 1/16/2022 Date Reviewed: 10/27/2021          Codes Class Noted - Resolved    * (Principal) AMS (altered mental status) ICD-10-CM: R41.82  ICD-9-CM: 780.97  1/12/2022 - Present        High cholesterol ICD-10-CM: E78.00  ICD-9-CM: 272.0  10/27/2021 - Present        Type 2 diabetes with nephropathy ([facility] ICD-10-CM: E11.21  ICD-9-CM: 250.40, 583.81  10/26/2018 - Present        Type 2 diabetes mellitus with hyperglycemia, without long-term current use of insulin (HCC) (Chronic) ICD-10-CM: E11.65  ICD-9-CM: 250.00, 790.29  7/5/2017 - Present        Osteoarthritis ICD-10-CM: M19.90  ICD-9-CM: 715.90  3/6/2017 - Present        Advance care planning ICD-10-CM: Z71.89  ICD-9-CM: V65.49  12/5/2016 - Present        Dementia without behavioral disturbance ([facility] ICD-10-CM: F03.90  ICD-9-CM: 294.20  4/6/2016 - Present        PUD (peptic ulcer disease) (Chronic) ICD-10-CM: K27.9  ICD-9-CM: 533.90  3/30/2016 - Present        Iron deficiency anemia (Chronic) ICD-10-CM: D50.9  ICD-9-CM: 280.9  3/30/2016 - Present        Candidal esophagitis (HCC) ICD-10-CM: B37.81  ICD-9-CM: 112.84  3/30/2016 - Present        COPD (chronic obstructive pulmonary disease) ([facility] (Chronic) ICD-10-CM: J44.9  ICD-9-CM: 655  3/30/2016 - Present        HTN (hypertension) (Chronic) ICD-10-CM: I10  ICD-9-CM: 401.9  3/30/2016 - Present        Blood loss anemia ICD-10-CM: D50.0  ICD-9-CM: 280.0  3/28/2016 - Present        GI bleed ICD-10-CM: K92.2  ICD-9-CM: 578.9  3/28/2016 - Present        RESOLVED: Type II diabetes mellitus ([facility] (Chronic) ICD-10-CM: E11.9  ICD-9-CM: 250.00  3/30/2016 - 7/5/2017              Information obtained by :   I understand that if any problems occur once I am at home I am to contact my physician. I understand and acknowledge receipt of the instructions indicated above.                                                                                                                                              Physician's or R.N.'s Signature                                                                  Date/Time                                                                                                                                              Patient or Representative Signature                                                          Date/Time

## 2022-01-24 NOTE — PROGRESS NOTES
Transition of care outreach postponed for 14 days due to patient's discharge to SNF.   D/C to [facility] 1/12/22 f/u 14d

## 2022-02-08 NOTE — PROGRESS NOTES
Transition of care outreach postponed for 14 days due to patient's discharge to SNF.   D/C to [facility] 1/21/22 still admitted f/u 14d

## 2022-02-22 ENCOUNTER — PATIENT OUTREACH (OUTPATIENT)
Dept: CASE MANAGEMENT | Age: 77
End: 2022-02-22
